# Patient Record
Sex: FEMALE | Race: WHITE | Employment: OTHER | URBAN - METROPOLITAN AREA
[De-identification: names, ages, dates, MRNs, and addresses within clinical notes are randomized per-mention and may not be internally consistent; named-entity substitution may affect disease eponyms.]

---

## 2017-03-08 ENCOUNTER — OFFICE VISIT (OUTPATIENT)
Dept: PRIMARY CARE CLINIC | Age: 67
End: 2017-03-08

## 2017-03-08 VITALS
RESPIRATION RATE: 16 BRPM | BODY MASS INDEX: 35.26 KG/M2 | WEIGHT: 191.6 LBS | SYSTOLIC BLOOD PRESSURE: 124 MMHG | TEMPERATURE: 89.3 F | HEIGHT: 62 IN | HEART RATE: 78 BPM | DIASTOLIC BLOOD PRESSURE: 80 MMHG

## 2017-03-08 DIAGNOSIS — J06.9 ACUTE URI: Primary | ICD-10-CM

## 2017-03-08 DIAGNOSIS — B02.9 HERPES ZOSTER WITHOUT COMPLICATION: ICD-10-CM

## 2017-03-08 DIAGNOSIS — S60.141A CONTUSION OF RIGHT RING FINGER WITH DAMAGE TO NAIL, INITIAL ENCOUNTER: ICD-10-CM

## 2017-03-08 DIAGNOSIS — J30.1 NON-SEASONAL ALLERGIC RHINITIS DUE TO POLLEN: ICD-10-CM

## 2017-03-08 DIAGNOSIS — M81.0 OSTEOPOROSIS: ICD-10-CM

## 2017-03-08 PROCEDURE — 1036F TOBACCO NON-USER: CPT | Performed by: INTERNAL MEDICINE

## 2017-03-08 PROCEDURE — 99214 OFFICE O/P EST MOD 30 MIN: CPT | Performed by: INTERNAL MEDICINE

## 2017-03-08 PROCEDURE — 1090F PRES/ABSN URINE INCON ASSESS: CPT | Performed by: INTERNAL MEDICINE

## 2017-03-08 PROCEDURE — G8484 FLU IMMUNIZE NO ADMIN: HCPCS | Performed by: INTERNAL MEDICINE

## 2017-03-08 PROCEDURE — G8417 CALC BMI ABV UP PARAM F/U: HCPCS | Performed by: INTERNAL MEDICINE

## 2017-03-08 PROCEDURE — G8400 PT W/DXA NO RESULTS DOC: HCPCS | Performed by: INTERNAL MEDICINE

## 2017-03-08 PROCEDURE — 4040F PNEUMOC VAC/ADMIN/RCVD: CPT | Performed by: INTERNAL MEDICINE

## 2017-03-08 PROCEDURE — 3014F SCREEN MAMMO DOC REV: CPT | Performed by: INTERNAL MEDICINE

## 2017-03-08 PROCEDURE — 4005F PHARM THX FOR OP RXD: CPT | Performed by: INTERNAL MEDICINE

## 2017-03-08 PROCEDURE — 1123F ACP DISCUSS/DSCN MKR DOCD: CPT | Performed by: INTERNAL MEDICINE

## 2017-03-08 PROCEDURE — 3017F COLORECTAL CA SCREEN DOC REV: CPT | Performed by: INTERNAL MEDICINE

## 2017-03-08 PROCEDURE — G8427 DOCREV CUR MEDS BY ELIG CLIN: HCPCS | Performed by: INTERNAL MEDICINE

## 2017-03-08 RX ORDER — CEFUROXIME AXETIL 500 MG/1
500 TABLET ORAL 2 TIMES DAILY
Qty: 20 TABLET | Refills: 0 | Status: SHIPPED | OUTPATIENT
Start: 2017-03-08 | End: 2017-03-18

## 2017-03-13 RX ORDER — VALACYCLOVIR HYDROCHLORIDE 1 G/1
1000 TABLET, FILM COATED ORAL 3 TIMES DAILY
Qty: 21 TABLET | Refills: 0 | Status: SHIPPED | OUTPATIENT
Start: 2017-03-13 | End: 2018-04-17 | Stop reason: ALTCHOICE

## 2017-03-19 ASSESSMENT — ENCOUNTER SYMPTOMS
CHOKING: 0
SINUS PAIN: 1
BLOOD IN STOOL: 0
ABDOMINAL DISTENTION: 0
RHINORRHEA: 1
APNEA: 0
FACIAL SWELLING: 0
PHOTOPHOBIA: 0

## 2017-04-17 ENCOUNTER — HOSPITAL ENCOUNTER (OUTPATIENT)
Dept: WOMENS IMAGING | Age: 67
Discharge: HOME | End: 2017-04-17
Payer: MEDICARE

## 2017-04-17 DIAGNOSIS — M81.0 OSTEOPOROSIS: ICD-10-CM

## 2017-04-17 PROCEDURE — 77080 DXA BONE DENSITY AXIAL: CPT

## 2017-04-18 ENCOUNTER — OFFICE VISIT (OUTPATIENT)
Dept: PRIMARY CARE CLINIC | Age: 67
End: 2017-04-18

## 2017-04-18 VITALS
HEIGHT: 62 IN | DIASTOLIC BLOOD PRESSURE: 80 MMHG | WEIGHT: 192.1 LBS | RESPIRATION RATE: 14 BRPM | SYSTOLIC BLOOD PRESSURE: 120 MMHG | BODY MASS INDEX: 35.35 KG/M2 | HEART RATE: 76 BPM | TEMPERATURE: 98.2 F

## 2017-04-18 DIAGNOSIS — M25.561 ACUTE PAIN OF RIGHT KNEE: Primary | ICD-10-CM

## 2017-04-18 DIAGNOSIS — Z23 NEED FOR VACCINATION WITH 13-POLYVALENT PNEUMOCOCCAL CONJUGATE VACCINE: ICD-10-CM

## 2017-04-18 PROCEDURE — 4040F PNEUMOC VAC/ADMIN/RCVD: CPT | Performed by: INTERNAL MEDICINE

## 2017-04-18 PROCEDURE — G8417 CALC BMI ABV UP PARAM F/U: HCPCS | Performed by: INTERNAL MEDICINE

## 2017-04-18 PROCEDURE — 3014F SCREEN MAMMO DOC REV: CPT | Performed by: INTERNAL MEDICINE

## 2017-04-18 PROCEDURE — 1090F PRES/ABSN URINE INCON ASSESS: CPT | Performed by: INTERNAL MEDICINE

## 2017-04-18 PROCEDURE — 3017F COLORECTAL CA SCREEN DOC REV: CPT | Performed by: INTERNAL MEDICINE

## 2017-04-18 PROCEDURE — 1036F TOBACCO NON-USER: CPT | Performed by: INTERNAL MEDICINE

## 2017-04-18 PROCEDURE — 90670 PCV13 VACCINE IM: CPT | Performed by: INTERNAL MEDICINE

## 2017-04-18 PROCEDURE — G8399 PT W/DXA RESULTS DOCUMENT: HCPCS | Performed by: INTERNAL MEDICINE

## 2017-04-18 PROCEDURE — 1123F ACP DISCUSS/DSCN MKR DOCD: CPT | Performed by: INTERNAL MEDICINE

## 2017-04-18 PROCEDURE — G0009 ADMIN PNEUMOCOCCAL VACCINE: HCPCS | Performed by: INTERNAL MEDICINE

## 2017-04-18 PROCEDURE — 99214 OFFICE O/P EST MOD 30 MIN: CPT | Performed by: INTERNAL MEDICINE

## 2017-04-18 PROCEDURE — G8427 DOCREV CUR MEDS BY ELIG CLIN: HCPCS | Performed by: INTERNAL MEDICINE

## 2017-04-18 RX ORDER — HYDROCODONE BITARTRATE AND ACETAMINOPHEN 5; 325 MG/1; MG/1
1 TABLET ORAL EVERY 6 HOURS PRN
Qty: 28 TABLET | Refills: 0 | Status: SHIPPED | OUTPATIENT
Start: 2017-04-18 | End: 2017-11-03 | Stop reason: SDUPTHER

## 2017-04-18 ASSESSMENT — PATIENT HEALTH QUESTIONNAIRE - PHQ9
1. LITTLE INTEREST OR PLEASURE IN DOING THINGS: 0
SUM OF ALL RESPONSES TO PHQ9 QUESTIONS 1 & 2: 0
SUM OF ALL RESPONSES TO PHQ QUESTIONS 1-9: 0
2. FEELING DOWN, DEPRESSED OR HOPELESS: 0

## 2017-04-30 ASSESSMENT — ENCOUNTER SYMPTOMS
BLOOD IN STOOL: 0
CHOKING: 0
ABDOMINAL DISTENTION: 0
PHOTOPHOBIA: 0
SINUS PAIN: 1
APNEA: 0
RHINORRHEA: 1
FACIAL SWELLING: 0

## 2017-08-07 ENCOUNTER — OFFICE VISIT (OUTPATIENT)
Dept: PRIMARY CARE CLINIC | Age: 67
End: 2017-08-07

## 2017-08-07 VITALS
HEIGHT: 62 IN | OXYGEN SATURATION: 96 % | WEIGHT: 192.3 LBS | BODY MASS INDEX: 35.39 KG/M2 | TEMPERATURE: 97.7 F | HEART RATE: 70 BPM | RESPIRATION RATE: 16 BRPM | DIASTOLIC BLOOD PRESSURE: 70 MMHG | SYSTOLIC BLOOD PRESSURE: 128 MMHG

## 2017-08-07 DIAGNOSIS — L03.115 CELLULITIS OF FOOT, RIGHT: Primary | ICD-10-CM

## 2017-08-07 PROCEDURE — 4040F PNEUMOC VAC/ADMIN/RCVD: CPT | Performed by: INTERNAL MEDICINE

## 2017-08-07 PROCEDURE — G8427 DOCREV CUR MEDS BY ELIG CLIN: HCPCS | Performed by: INTERNAL MEDICINE

## 2017-08-07 PROCEDURE — 1123F ACP DISCUSS/DSCN MKR DOCD: CPT | Performed by: INTERNAL MEDICINE

## 2017-08-07 PROCEDURE — 3017F COLORECTAL CA SCREEN DOC REV: CPT | Performed by: INTERNAL MEDICINE

## 2017-08-07 PROCEDURE — 99214 OFFICE O/P EST MOD 30 MIN: CPT | Performed by: INTERNAL MEDICINE

## 2017-08-07 PROCEDURE — G8399 PT W/DXA RESULTS DOCUMENT: HCPCS | Performed by: INTERNAL MEDICINE

## 2017-08-07 PROCEDURE — 96372 THER/PROPH/DIAG INJ SC/IM: CPT | Performed by: INTERNAL MEDICINE

## 2017-08-07 PROCEDURE — G8417 CALC BMI ABV UP PARAM F/U: HCPCS | Performed by: INTERNAL MEDICINE

## 2017-08-07 PROCEDURE — 3014F SCREEN MAMMO DOC REV: CPT | Performed by: INTERNAL MEDICINE

## 2017-08-07 PROCEDURE — 1036F TOBACCO NON-USER: CPT | Performed by: INTERNAL MEDICINE

## 2017-08-07 PROCEDURE — 1090F PRES/ABSN URINE INCON ASSESS: CPT | Performed by: INTERNAL MEDICINE

## 2017-08-07 RX ORDER — LEVOFLOXACIN 500 MG/1
500 TABLET, FILM COATED ORAL DAILY
Qty: 10 TABLET | Refills: 0 | Status: SHIPPED | OUTPATIENT
Start: 2017-08-07 | End: 2017-08-17

## 2017-08-07 RX ORDER — METHYLPREDNISOLONE ACETATE 80 MG/ML
80 INJECTION, SUSPENSION INTRA-ARTICULAR; INTRALESIONAL; INTRAMUSCULAR; SOFT TISSUE ONCE
Status: COMPLETED | OUTPATIENT
Start: 2017-08-07 | End: 2017-08-07

## 2017-08-07 RX ORDER — VALACYCLOVIR HYDROCHLORIDE 1 G/1
2000 TABLET, FILM COATED ORAL 2 TIMES DAILY
Qty: 21 TABLET | Refills: 0 | Status: ON HOLD | OUTPATIENT
Start: 2017-08-07 | End: 2018-05-09

## 2017-08-07 RX ADMIN — METHYLPREDNISOLONE ACETATE 80 MG: 80 INJECTION, SUSPENSION INTRA-ARTICULAR; INTRALESIONAL; INTRAMUSCULAR; SOFT TISSUE at 18:54

## 2017-08-10 ASSESSMENT — ENCOUNTER SYMPTOMS
FACIAL SWELLING: 0
BLOOD IN STOOL: 0
ABDOMINAL DISTENTION: 0
CHOKING: 0
PHOTOPHOBIA: 0
APNEA: 0

## 2017-08-21 DIAGNOSIS — E78.1 HYPERTRIGLYCERIDEMIA: ICD-10-CM

## 2017-08-22 RX ORDER — GEMFIBROZIL 600 MG/1
TABLET, FILM COATED ORAL
Qty: 60 TABLET | Refills: 5 | Status: ON HOLD | OUTPATIENT
Start: 2017-08-22 | End: 2018-05-09

## 2017-11-03 ENCOUNTER — OFFICE VISIT (OUTPATIENT)
Dept: PRIMARY CARE CLINIC | Age: 67
End: 2017-11-03

## 2017-11-03 VITALS
DIASTOLIC BLOOD PRESSURE: 80 MMHG | HEIGHT: 62 IN | HEART RATE: 77 BPM | TEMPERATURE: 98 F | BODY MASS INDEX: 36.34 KG/M2 | RESPIRATION RATE: 18 BRPM | WEIGHT: 197.5 LBS | OXYGEN SATURATION: 96 % | SYSTOLIC BLOOD PRESSURE: 132 MMHG

## 2017-11-03 DIAGNOSIS — E78.00 PURE HYPERCHOLESTEROLEMIA: ICD-10-CM

## 2017-11-03 DIAGNOSIS — R60.0 EDEMA, LEG: ICD-10-CM

## 2017-11-03 DIAGNOSIS — M79.605 LOW BACK PAIN RADIATING TO BOTH LEGS: ICD-10-CM

## 2017-11-03 DIAGNOSIS — R73.9 HYPERGLYCEMIA: ICD-10-CM

## 2017-11-03 DIAGNOSIS — M25.561 ACUTE PAIN OF RIGHT KNEE: Primary | ICD-10-CM

## 2017-11-03 DIAGNOSIS — E03.8 OTHER SPECIFIED HYPOTHYROIDISM: ICD-10-CM

## 2017-11-03 DIAGNOSIS — M25.561 ACUTE PAIN OF RIGHT KNEE: ICD-10-CM

## 2017-11-03 DIAGNOSIS — M54.50 LOW BACK PAIN RADIATING TO BOTH LEGS: ICD-10-CM

## 2017-11-03 DIAGNOSIS — G47.09 OTHER INSOMNIA: ICD-10-CM

## 2017-11-03 DIAGNOSIS — M79.604 LOW BACK PAIN RADIATING TO BOTH LEGS: ICD-10-CM

## 2017-11-03 DIAGNOSIS — Z23 NEED FOR INFLUENZA VACCINATION: ICD-10-CM

## 2017-11-03 LAB
ALBUMIN SERPL-MCNC: 4.6 G/DL (ref 3.9–4.9)
ALP BLD-CCNC: 94 U/L (ref 40–130)
ALT SERPL-CCNC: 24 U/L (ref 0–33)
ANION GAP SERPL CALCULATED.3IONS-SCNC: 14 MEQ/L (ref 7–13)
AST SERPL-CCNC: 19 U/L (ref 0–35)
BASOPHILS ABSOLUTE: 0 K/UL (ref 0–0.2)
BASOPHILS RELATIVE PERCENT: 0.4 %
BILIRUB SERPL-MCNC: 0.2 MG/DL (ref 0–1.2)
BUN BLDV-MCNC: 21 MG/DL (ref 8–23)
CALCIUM SERPL-MCNC: 9.3 MG/DL (ref 8.6–10.2)
CHLORIDE BLD-SCNC: 102 MEQ/L (ref 98–107)
CHOLESTEROL, TOTAL: 202 MG/DL (ref 0–199)
CO2: 29 MEQ/L (ref 22–29)
CREAT SERPL-MCNC: 0.58 MG/DL (ref 0.5–0.9)
EOSINOPHILS ABSOLUTE: 0.1 K/UL (ref 0–0.7)
EOSINOPHILS RELATIVE PERCENT: 1.6 %
GFR AFRICAN AMERICAN: >60
GFR NON-AFRICAN AMERICAN: >60
GLOBULIN: 2.6 G/DL (ref 2.3–3.5)
GLUCOSE BLD-MCNC: 126 MG/DL (ref 74–109)
HBA1C MFR BLD: 6.1 % (ref 4.8–5.9)
HCT VFR BLD CALC: 40.5 % (ref 37–47)
HDLC SERPL-MCNC: 45 MG/DL (ref 40–59)
HEMOGLOBIN: 13.2 G/DL (ref 12–16)
LDL CHOLESTEROL CALCULATED: 131 MG/DL (ref 0–129)
LYMPHOCYTES ABSOLUTE: 2.7 K/UL (ref 1–4.8)
LYMPHOCYTES RELATIVE PERCENT: 38.1 %
MCH RBC QN AUTO: 27.6 PG (ref 27–31.3)
MCHC RBC AUTO-ENTMCNC: 32.6 % (ref 33–37)
MCV RBC AUTO: 84.9 FL (ref 82–100)
MONOCYTES ABSOLUTE: 0.5 K/UL (ref 0.2–0.8)
MONOCYTES RELATIVE PERCENT: 7.6 %
NEUTROPHILS ABSOLUTE: 3.7 K/UL (ref 1.4–6.5)
NEUTROPHILS RELATIVE PERCENT: 52.3 %
PDW BLD-RTO: 13.8 % (ref 11.5–14.5)
PLATELET # BLD: 392 K/UL (ref 130–400)
POTASSIUM SERPL-SCNC: 4.8 MEQ/L (ref 3.5–5.1)
RBC # BLD: 4.78 M/UL (ref 4.2–5.4)
SODIUM BLD-SCNC: 145 MEQ/L (ref 132–144)
TOTAL PROTEIN: 7.2 G/DL (ref 6.4–8.1)
TRIGL SERPL-MCNC: 129 MG/DL (ref 0–200)
TSH SERPL DL<=0.05 MIU/L-ACNC: 2.1 UIU/ML (ref 0.27–4.2)
WBC # BLD: 7.1 K/UL (ref 4.8–10.8)

## 2017-11-03 PROCEDURE — 1090F PRES/ABSN URINE INCON ASSESS: CPT | Performed by: INTERNAL MEDICINE

## 2017-11-03 PROCEDURE — 3017F COLORECTAL CA SCREEN DOC REV: CPT | Performed by: INTERNAL MEDICINE

## 2017-11-03 PROCEDURE — G0008 ADMIN INFLUENZA VIRUS VAC: HCPCS | Performed by: INTERNAL MEDICINE

## 2017-11-03 PROCEDURE — 1036F TOBACCO NON-USER: CPT | Performed by: INTERNAL MEDICINE

## 2017-11-03 PROCEDURE — G8399 PT W/DXA RESULTS DOCUMENT: HCPCS | Performed by: INTERNAL MEDICINE

## 2017-11-03 PROCEDURE — 99214 OFFICE O/P EST MOD 30 MIN: CPT | Performed by: INTERNAL MEDICINE

## 2017-11-03 PROCEDURE — G8417 CALC BMI ABV UP PARAM F/U: HCPCS | Performed by: INTERNAL MEDICINE

## 2017-11-03 PROCEDURE — G8484 FLU IMMUNIZE NO ADMIN: HCPCS | Performed by: INTERNAL MEDICINE

## 2017-11-03 PROCEDURE — G8427 DOCREV CUR MEDS BY ELIG CLIN: HCPCS | Performed by: INTERNAL MEDICINE

## 2017-11-03 PROCEDURE — 90662 IIV NO PRSV INCREASED AG IM: CPT | Performed by: INTERNAL MEDICINE

## 2017-11-03 PROCEDURE — 1123F ACP DISCUSS/DSCN MKR DOCD: CPT | Performed by: INTERNAL MEDICINE

## 2017-11-03 PROCEDURE — 4040F PNEUMOC VAC/ADMIN/RCVD: CPT | Performed by: INTERNAL MEDICINE

## 2017-11-03 PROCEDURE — 3014F SCREEN MAMMO DOC REV: CPT | Performed by: INTERNAL MEDICINE

## 2017-11-03 RX ORDER — ZOLPIDEM TARTRATE 10 MG/1
10 TABLET ORAL NIGHTLY PRN
Qty: 30 TABLET | Refills: 2 | Status: SHIPPED | OUTPATIENT
Start: 2017-11-03 | End: 2017-12-03

## 2017-11-03 RX ORDER — HYDROCODONE BITARTRATE AND ACETAMINOPHEN 5; 325 MG/1; MG/1
1 TABLET ORAL EVERY 6 HOURS PRN
Qty: 28 TABLET | Refills: 0 | Status: SHIPPED | OUTPATIENT
Start: 2017-11-03 | End: 2018-04-17 | Stop reason: ALTCHOICE

## 2017-11-03 RX ORDER — SIMVASTATIN 10 MG
TABLET ORAL
Qty: 30 TABLET | Refills: 5 | Status: SHIPPED | OUTPATIENT
Start: 2017-11-03 | End: 2018-03-22 | Stop reason: SDUPTHER

## 2017-11-03 RX ORDER — TRIAMTERENE AND HYDROCHLOROTHIAZIDE 37.5; 25 MG/1; MG/1
1 TABLET ORAL DAILY
Qty: 30 TABLET | Refills: 5 | Status: SHIPPED | OUTPATIENT
Start: 2017-11-03 | End: 2018-07-03

## 2017-11-03 NOTE — PROGRESS NOTES
right knee  -     HYDROcodone-acetaminophen (NORCO) 5-325 MG per tablet; Take 1 tablet by mouth every 6 hours as needed for Pain . Earliest Fill Date: 11/3/17  -     CBC With Auto Differential; Future  -     Ambulatory referral to Physical Therapy    Low back pain radiating to both legs  -     Ambulatory referral to Physical Therapy    Pure hypercholesterolemia  -     simvastatin (ZOCOR) 10 MG tablet; TAKE ONE TABLET BY MOUTH ONCE DAILY IN THE EVENING  -     CBC With Auto Differential; Future  -     Lipid Panel    Other insomnia  -     zolpidem (AMBIEN) 10 MG tablet; Take 1 tablet by mouth nightly as needed for Sleep    Edema, leg  -     triamterene-hydrochlorothiazide (MAXZIDE-25) 37.5-25 MG per tablet; Take 1 tablet by mouth daily    Other specified hypothyroidism  -     TSH Without Reflex; Future    Hyperglycemia  -     Comprehensive Metabolic Panel; Future  -     Hemoglobin A1C; Future    Need for influenza vaccination  -     INFLUENZA, HIGH DOSE, 65 YRS +, IM, PF, PREFILL SYR, 0.5ML (FLUZONE HD)        No Follow-up on file. Sophia Dyson MD      Vaccine Information Sheet, \"Influenza - Inactivated\"  given to Coby Jackson, or parent/legal guardian of  Coby Jackson and verbalized understanding. Patient responses:    Have you ever had a reaction to a flu vaccine? No  Are you able to eat eggs without adverse effects? Yes  Do you have any current illness? No  Have you ever had Guillian Chamois Syndrome? No    Flu vaccine given per order. Please see immunization tab.

## 2017-11-04 ASSESSMENT — ENCOUNTER SYMPTOMS
ABDOMINAL DISTENTION: 0
CHOKING: 0
BLOOD IN STOOL: 0
APNEA: 0
FACIAL SWELLING: 0
PHOTOPHOBIA: 0
BACK PAIN: 1

## 2017-11-15 ENCOUNTER — HOSPITAL ENCOUNTER (OUTPATIENT)
Dept: PHYSICAL THERAPY | Age: 67
Setting detail: THERAPIES SERIES
Discharge: HOME | End: 2017-11-15
Payer: MEDICARE

## 2017-11-15 PROCEDURE — 97110 THERAPEUTIC EXERCISES: CPT

## 2017-11-15 PROCEDURE — G8979 MOBILITY GOAL STATUS: HCPCS

## 2017-11-15 PROCEDURE — G8978 MOBILITY CURRENT STATUS: HCPCS

## 2017-11-15 PROCEDURE — 97162 PT EVAL MOD COMPLEX 30 MIN: CPT

## 2017-11-15 ASSESSMENT — PAIN SCALES - GENERAL: PAINLEVEL_OUTOF10: 4

## 2017-11-15 ASSESSMENT — PAIN DESCRIPTION - DESCRIPTORS: DESCRIPTORS: ACHING;SHARP

## 2017-11-15 ASSESSMENT — PAIN DESCRIPTION - PROGRESSION: CLINICAL_PROGRESSION: GRADUALLY WORSENING

## 2017-11-15 ASSESSMENT — PAIN DESCRIPTION - LOCATION: LOCATION: LEG;KNEE

## 2017-11-15 ASSESSMENT — PAIN DESCRIPTION - ORIENTATION: ORIENTATION: RIGHT;LEFT

## 2017-11-15 ASSESSMENT — PAIN DESCRIPTION - FREQUENCY: FREQUENCY: CONTINUOUS

## 2017-11-15 ASSESSMENT — PAIN DESCRIPTION - PAIN TYPE: TYPE: CHRONIC PAIN

## 2017-11-15 NOTE — PROGRESS NOTES
[address]  PHYSICAL THERAPY EVALUATION    Date: 11/15/2017  Patient Name: Felix Beard       MRN: [id number]   Account: [de-identified]   : 1950  (77 y.o.)   Gender: female   Referring Practitioner: Olvin Pino                  Diagnosis: Acute pain of R knee; LBP radiating to B LE's   Treatment Diagnosis: LBP, B LE pain, R knee pain, decreased lumbar arom, decreased hip flexor and HS flexbility, decreased postural awareness, decreased B LE strength R>L, (+) R MARIA FERNANDA, varus stress, and anterior drawer     Past Medical History:  has a past medical history of Chronic back pain and Osteoarthritis. Past Surgical History:   has a past surgical history that includes Appendectomy () and Hysterectomy (). Vital Signs  Patient Currently in Pain: Yes   Pain Screening  Patient Currently in Pain: Yes  Pain Assessment  Pain Assessment: 0-10  Pain Level: 4 (Pain ranges from 2-10/10 )  Pain Type: Chronic pain  Pain Location: Leg;Knee (B thighs to knees R>L )  Pain Orientation: Right;Left  Pain Radiating Towards: Intermittent tinging into B post/medial thighs R>L   Pain Descriptors: Aching; Sharp  Pain Frequency: Continuous  Clinical Progression: Gradually worsening  Effect of Pain on Daily Activities: increased pain/difficulty with floor transfers, quadruped position to play games with grandchildren or clean, sleeping, walking prolonged distances, sitting prolonged periods of time, cooking/cleaning prolonged periods of time requires frequent RB's   Pain Intervention(s): Medication (see eMar); Tub bath     Lives With: Spouse  Type of Home: House  Home Layout: One level  Home Access: Stairs to enter with rails  ADL Assistance: Independent  Homemaking Assistance: Independent  Homemaking Responsibilities: Yes  Ambulation Assistance: Independent  Transfer Assistance: Independent  Active : Yes  Occupation: Retired  Leisure & Hobbies: dancing   IADL Comments: difficulty putting Assessment  Risk Factor Scale  Score   History of Falls [] Yes  [x] No 25  0    Secondary Diagnosis [] Yes  [x] No 15  0    Ambulatory Aid [] Furniture  [] Crutches/cane/walker  [x] None/bedrest/wheelchair/nurse 30  15  0    IV/Heparin Lock [] Yes  [x] No 20  0    Gait/Transferring [] Impaired  [] Weak  [x] Normal/bedrest/immobile 20  10  0    Mental Status [] Forgets limitations  [x] Oriented to own ability 15  0       Total: 0     Based on the Assessment score: check the appropriate box.   [x]  No intervention needed   Low =   Score of 0-24  []  Use standard prevention interventions Moderate =  Score of 24-44   [] Discuss fall prevention strategies   [] Indicate moderate falls risk on eval  []  Use high risk prevention interventions High = Score of 45 and higher   [] Discuss fall prevention strategies   [] Provide supervision during treatment time    Goals  Long term goals  Time Frame for Long term goals : 5 weeks   Long term goal 1: Pt will be indep/compliant with HEP in order to self manage symptoms upon D/C  Long term goal 2: The pt will demo improved B LE strength >/= 4+/5 in order to safely ambulate with decreased pain/limitations   Long term goal 3: The pt will demo improved lumbar arom >/=5-10* ea in order to perform ADL's with decreased pain   Long term goal 4: The pt will demonstrate improved postural awareness requiring <25% VC's with exercises    Treatment Initiated : ther ex and hep     PT Individual Minutes  Time In: 1424  Time Out: 1508  Minutes: 44  Timed Code Treatment Minutes: 10 Minutes  Procedure Minutes: 34 minutes   Electronically signed by Daysi Powell PT on 11/15/17 at 3:20 PM

## 2017-11-15 NOTE — PROGRESS NOTES
[address], [address]     Ph: [phone]  Fax: [phone]    [x] Certification  [] Recertification [x]  Plan of Care  [] Progress Note [] Discharge      To:  Referring Practitioner: Elton Louis       From:  Heather Nair, PT  Patient: Piedad Carrillo     : 1950  Diagnosis: Acute pain of R knee; LBP radiating to B LE's      Date: 11/15/2017  Treatment Diagnosis: LBP, B LE pain, R knee pain, decreased lumbar arom, decreased hip flexor and HS flexbility, decreased postural awareness, decreased B LE strength R>L, (+) R MARIA FERNANDA, varus stress, and anterior drawer    Plan of Care/Certification Expiration Date: 02/15/18  Progress Report Period from:  11/15/2017  to 11/15/2017    Total # of Visits to Date: 1   No Show: 0    Canceled Appointment: 0     OBJECTIVE:   Long Term Goals - Time Frame for Long term goals : 5 weeks   Goals Current/ Discharge status Met   Long term goal 1: Pt will be indep/compliant with HEP in order to self manage symptoms upon D/C ongoing [] yes  [] no   Long term goal 2: The pt will demo improved B LE strength >/= 4+/5 in order to safely ambulate with decreased pain/limitations  Strength RLE  Comment: 4+/5 Hip IR/ER, ankle df, 4/5 knee, 4-/5 hip flex, abd, 3+/5 hip ext   Strength LLE  Comment: 5/5 except hip flex 4/5, hams 4+/5, 4-/5 hip abd, 3+/5 hip ext   [] yes  [] no   Long term goal 3: The pt will demo improved lumbar arom >/=5-10* ea in order to perform ADL's with decreased pain  Spine  Lumbar: flex WFL, ext 15, SB R 20, L 23  (pain at all end ranges ) [] yes  [] no   Long term goal 4: The pt will demonstrate improved postural awareness requiring <25% VC's with exercises Fair  [] yes  [] no     Body structures, Functions, Activity limitations: Decreased functional mobility , Decreased ROM, Decreased strength, Decreased ADL status  Assessment: Pt presents with a chronic history of LB and

## 2017-11-17 ENCOUNTER — TELEPHONE (OUTPATIENT)
Dept: PRIMARY CARE CLINIC | Age: 67
End: 2017-11-17

## 2017-11-17 NOTE — TELEPHONE ENCOUNTER
Pt states she has received a drug interaction warning for Lopid and Zocar from the pharmacist and her insurance co. She stopped taking the Zocor. Please advise.

## 2017-11-21 ENCOUNTER — HOSPITAL ENCOUNTER (OUTPATIENT)
Dept: PHYSICAL THERAPY | Age: 67
Setting detail: THERAPIES SERIES
Discharge: HOME | End: 2017-11-21
Payer: MEDICARE

## 2017-11-21 PROCEDURE — 97113 AQUATIC THERAPY/EXERCISES: CPT

## 2017-11-21 ASSESSMENT — PAIN DESCRIPTION - DESCRIPTORS: DESCRIPTORS: ACHING

## 2017-11-21 ASSESSMENT — PAIN SCALES - GENERAL: PAINLEVEL_OUTOF10: 3

## 2017-11-21 ASSESSMENT — PAIN DESCRIPTION - LOCATION: LOCATION: KNEE;LEG

## 2017-11-21 ASSESSMENT — PAIN DESCRIPTION - PAIN TYPE: TYPE: CHRONIC PAIN

## 2017-11-21 ASSESSMENT — PAIN DESCRIPTION - ORIENTATION: ORIENTATION: RIGHT;LEFT

## 2017-11-21 NOTE — PROGRESS NOTES
1: Pt will be indep/compliant with HEP in order to self manage symptoms upon D/C  Long term goal 2: The pt will demo improved B LE strength >/= 4+/5 in order to safely ambulate with decreased pain/limitations   Long term goal 3: The pt will demo improved lumbar arom >/=5-10* ea in order to perform ADL's with decreased pain   Long term goal 4: The pt will demonstrate improved postural awareness requiring <25% VC's with exercises  Progress toward goals: initiated aquatics to progress towards all of the above goals    POST-PAIN       Pain Rating (0-10 pain scale):   2/10 in b/l shoulders, 0/10 in b/l LE's  Location and pain description same as pre-treatment unless indicated. Action: [] NA   [x] Perform HEP  [] Meds as prescribed  [x] Modalities as prescribed   [] Call Physician     Frequency/Duration:  Plan  Times per week: 2  Plan weeks: 5  Current Treatment Recommendations: Strengthening, ROM, Modalities, Manual Therapy - Soft Tissue Mobilization, Manual Therapy - Joint Manipulation, Home Exercise Program (mary, IDN)  Plan Comment: Transition to land as appropriate      Pt to continue current HEP. See objective section for any therapeutic exercise changes, additions or modifications this date.     Treatment Initiated : aquatics    PT Individual Minutes  Time In: 0926  Time Out: 1004  Minutes: 38  Timed Code Treatment Minutes: 38 Minutes  Procedure Minutes: 0    Signature:  Electronically signed by Sarina Neumann PTA on 11/21/17 at 10:19 AM

## 2017-11-22 ENCOUNTER — HOSPITAL ENCOUNTER (OUTPATIENT)
Dept: PHYSICAL THERAPY | Age: 67
Setting detail: THERAPIES SERIES
Discharge: HOME | End: 2017-11-22
Payer: MEDICARE

## 2017-11-22 PROCEDURE — 97113 AQUATIC THERAPY/EXERCISES: CPT

## 2017-11-22 ASSESSMENT — PAIN DESCRIPTION - ORIENTATION: ORIENTATION: LEFT;RIGHT

## 2017-11-22 ASSESSMENT — PAIN DESCRIPTION - DESCRIPTORS: DESCRIPTORS: ACHING

## 2017-11-22 ASSESSMENT — PAIN SCALES - GENERAL: PAINLEVEL_OUTOF10: 2

## 2017-11-22 NOTE — PROGRESS NOTES
Fair  Patient Education: cont HEP, soreness post-aquatics    Goals:        Long term goals  Time Frame for Long term goals : 5 weeks   Long term goal 1: Pt will be indep/compliant with HEP in order to self manage symptoms upon D/C  Long term goal 2: The pt will demo improved B LE strength >/= 4+/5 in order to safely ambulate with decreased pain/limitations   Long term goal 3: The pt will demo improved lumbar arom >/=5-10* ea in order to perform ADL's with decreased pain   Long term goal 4: The pt will demonstrate improved postural awareness requiring <25% VC's with exercises  Progress toward goals:pt with good response to treatments and pain decreasing. POST-PAIN       Pain Rating (0-10 pain scale):  0 /10   Location and pain description same as pre-treatment unless indicated. Action: [x] NA   [] Perform HEP  [] Meds as prescribed  [] Modalities as prescribed   [] Call Physician     Frequency/Duration:  Plan  Times per week: 2  Plan weeks: 5  Current Treatment Recommendations: Strengthening, ROM, Modalities, Manual Therapy - Soft Tissue Mobilization, Manual Therapy - Joint Manipulation, Home Exercise Program (aquaticcs, IDN)  Plan Comment: Transition to land as appropriate      Pt to continue current HEP. See objective section for any therapeutic exercise changes, additions or modifications this date.     Treatment Initiated : aquatics    PT Individual Minutes  Time In: 7956  Time Out: 6101  Minutes: 38  Timed Code Treatment Minutes: 38 Minutes  Procedure Minutes:0    Signature:  Electronically signed by Nikunj Baca PT on 11/22/17 at 2:56 PM

## 2017-11-28 ENCOUNTER — HOSPITAL ENCOUNTER (OUTPATIENT)
Dept: PHYSICAL THERAPY | Age: 67
Setting detail: THERAPIES SERIES
Discharge: HOME | End: 2017-11-28
Payer: MEDICARE

## 2017-11-28 PROCEDURE — 97113 AQUATIC THERAPY/EXERCISES: CPT

## 2017-11-28 ASSESSMENT — PAIN DESCRIPTION - DESCRIPTORS: DESCRIPTORS: ACHING

## 2017-11-28 ASSESSMENT — PAIN SCALES - GENERAL: PAINLEVEL_OUTOF10: 3

## 2017-11-28 ASSESSMENT — PAIN DESCRIPTION - PAIN TYPE: TYPE: CHRONIC PAIN

## 2017-11-28 ASSESSMENT — PAIN DESCRIPTION - FREQUENCY: FREQUENCY: CONTINUOUS

## 2017-11-28 ASSESSMENT — PAIN DESCRIPTION - ORIENTATION: ORIENTATION: LOWER

## 2017-11-28 ASSESSMENT — PAIN DESCRIPTION - LOCATION: LOCATION: KNEE;BACK

## 2017-11-28 NOTE — PROGRESS NOTES
>/= 4+/5 in order to safely ambulate with decreased pain/limitations   Long term goal 3: The pt will demo improved lumbar arom >/=5-10* ea in order to perform ADL's with decreased pain   Long term goal 4: The pt will demonstrate improved postural awareness requiring <25% VC's with exercises  Progress toward goals: LE strength. POST-PAIN       Pain Rating (0-10 pain scale):   4/10 \"Just muscle soreness from exs. \"   Location and pain description same as pre-treatment unless indicated. Action: [] NA   [x] Perform HEP  [] Meds as prescribed  [] Modalities as prescribed   [] Call Physician     Frequency/Duration:  Plan  Times per week: 2  Plan weeks: 5  Current Treatment Recommendations: Strengthening, ROM, Modalities, Manual Therapy - Soft Tissue Mobilization, Manual Therapy - Joint Manipulation, Home Exercise Program (aquaticcs, IDN)  Plan Comment: Transition to land as appropriate      Pt to continue current HEP. See objective section for any therapeutic exercise changes, additions or modifications this date.      PT Individual Minutes  Time In: 7653  Time Out: 1500  Minutes: 38  Timed Code Treatment Minutes: 38 Minutes  Procedure Minutes: N/A    Signature:  Electronically signed by Varun Hernández PTA on 11/28/17 at 5:32 PM

## 2017-12-01 ENCOUNTER — HOSPITAL ENCOUNTER (OUTPATIENT)
Dept: PHYSICAL THERAPY | Age: 67
Setting detail: THERAPIES SERIES
Discharge: HOME | End: 2017-12-01
Payer: MEDICARE

## 2017-12-01 PROCEDURE — 97113 AQUATIC THERAPY/EXERCISES: CPT

## 2017-12-01 NOTE — PROGRESS NOTES
[id number] [address]  Outpatient Physical Therapy    Treatment Note        Date: 2017  Patient: Grupo Arevalo  : 1950  ACCT #: [de-identified]  Referring Practitioner: Kelley Teran   Diagnosis: Acute pain of R knee; LBP radiating to B LE's     Visit Information:  PT Visit Information  PT Insurance Information: Medicare   Total # of Visits to Date: 5  Plan of Care/Certification Expiration Date: 02/15/18  No Show: 0  Canceled Appointment: 0  Progress Note Counter: 5/10    Subjective: no pain today, feel good     HEP Compliance:  [x] Good [] Fair [] Poor [] Reports not doing due to:    Vital Signs  Patient Currently in Pain: No   Pain Screening  Patient Currently in Pain: No    OBJECTIVE:   Exercises  Exercise 3: see paper PRE for details of aquatic exercises to be scanned into EMR upon D/C       Strength: [x] NT  [] MMT completed:      ROM: [x] NT  [] ROM measurements:      *Indicates exercise, modality, or manual techniques to be initiated when appropriate    Assessment:         Body structures, Functions, Activity limitations: Decreased functional mobility , Decreased ROM, Decreased strength, Decreased ADL status  Assessment: increased reps and added SLs high knee march, good north overall, vc's to challange for balance during ex, using less UE support  Treatment Diagnosis: LBP, B LE pain, R knee pain, decreased lumbar arom, decreased hip flexor and HS flexbility, decreased postural awareness, decreased B LE strength R>L, (+) R MARIA FERNANDA, varus stress, and anterior drawer  Prognosis: Fair       Goals:       Long term goals  Time Frame for Long term goals : 5 weeks   Long term goal 1: Pt will be indep/compliant with HEP in order to self manage symptoms upon D/C  Long term goal 2: The pt will demo improved B LE strength >/= 4+/5 in order to safely ambulate with decreased pain/limitations   Long term goal 3: The pt will demo improved lumbar arom >/=5-10* ea in order to perform ADL's with decreased pain Long term goal 4: The pt will demonstrate improved postural awareness requiring <25% VC's with exercises  Progress toward goals:ongoing    POST-PAIN       Pain Rating (0-10 pain scale):   0/10   Location and pain description same as pre-treatment unless indicated. Action: [] NA   [] Perform HEP  [] Meds as prescribed  [] Modalities as prescribed   [] Call Physician     Frequency/Duration:  Plan  Times per week: 2  Plan weeks: 5  Current Treatment Recommendations: Strengthening, ROM, Modalities, Manual Therapy - Soft Tissue Mobilization, Manual Therapy - Joint Manipulation, Home Exercise Program (aquaticcs, IDN)  Plan Comment: Transition to land as appropriate      Pt to continue current HEP. See objective section for any therapeutic exercise changes, additions or modifications this date.          PT Individual Minutes  Time In: 3707  Time Out: 3105  Minutes: 38  Timed Code Treatment Minutes: 38 Minutes  Procedure Minutes:0    Signature:  Electronically signed by Hector Cruz PTA on 12/1/17 at 3:03 PM

## 2017-12-05 ENCOUNTER — HOSPITAL ENCOUNTER (OUTPATIENT)
Dept: PHYSICAL THERAPY | Age: 67
Setting detail: THERAPIES SERIES
Discharge: HOME | End: 2017-12-05
Payer: MEDICARE

## 2017-12-05 PROCEDURE — 97113 AQUATIC THERAPY/EXERCISES: CPT

## 2017-12-05 ASSESSMENT — PAIN DESCRIPTION - FREQUENCY: FREQUENCY: INTERMITTENT

## 2017-12-05 ASSESSMENT — PAIN DESCRIPTION - DESCRIPTORS: DESCRIPTORS: ACHING

## 2017-12-05 ASSESSMENT — PAIN DESCRIPTION - PAIN TYPE: TYPE: CHRONIC PAIN

## 2017-12-05 ASSESSMENT — PAIN SCALES - GENERAL: PAINLEVEL_OUTOF10: 1

## 2017-12-05 ASSESSMENT — PAIN DESCRIPTION - LOCATION: LOCATION: KNEE

## 2017-12-05 ASSESSMENT — PAIN DESCRIPTION - ORIENTATION: ORIENTATION: RIGHT

## 2017-12-05 NOTE — PROGRESS NOTES
symptoms upon D/C  Long term goal 2: The pt will demo improved B LE strength >/= 4+/5 in order to safely ambulate with decreased pain/limitations   Long term goal 3: The pt will demo improved lumbar arom >/=5-10* ea in order to perform ADL's with decreased pain   Long term goal 4: The pt will demonstrate improved postural awareness requiring <25% VC's with exercises  Progress toward goals: Transition pt to land PT. POST-PAIN       Pain Rating (0-10 pain scale):   3/10   Location and pain description same as pre-treatment unless indicated. Action: [] NA   [x] Perform HEP  [] Meds as prescribed  [] Modalities as prescribed   [] Call Physician     Frequency/Duration:  Plan  Times per week: 2  Plan weeks: 5  Specific instructions for Next Treatment: Please discuss w/ pt on transitioning to land based PT following next pool session. Current Treatment Recommendations: Strengthening, ROM, Modalities, Manual Therapy - Soft Tissue Mobilization, Manual Therapy - Joint Manipulation, Home Exercise Program (aquaticcs, IDN)  Plan Comment: Transition to land as appropriate      Pt to continue current HEP. See objective section for any therapeutic exercise changes, additions or modifications this date.     PT Individual Minutes  Time In: 1806  Time Out: 0633  Minutes: 40  Timed Code Treatment Minutes: 40 Minutes  Procedure Minutes: N/A    Signature:  Electronically signed by Patricio Victor PTA on 12/5/17 at 2:56 PM

## 2017-12-08 ENCOUNTER — HOSPITAL ENCOUNTER (OUTPATIENT)
Dept: PHYSICAL THERAPY | Age: 67
Setting detail: THERAPIES SERIES
Discharge: HOME | End: 2017-12-08
Payer: MEDICARE

## 2017-12-08 PROCEDURE — 97113 AQUATIC THERAPY/EXERCISES: CPT

## 2017-12-08 ASSESSMENT — PAIN SCALES - GENERAL: PAINLEVEL_OUTOF10: 0

## 2017-12-08 NOTE — PROGRESS NOTES
[address] [address]  Outpatient Physical Therapy    Treatment Note        Date: 2017  Patient: Lashawn Law  : 1950  ACCT #: [de-identified]  Referring Practitioner: Aniyah Boss   Diagnosis: Acute pain of R knee; LBP radiating to B LE's     Visit Information:  PT Visit Information  PT Insurance Information: Medicare   Total # of Visits to Date: 7  Plan of Care/Certification Expiration Date: 02/15/18  No Show: 0  Canceled Appointment: 0  Progress Note Counter: 7/10    Subjective: Pt reports being a little sore from exercising at [employer] before coming to therapy. HEP Compliance:  [x] Good [] Fair [] Poor [] Reports not doing due to:    Vital Signs  Patient Currently in Pain: No   Pain Screening  Patient Currently in Pain: No  Pain Assessment  Pain Assessment: 0-10  Pain Level: 0    OBJECTIVE:   Exercises  Exercise 3: see paper PRE for details of aquatic exercises to be scanned into EMR upon D/C        Strength: [x] NT  [] MMT completed:     ROM: [x] NT  [] ROM measurements:      *Indicates exercise, modality, or manual techniques to be initiated when appropriate    Assessment: Body structures, Functions, Activity limitations: Decreased functional mobility , Decreased ROM, Decreased strength, Decreased ADL status  Assessment: Pt tolerating pool ex's and discussed going to land and she agreed. Called pt later in the day to let her know that her appts are staying the same but will be in the outpt therapy area.    Treatment Diagnosis: LBP, B LE pain, R knee pain, decreased lumbar arom, decreased hip flexor and HS flexbility, decreased postural awareness, decreased B LE strength R>L, (+) R MARIA FERNANDA, varus stress, and anterior drawer  Prognosis: Fair  Patient Education: cont HEP, soreness post-aquatics    Goals:       Long term goals  Time Frame for Long term goals : 5 weeks   Long term goal 1: Pt will be indep/compliant with HEP in order to self manage symptoms upon D/C  Long term

## 2017-12-12 ENCOUNTER — HOSPITAL ENCOUNTER (OUTPATIENT)
Dept: PHYSICAL THERAPY | Age: 67
Setting detail: THERAPIES SERIES
Discharge: HOME | End: 2017-12-12
Payer: MEDICARE

## 2017-12-12 PROCEDURE — 97110 THERAPEUTIC EXERCISES: CPT

## 2017-12-12 ASSESSMENT — PAIN DESCRIPTION - LOCATION: LOCATION: KNEE;GROIN

## 2017-12-12 ASSESSMENT — PAIN SCALES - GENERAL: PAINLEVEL_OUTOF10: 5

## 2017-12-12 ASSESSMENT — PAIN DESCRIPTION - ORIENTATION: ORIENTATION: RIGHT

## 2017-12-12 ASSESSMENT — PAIN DESCRIPTION - DESCRIPTORS: DESCRIPTORS: SORE

## 2017-12-15 ENCOUNTER — HOSPITAL ENCOUNTER (OUTPATIENT)
Dept: PHYSICAL THERAPY | Age: 67
Setting detail: THERAPIES SERIES
Discharge: HOME | End: 2017-12-15
Payer: MEDICARE

## 2017-12-15 PROCEDURE — 97110 THERAPEUTIC EXERCISES: CPT

## 2017-12-15 NOTE — PROGRESS NOTES
[address] [address]  Outpatient Physical Therapy    Treatment Note        Date: 12/15/2017  Patient: Toya Donohue  : 1950  ACCT #: [de-identified]  Referring Practitioner: Nicholas Lo   Diagnosis: Acute pain of R knee; LBP radiating to B LE's     Visit Information:  PT Visit Information  PT Insurance Information: Medicare   Total # of Visits to Date: 5  Plan of Care/Certification Expiration Date: 02/15/18  No Show: 0  Canceled Appointment: 0  Progress Note Counter: 9/10    Subjective: Pt states \"I'm not having pain today. I put a pillow between my knees last night and I slept the best I ever have I got about 6 or 7 hrs. \" Pt reports inc pain standing on tile and concrete for extended period of time. States bought rubber floor mat for kitchen to improve ability to tolerate. HEP Compliance:  [x] Good [] Fair [] Poor [] Reports not doing due to:    Vital Signs  Patient Currently in Pain: No   Pain Screening  Patient Currently in Pain: No    OBJECTIVE:   Exercises  Exercise 1: SKTC, DKTC 20\"x3   Exercise 2: HS stretch 30\"x3 Stairs   Exercise 4: Scifit L2 x 5 minutes  Exercise 5: LTR 5\" x 10  Exercise 6: TA Lena 5 seconds x 15  Exercise 7: DLS 3 ways x 15  Exercise 8: SLR x 15 Tree. Exercise 9: H/L ADD & ABD ball/RTB x20    *Indicates exercise, modality, or manual techniques to be initiated when appropriate    Assessment: Body structures, Functions, Activity limitations: Decreased functional mobility , Decreased ROM, Decreased strength, Decreased ADL status  Assessment: VC for inc eccentric control with exercises to improve technique. Mild quad lag noted with SLR. Pt nearing Ind with exercises for DC NV.   Treatment Diagnosis: LBP, B LE pain, R knee pain, decreased lumbar arom, decreased hip flexor and HS flexbility, decreased postural awareness, decreased B LE strength R>L, (+) R MARIA FERNANDA, varus stress, and anterior drawer  Prognosis: Fair     Goals:  Long term goals  Time Frame for Long term goals : 5

## 2017-12-19 ENCOUNTER — HOSPITAL ENCOUNTER (OUTPATIENT)
Dept: PHYSICAL THERAPY | Age: 67
Setting detail: THERAPIES SERIES
Discharge: HOME | End: 2017-12-19
Payer: MEDICARE

## 2017-12-19 NOTE — PROGRESS NOTES
[address]       Physical Therapy  Cancellation/No-show Note  Patient Name:  Reyes Lee  :  1950   Date:  2017  Referring Practitioner: Dacia Mon   Diagnosis: Acute pain of R knee; LBP radiating to B LE's     Visit Information:  PT Visit Information  PT Insurance Information: Medicare   Total # of Visits to Date: 5  Plan of Care/Certification Expiration Date: 02/15/18  No Show: 0  Canceled Appointment: 1  Progress Note Counter: 9/10 CX. granddaughter sick, has to babysit. For today's appointment patient:  [x]  Cancelled  []  Rescheduled appointment  []  No-show   []  Called pt to remind of next appointment     Reason given by patient:  []  Patient ill  []  Conflicting appointment  []  No transportation    []  Conflict with work  []  No reason given  [x]  Other:   See above.      Comments:       Signature: Electronically signed by Lisa Farmer PTA on 17 at 1:55 PM

## 2017-12-22 ENCOUNTER — HOSPITAL ENCOUNTER (OUTPATIENT)
Dept: PHYSICAL THERAPY | Age: 67
Setting detail: THERAPIES SERIES
Discharge: HOME | End: 2017-12-22
Payer: MEDICARE

## 2017-12-22 PROCEDURE — 97110 THERAPEUTIC EXERCISES: CPT

## 2017-12-22 PROCEDURE — G8978 MOBILITY CURRENT STATUS: HCPCS

## 2017-12-22 PROCEDURE — G8980 MOBILITY D/C STATUS: HCPCS

## 2017-12-22 PROCEDURE — G8979 MOBILITY GOAL STATUS: HCPCS

## 2017-12-22 NOTE — PROGRESS NOTES
exception of lumbar arom and L hip extension strength. Pt has also made significant improvements in functional abilities and is currently indep with HEP in order to self manage symptoms at this time. G-Codes  PT G-Codes  Functional Assessment Tool Used: DAE and clinical judgement   Score: 19/50=38% imp   Functional Limitation: Mobility: Walking and moving around  Mobility: Walking and Moving Around Goal Status (): At least 20 percent but less than 40 percent impaired, limited or restricted  Mobility: Walking and Moving Around Discharge Status (): At least 20 percent but less than 40 percent impaired, limited or restricted    PLAN: Discharge             ? Patient Status:[] Continue/ Initiate plan of Care    [x] Discharge PT. Recommend pt continue with HEP. [] Additional visits requested, Please re-certify for additional visits:        Signature: Electronically signed by Dylan Richardson PT on 12/26/2017 at 12:09 PM  Objective info by: Electronically signed by Arti Stark PTA on 12/22/17 at 1:56 PM    If you have any questions or concerns, please don't hesitate to call. Thank you for your referral.    I have reviewed this plan of care and certify a need for medically necessary rehabilitation services.     Physician Signature:__________________________________________________________  Date:  Please sign and return

## 2018-03-22 DIAGNOSIS — E78.00 PURE HYPERCHOLESTEROLEMIA: ICD-10-CM

## 2018-03-22 RX ORDER — SIMVASTATIN 10 MG
TABLET ORAL
Qty: 90 TABLET | Refills: 1 | Status: SHIPPED | OUTPATIENT
Start: 2018-03-22 | End: 2018-10-25 | Stop reason: SDUPTHER

## 2018-04-17 ENCOUNTER — OFFICE VISIT (OUTPATIENT)
Dept: PRIMARY CARE CLINIC | Age: 68
End: 2018-04-17
Payer: MEDICARE

## 2018-04-17 VITALS
HEIGHT: 62 IN | WEIGHT: 190 LBS | HEART RATE: 62 BPM | SYSTOLIC BLOOD PRESSURE: 124 MMHG | RESPIRATION RATE: 14 BRPM | DIASTOLIC BLOOD PRESSURE: 80 MMHG | TEMPERATURE: 98.6 F | OXYGEN SATURATION: 94 % | BODY MASS INDEX: 34.96 KG/M2

## 2018-04-17 DIAGNOSIS — Z12.11 SCREEN FOR COLON CANCER: ICD-10-CM

## 2018-04-17 DIAGNOSIS — Z00.00 ROUTINE GENERAL MEDICAL EXAMINATION AT A HEALTH CARE FACILITY: Primary | ICD-10-CM

## 2018-04-17 DIAGNOSIS — R19.5 POSITIVE FIT (FECAL IMMUNOCHEMICAL TEST): Primary | ICD-10-CM

## 2018-04-17 DIAGNOSIS — R07.89 CHEST WALL PAIN: Primary | ICD-10-CM

## 2018-04-17 PROCEDURE — G0328 FECAL BLOOD SCRN IMMUNOASSAY: HCPCS | Performed by: INTERNAL MEDICINE

## 2018-04-17 PROCEDURE — G0438 PPPS, INITIAL VISIT: HCPCS | Performed by: INTERNAL MEDICINE

## 2018-04-17 PROCEDURE — 4040F PNEUMOC VAC/ADMIN/RCVD: CPT | Performed by: INTERNAL MEDICINE

## 2018-04-17 RX ORDER — IBUPROFEN 800 MG/1
800 TABLET ORAL 2 TIMES DAILY PRN
Qty: 60 TABLET | Refills: 5 | Status: SHIPPED | OUTPATIENT
Start: 2018-04-17 | End: 2018-10-25 | Stop reason: SDUPTHER

## 2018-04-17 ASSESSMENT — LIFESTYLE VARIABLES
HOW OFTEN DURING THE LAST YEAR HAVE YOU FAILED TO DO WHAT WAS NORMALLY EXPECTED FROM YOU BECAUSE OF DRINKING: 0
HAS A RELATIVE, FRIEND, DOCTOR, OR ANOTHER HEALTH PROFESSIONAL EXPRESSED CONCERN ABOUT YOUR DRINKING OR SUGGESTED YOU CUT DOWN: 0
HOW OFTEN DO YOU HAVE A DRINK CONTAINING ALCOHOL: 1
HOW OFTEN DO YOU HAVE SIX OR MORE DRINKS ON ONE OCCASION: 0
HOW OFTEN DURING THE LAST YEAR HAVE YOU NEEDED AN ALCOHOLIC DRINK FIRST THING IN THE MORNING TO GET YOURSELF GOING AFTER A NIGHT OF HEAVY DRINKING: 0
HOW OFTEN DURING THE LAST YEAR HAVE YOU FOUND THAT YOU WERE NOT ABLE TO STOP DRINKING ONCE YOU HAD STARTED: 0
HOW MANY STANDARD DRINKS CONTAINING ALCOHOL DO YOU HAVE ON A TYPICAL DAY: 0
AUDIT-C TOTAL SCORE: 1
HAVE YOU OR SOMEONE ELSE BEEN INJURED AS A RESULT OF YOUR DRINKING: 0

## 2018-04-17 ASSESSMENT — ANXIETY QUESTIONNAIRES: GAD7 TOTAL SCORE: 0

## 2018-04-17 ASSESSMENT — PATIENT HEALTH QUESTIONNAIRE - PHQ9: SUM OF ALL RESPONSES TO PHQ QUESTIONS 1-9: 0

## 2018-04-18 ENCOUNTER — TELEPHONE (OUTPATIENT)
Dept: PRIMARY CARE CLINIC | Age: 68
End: 2018-04-18

## 2018-04-18 ENCOUNTER — NURSE ONLY (OUTPATIENT)
Dept: PRIMARY CARE CLINIC | Age: 68
End: 2018-04-18
Payer: MEDICARE

## 2018-04-18 DIAGNOSIS — Z23 NEED FOR PNEUMOCOCCAL VACCINATION: Primary | ICD-10-CM

## 2018-04-18 LAB
CONTROL: ABNORMAL
HEMOCCULT STL QL: ABNORMAL

## 2018-04-18 PROCEDURE — G0009 ADMIN PNEUMOCOCCAL VACCINE: HCPCS | Performed by: INTERNAL MEDICINE

## 2018-04-18 PROCEDURE — 90732 PPSV23 VACC 2 YRS+ SUBQ/IM: CPT | Performed by: INTERNAL MEDICINE

## 2018-05-09 ENCOUNTER — ANESTHESIA (OUTPATIENT)
Dept: ENDOSCOPY | Age: 68
End: 2018-05-09
Payer: MEDICARE

## 2018-05-09 ENCOUNTER — ANESTHESIA EVENT (OUTPATIENT)
Dept: ENDOSCOPY | Age: 68
End: 2018-05-09
Payer: MEDICARE

## 2018-05-09 ENCOUNTER — HOSPITAL ENCOUNTER (OUTPATIENT)
Age: 68
Setting detail: OUTPATIENT SURGERY
Discharge: HOME | End: 2018-05-09
Attending: SPECIALIST | Admitting: SPECIALIST
Payer: MEDICARE

## 2018-05-09 VITALS
RESPIRATION RATE: 16 BRPM | OXYGEN SATURATION: 97 % | DIASTOLIC BLOOD PRESSURE: 69 MMHG | TEMPERATURE: 97.8 F | WEIGHT: 190 LBS | BODY MASS INDEX: 34.96 KG/M2 | HEIGHT: 62 IN | SYSTOLIC BLOOD PRESSURE: 117 MMHG | HEART RATE: 78 BPM

## 2018-05-09 VITALS
RESPIRATION RATE: 14 BRPM | OXYGEN SATURATION: 96 % | DIASTOLIC BLOOD PRESSURE: 45 MMHG | SYSTOLIC BLOOD PRESSURE: 96 MMHG

## 2018-05-09 PROCEDURE — 3700000000 HC ANESTHESIA ATTENDED CARE: Performed by: SPECIALIST

## 2018-05-09 PROCEDURE — 7100000010 HC PHASE II RECOVERY - FIRST 15 MIN: Performed by: SPECIALIST

## 2018-05-09 PROCEDURE — 6360000002 HC RX W HCPCS: Performed by: NURSE ANESTHETIST, CERTIFIED REGISTERED

## 2018-05-09 PROCEDURE — 7100000011 HC PHASE II RECOVERY - ADDTL 15 MIN: Performed by: SPECIALIST

## 2018-05-09 PROCEDURE — 3700000001 HC ADD 15 MINUTES (ANESTHESIA): Performed by: SPECIALIST

## 2018-05-09 PROCEDURE — 3609027000 HC COLONOSCOPY: Performed by: SPECIALIST

## 2018-05-09 RX ORDER — CHLORAL HYDRATE 500 MG
3000 CAPSULE ORAL 3 TIMES DAILY
COMMUNITY
End: 2020-09-17

## 2018-05-09 RX ORDER — SODIUM CHLORIDE 9 MG/ML
INJECTION, SOLUTION INTRAVENOUS CONTINUOUS
Status: DISCONTINUED | OUTPATIENT
Start: 2018-05-09 | End: 2018-05-09 | Stop reason: HOSPADM

## 2018-05-09 RX ORDER — PROPOFOL 10 MG/ML
INJECTION, EMULSION INTRAVENOUS PRN
Status: DISCONTINUED | OUTPATIENT
Start: 2018-05-09 | End: 2018-05-09 | Stop reason: SDUPTHER

## 2018-05-09 RX ORDER — LIDOCAINE HYDROCHLORIDE 10 MG/ML
1 INJECTION, SOLUTION EPIDURAL; INFILTRATION; INTRACAUDAL; PERINEURAL
Status: DISCONTINUED | OUTPATIENT
Start: 2018-05-09 | End: 2018-05-09 | Stop reason: HOSPADM

## 2018-05-09 RX ORDER — SODIUM CHLORIDE 0.9 % (FLUSH) 0.9 %
10 SYRINGE (ML) INJECTION EVERY 12 HOURS SCHEDULED
Status: DISCONTINUED | OUTPATIENT
Start: 2018-05-09 | End: 2018-05-09 | Stop reason: HOSPADM

## 2018-05-09 RX ORDER — SODIUM CHLORIDE 0.9 % (FLUSH) 0.9 %
10 SYRINGE (ML) INJECTION PRN
Status: DISCONTINUED | OUTPATIENT
Start: 2018-05-09 | End: 2018-05-09 | Stop reason: HOSPADM

## 2018-05-09 RX ADMIN — PROPOFOL 20 MG: 10 INJECTION, EMULSION INTRAVENOUS at 10:58

## 2018-05-09 RX ADMIN — PROPOFOL 30 MG: 10 INJECTION, EMULSION INTRAVENOUS at 10:53

## 2018-05-09 RX ADMIN — PROPOFOL 20 MG: 10 INJECTION, EMULSION INTRAVENOUS at 10:50

## 2018-05-09 RX ADMIN — PROPOFOL 80 MG: 10 INJECTION, EMULSION INTRAVENOUS at 10:47

## 2018-05-09 RX ADMIN — PROPOFOL 20 MG: 10 INJECTION, EMULSION INTRAVENOUS at 11:00

## 2018-05-09 RX ADMIN — PROPOFOL 10 MG: 10 INJECTION, EMULSION INTRAVENOUS at 10:49

## 2018-05-09 RX ADMIN — PROPOFOL 20 MG: 10 INJECTION, EMULSION INTRAVENOUS at 10:56

## 2018-05-09 RX ADMIN — PROPOFOL 20 MG: 10 INJECTION, EMULSION INTRAVENOUS at 10:48

## 2018-05-31 ENCOUNTER — OFFICE VISIT (OUTPATIENT)
Dept: PRIMARY CARE CLINIC | Age: 68
End: 2018-05-31
Payer: MEDICARE

## 2018-05-31 VITALS
HEIGHT: 62 IN | OXYGEN SATURATION: 92 % | SYSTOLIC BLOOD PRESSURE: 120 MMHG | TEMPERATURE: 96.9 F | DIASTOLIC BLOOD PRESSURE: 80 MMHG | WEIGHT: 191 LBS | BODY MASS INDEX: 35.15 KG/M2 | RESPIRATION RATE: 14 BRPM | HEART RATE: 71 BPM

## 2018-05-31 DIAGNOSIS — M54.9 OTHER ACUTE BACK PAIN: Primary | ICD-10-CM

## 2018-05-31 PROCEDURE — 1123F ACP DISCUSS/DSCN MKR DOCD: CPT | Performed by: INTERNAL MEDICINE

## 2018-05-31 PROCEDURE — 3017F COLORECTAL CA SCREEN DOC REV: CPT | Performed by: INTERNAL MEDICINE

## 2018-05-31 PROCEDURE — 4040F PNEUMOC VAC/ADMIN/RCVD: CPT | Performed by: INTERNAL MEDICINE

## 2018-05-31 PROCEDURE — 99213 OFFICE O/P EST LOW 20 MIN: CPT | Performed by: INTERNAL MEDICINE

## 2018-05-31 PROCEDURE — G8399 PT W/DXA RESULTS DOCUMENT: HCPCS | Performed by: INTERNAL MEDICINE

## 2018-05-31 PROCEDURE — G8417 CALC BMI ABV UP PARAM F/U: HCPCS | Performed by: INTERNAL MEDICINE

## 2018-05-31 PROCEDURE — 1090F PRES/ABSN URINE INCON ASSESS: CPT | Performed by: INTERNAL MEDICINE

## 2018-05-31 PROCEDURE — G8427 DOCREV CUR MEDS BY ELIG CLIN: HCPCS | Performed by: INTERNAL MEDICINE

## 2018-05-31 PROCEDURE — 1036F TOBACCO NON-USER: CPT | Performed by: INTERNAL MEDICINE

## 2018-05-31 RX ORDER — POLYETHYLENE GLYCOL 3350, SODIUM CHLORIDE, SODIUM BICARBONATE, POTASSIUM CHLORIDE 420; 11.2; 5.72; 1.48 G/4L; G/4L; G/4L; G/4L
POWDER, FOR SOLUTION ORAL
COMMUNITY
Start: 2018-05-07 | End: 2018-07-03

## 2018-05-31 RX ORDER — CYCLOBENZAPRINE HCL 10 MG
10 TABLET ORAL 3 TIMES DAILY PRN
Qty: 30 TABLET | Refills: 2 | Status: SHIPPED | OUTPATIENT
Start: 2018-05-31 | End: 2018-06-10

## 2018-06-04 ASSESSMENT — ENCOUNTER SYMPTOMS
CHOKING: 0
ABDOMINAL DISTENTION: 0
BLOOD IN STOOL: 0
PHOTOPHOBIA: 0
BACK PAIN: 1
ABDOMINAL PAIN: 0
APNEA: 0
FACIAL SWELLING: 0

## 2018-07-03 ENCOUNTER — HOSPITAL ENCOUNTER (OUTPATIENT)
Dept: ULTRASOUND IMAGING | Age: 68
Discharge: HOME | End: 2018-07-05
Payer: MEDICARE

## 2018-07-03 ENCOUNTER — OFFICE VISIT (OUTPATIENT)
Dept: PRIMARY CARE CLINIC | Age: 68
End: 2018-07-03
Payer: MEDICARE

## 2018-07-03 VITALS
WEIGHT: 196 LBS | OXYGEN SATURATION: 96 % | BODY MASS INDEX: 36.07 KG/M2 | RESPIRATION RATE: 16 BRPM | HEART RATE: 75 BPM | DIASTOLIC BLOOD PRESSURE: 76 MMHG | TEMPERATURE: 98.2 F | SYSTOLIC BLOOD PRESSURE: 138 MMHG | HEIGHT: 62 IN

## 2018-07-03 DIAGNOSIS — M79.605 LEG PAIN, LEFT: ICD-10-CM

## 2018-07-03 DIAGNOSIS — S83.242A ACUTE MEDIAL MENISCUS TEAR OF LEFT KNEE, INITIAL ENCOUNTER: ICD-10-CM

## 2018-07-03 DIAGNOSIS — Z12.39 BREAST CANCER SCREENING: ICD-10-CM

## 2018-07-03 DIAGNOSIS — M25.562 CHRONIC PAIN OF LEFT KNEE: ICD-10-CM

## 2018-07-03 DIAGNOSIS — G89.29 CHRONIC PAIN OF LEFT KNEE: Primary | ICD-10-CM

## 2018-07-03 DIAGNOSIS — M25.562 CHRONIC PAIN OF LEFT KNEE: Primary | ICD-10-CM

## 2018-07-03 DIAGNOSIS — G89.29 CHRONIC PAIN OF LEFT KNEE: ICD-10-CM

## 2018-07-03 PROCEDURE — 1036F TOBACCO NON-USER: CPT | Performed by: INTERNAL MEDICINE

## 2018-07-03 PROCEDURE — 4040F PNEUMOC VAC/ADMIN/RCVD: CPT | Performed by: INTERNAL MEDICINE

## 2018-07-03 PROCEDURE — 3017F COLORECTAL CA SCREEN DOC REV: CPT | Performed by: INTERNAL MEDICINE

## 2018-07-03 PROCEDURE — G8427 DOCREV CUR MEDS BY ELIG CLIN: HCPCS | Performed by: INTERNAL MEDICINE

## 2018-07-03 PROCEDURE — G8399 PT W/DXA RESULTS DOCUMENT: HCPCS | Performed by: INTERNAL MEDICINE

## 2018-07-03 PROCEDURE — G8417 CALC BMI ABV UP PARAM F/U: HCPCS | Performed by: INTERNAL MEDICINE

## 2018-07-03 PROCEDURE — 1123F ACP DISCUSS/DSCN MKR DOCD: CPT | Performed by: INTERNAL MEDICINE

## 2018-07-03 PROCEDURE — 99214 OFFICE O/P EST MOD 30 MIN: CPT | Performed by: INTERNAL MEDICINE

## 2018-07-03 PROCEDURE — 93971 EXTREMITY STUDY: CPT

## 2018-07-03 PROCEDURE — 1090F PRES/ABSN URINE INCON ASSESS: CPT | Performed by: INTERNAL MEDICINE

## 2018-07-03 ASSESSMENT — PATIENT HEALTH QUESTIONNAIRE - PHQ9
SUM OF ALL RESPONSES TO PHQ QUESTIONS 1-9: 1
1. LITTLE INTEREST OR PLEASURE IN DOING THINGS: 0
2. FEELING DOWN, DEPRESSED OR HOPELESS: 1
SUM OF ALL RESPONSES TO PHQ9 QUESTIONS 1 & 2: 1

## 2018-07-07 ENCOUNTER — HOSPITAL ENCOUNTER (OUTPATIENT)
Dept: MRI IMAGING | Age: 68
Discharge: HOME | End: 2018-07-09
Payer: MEDICARE

## 2018-07-07 DIAGNOSIS — S83.242A ACUTE MEDIAL MENISCUS TEAR OF LEFT KNEE, INITIAL ENCOUNTER: ICD-10-CM

## 2018-07-07 PROCEDURE — 73721 MRI JNT OF LWR EXTRE W/O DYE: CPT

## 2018-07-10 ASSESSMENT — ENCOUNTER SYMPTOMS
BLOOD IN STOOL: 0
ABDOMINAL DISTENTION: 0
APNEA: 0
FACIAL SWELLING: 0
PHOTOPHOBIA: 0
CHOKING: 0

## 2018-07-11 NOTE — PROGRESS NOTES
Extremity -Left Robert    External Referral - Marisabel Nuno MD - Joint Replacement, Arthroscopic Surgery, Sports Med   2. Leg pain, left  Ultrasound Duplex Lower Extremity -Left Robert   3. Acute medial meniscus tear of left knee, initial encounter  MRI KNEE LEFT WO CONTRAST   4. Breast cancer screening  [facility] DIGITAL SCREEN W CAD BILATERAL           Plan:      Call or return to clinic prn if these symptoms worsen or fail to improve as anticipated.

## 2018-07-27 ENCOUNTER — HOSPITAL ENCOUNTER (OUTPATIENT)
Dept: WOMENS IMAGING | Age: 68
Discharge: HOME | End: 2018-07-29
Payer: MEDICARE

## 2018-07-27 DIAGNOSIS — Z12.39 BREAST CANCER SCREENING: ICD-10-CM

## 2018-07-27 PROCEDURE — 77067 SCR MAMMO BI INCL CAD: CPT

## 2018-10-25 ENCOUNTER — OFFICE VISIT (OUTPATIENT)
Dept: PRIMARY CARE CLINIC | Age: 68
End: 2018-10-25
Payer: MEDICARE

## 2018-10-25 VITALS
RESPIRATION RATE: 16 BRPM | DIASTOLIC BLOOD PRESSURE: 64 MMHG | HEIGHT: 62 IN | OXYGEN SATURATION: 95 % | BODY MASS INDEX: 35.7 KG/M2 | WEIGHT: 194 LBS | TEMPERATURE: 97.8 F | HEART RATE: 67 BPM | SYSTOLIC BLOOD PRESSURE: 116 MMHG

## 2018-10-25 DIAGNOSIS — E78.00 PURE HYPERCHOLESTEROLEMIA: Primary | ICD-10-CM

## 2018-10-25 DIAGNOSIS — J42 CHRONIC BRONCHITIS, UNSPECIFIED CHRONIC BRONCHITIS TYPE (HCC): ICD-10-CM

## 2018-10-25 DIAGNOSIS — Z11.59 ENCOUNTER FOR HEPATITIS C SCREENING TEST FOR LOW RISK PATIENT: ICD-10-CM

## 2018-10-25 DIAGNOSIS — Z23 NEED FOR INFLUENZA VACCINATION: ICD-10-CM

## 2018-10-25 DIAGNOSIS — R73.9 HYPERGLYCEMIA: ICD-10-CM

## 2018-10-25 DIAGNOSIS — J98.01 BRONCHIAL SPASMS: ICD-10-CM

## 2018-10-25 DIAGNOSIS — E03.9 HYPOTHYROIDISM, UNSPECIFIED TYPE: ICD-10-CM

## 2018-10-25 PROCEDURE — G8417 CALC BMI ABV UP PARAM F/U: HCPCS | Performed by: INTERNAL MEDICINE

## 2018-10-25 PROCEDURE — 90662 IIV NO PRSV INCREASED AG IM: CPT | Performed by: INTERNAL MEDICINE

## 2018-10-25 PROCEDURE — G0008 ADMIN INFLUENZA VIRUS VAC: HCPCS | Performed by: INTERNAL MEDICINE

## 2018-10-25 PROCEDURE — 4040F PNEUMOC VAC/ADMIN/RCVD: CPT | Performed by: INTERNAL MEDICINE

## 2018-10-25 PROCEDURE — 3017F COLORECTAL CA SCREEN DOC REV: CPT | Performed by: INTERNAL MEDICINE

## 2018-10-25 PROCEDURE — 3023F SPIROM DOC REV: CPT | Performed by: INTERNAL MEDICINE

## 2018-10-25 PROCEDURE — 1090F PRES/ABSN URINE INCON ASSESS: CPT | Performed by: INTERNAL MEDICINE

## 2018-10-25 PROCEDURE — G8926 SPIRO NO PERF OR DOC: HCPCS | Performed by: INTERNAL MEDICINE

## 2018-10-25 PROCEDURE — 99214 OFFICE O/P EST MOD 30 MIN: CPT | Performed by: INTERNAL MEDICINE

## 2018-10-25 PROCEDURE — G8482 FLU IMMUNIZE ORDER/ADMIN: HCPCS | Performed by: INTERNAL MEDICINE

## 2018-10-25 PROCEDURE — 94060 EVALUATION OF WHEEZING: CPT | Performed by: INTERNAL MEDICINE

## 2018-10-25 PROCEDURE — 1036F TOBACCO NON-USER: CPT | Performed by: INTERNAL MEDICINE

## 2018-10-25 PROCEDURE — 1101F PT FALLS ASSESS-DOCD LE1/YR: CPT | Performed by: INTERNAL MEDICINE

## 2018-10-25 PROCEDURE — G8427 DOCREV CUR MEDS BY ELIG CLIN: HCPCS | Performed by: INTERNAL MEDICINE

## 2018-10-25 PROCEDURE — 1123F ACP DISCUSS/DSCN MKR DOCD: CPT | Performed by: INTERNAL MEDICINE

## 2018-10-25 PROCEDURE — G8399 PT W/DXA RESULTS DOCUMENT: HCPCS | Performed by: INTERNAL MEDICINE

## 2018-10-25 RX ORDER — SIMVASTATIN 10 MG
TABLET ORAL
Qty: 90 TABLET | Refills: 3 | Status: SHIPPED | OUTPATIENT
Start: 2018-10-25 | End: 2019-03-07 | Stop reason: SDUPTHER

## 2018-10-25 RX ORDER — IBUPROFEN 800 MG/1
800 TABLET ORAL 2 TIMES DAILY PRN
Qty: 60 TABLET | Refills: 11 | Status: SHIPPED | OUTPATIENT
Start: 2018-10-25 | End: 2020-09-17

## 2018-10-25 NOTE — PROGRESS NOTES
Review of Systems   Constitutional: Negative for appetite change, chills and fever. HENT: Negative for facial swelling and nosebleeds. Eyes: Negative for photophobia and visual disturbance. Respiratory: Positive for wheezing. Negative for apnea and choking. Cardiovascular: Negative for chest pain and palpitations. Gastrointestinal: Negative for abdominal distention and blood in stool. Genitourinary: Negative for enuresis, hematuria and vaginal bleeding. Musculoskeletal: Negative for gait problem and joint swelling. Skin: Negative for rash. Neurological: Negative for syncope and speech difficulty. Hematological: Does not bruise/bleed easily. Psychiatric/Behavioral: Negative for hallucinations and suicidal ideas. Vitals:    10/25/18 1250   BP: 116/64   Site: Left Upper Arm   Position: Sitting   Cuff Size: Large Adult   Pulse: 67   Resp: 16   Temp: 97.8 °F (36.6 °C)   SpO2: 95%   Weight: 194 lb (88 kg)   Height: 5' 2\" (1.575 m)       Physical Exam   Constitutional: She is oriented to person, place, and time. She appears well-developed and well-nourished. HENT:   Head: Normocephalic and atraumatic. Eyes: Pupils are equal, round, and reactive to light. EOM are normal.   Neck: Normal range of motion. Neck supple. Cardiovascular: Normal rate, regular rhythm and normal heart sounds. Pulmonary/Chest: Breath sounds normal.   Abdominal: She exhibits no distension. Musculoskeletal: Normal range of motion. Neurological: She is oriented to person, place, and time. Psychiatric: She has a normal mood and affect. I discuss options on lactose intolerace  Assessment/Plan  Lenora was seen today for lactose intolerance and health maintenance. Diagnoses and all orders for this visit:    Pure hypercholesterolemia  -     Comprehensive Metabolic Panel;  Future  -     Lipid Panel  -     simvastatin (ZOCOR) 10 MG tablet; TAKE ONE TABLET BY MOUTH ONCE DAILY IN THE EVENING    Chronic

## 2018-10-26 ENCOUNTER — HOSPITAL ENCOUNTER (OUTPATIENT)
Dept: LAB | Age: 68
Discharge: HOME | End: 2018-10-26
Payer: MEDICARE

## 2018-10-26 LAB
ALBUMIN SERPL-MCNC: 4.1 G/DL (ref 3.9–4.9)
ALP BLD-CCNC: 79 U/L (ref 40–130)
ALT SERPL-CCNC: 19 U/L (ref 0–33)
ANION GAP SERPL CALCULATED.3IONS-SCNC: 14 MEQ/L (ref 7–13)
AST SERPL-CCNC: 16 U/L (ref 0–35)
BASOPHILS ABSOLUTE: 0 K/UL (ref 0–0.2)
BASOPHILS RELATIVE PERCENT: 0.6 %
BILIRUB SERPL-MCNC: 0.3 MG/DL (ref 0–1.2)
BUN BLDV-MCNC: 16 MG/DL (ref 8–23)
CALCIUM SERPL-MCNC: 9 MG/DL (ref 8.6–10.2)
CHLORIDE BLD-SCNC: 99 MEQ/L (ref 98–107)
CHOLESTEROL, TOTAL: 176 MG/DL (ref 0–199)
CO2: 27 MEQ/L (ref 22–29)
CREAT SERPL-MCNC: 0.45 MG/DL (ref 0.5–0.9)
EOSINOPHILS ABSOLUTE: 0.1 K/UL (ref 0–0.7)
EOSINOPHILS RELATIVE PERCENT: 1.5 %
GFR AFRICAN AMERICAN: >60
GFR NON-AFRICAN AMERICAN: >60
GLOBULIN: 2.6 G/DL (ref 2.3–3.5)
GLUCOSE BLD-MCNC: 78 MG/DL (ref 74–109)
HBA1C MFR BLD: 6 % (ref 4.8–5.9)
HCT VFR BLD CALC: 40.7 % (ref 37–47)
HDLC SERPL-MCNC: 42 MG/DL (ref 40–59)
HEMOGLOBIN: 13.5 G/DL (ref 12–16)
HEPATITIS C ANTIBODY INTERPRETATION: NORMAL
LDL CHOLESTEROL CALCULATED: 110 MG/DL (ref 0–129)
LYMPHOCYTES ABSOLUTE: 2 K/UL (ref 1–4.8)
LYMPHOCYTES RELATIVE PERCENT: 30.4 %
MCH RBC QN AUTO: 28.6 PG (ref 27–31.3)
MCHC RBC AUTO-ENTMCNC: 33.1 % (ref 33–37)
MCV RBC AUTO: 86.3 FL (ref 82–100)
MONOCYTES ABSOLUTE: 0.5 K/UL (ref 0.2–0.8)
MONOCYTES RELATIVE PERCENT: 8 %
NEUTROPHILS ABSOLUTE: 3.8 K/UL (ref 1.4–6.5)
NEUTROPHILS RELATIVE PERCENT: 59.5 %
PDW BLD-RTO: 13.5 % (ref 11.5–14.5)
PLATELET # BLD: 306 K/UL (ref 130–400)
POTASSIUM SERPL-SCNC: 3.9 MEQ/L (ref 3.5–5.1)
RBC # BLD: 4.72 M/UL (ref 4.2–5.4)
SODIUM BLD-SCNC: 140 MEQ/L (ref 132–144)
TOTAL PROTEIN: 6.7 G/DL (ref 6.4–8.1)
TRIGL SERPL-MCNC: 120 MG/DL (ref 0–200)
TSH SERPL DL<=0.05 MIU/L-ACNC: 1.64 UIU/ML (ref 0.27–4.2)
WBC # BLD: 6.5 K/UL (ref 4.8–10.8)

## 2018-10-26 PROCEDURE — 80061 LIPID PANEL: CPT

## 2018-10-26 PROCEDURE — 84443 ASSAY THYROID STIM HORMONE: CPT

## 2018-10-26 PROCEDURE — 86803 HEPATITIS C AB TEST: CPT

## 2018-10-26 PROCEDURE — 83036 HEMOGLOBIN GLYCOSYLATED A1C: CPT

## 2018-10-26 PROCEDURE — 80053 COMPREHEN METABOLIC PANEL: CPT

## 2018-10-26 PROCEDURE — 85025 COMPLETE CBC W/AUTO DIFF WBC: CPT

## 2018-10-30 ASSESSMENT — COPD QUESTIONNAIRES: COPD: 1

## 2018-10-30 ASSESSMENT — ENCOUNTER SYMPTOMS
WHEEZING: 1
CHEST TIGHTNESS: 1
CHOKING: 0
ABDOMINAL DISTENTION: 0
APNEA: 0
FACIAL SWELLING: 0
BLOOD IN STOOL: 0
PHOTOPHOBIA: 0

## 2019-03-06 DIAGNOSIS — E78.00 PURE HYPERCHOLESTEROLEMIA: ICD-10-CM

## 2019-03-07 RX ORDER — SIMVASTATIN 10 MG
TABLET ORAL
Qty: 90 TABLET | Refills: 1 | Status: SHIPPED | OUTPATIENT
Start: 2019-03-07 | End: 2019-08-29 | Stop reason: SDUPTHER

## 2019-04-15 ENCOUNTER — OFFICE VISIT (OUTPATIENT)
Dept: FAMILY MEDICINE CLINIC | Age: 69
End: 2019-04-15
Payer: MEDICARE

## 2019-04-15 VITALS
OXYGEN SATURATION: 95 % | WEIGHT: 197 LBS | SYSTOLIC BLOOD PRESSURE: 130 MMHG | TEMPERATURE: 97.8 F | HEART RATE: 73 BPM | DIASTOLIC BLOOD PRESSURE: 81 MMHG | HEIGHT: 62 IN | BODY MASS INDEX: 36.25 KG/M2 | RESPIRATION RATE: 14 BRPM

## 2019-04-15 DIAGNOSIS — M25.562 CHRONIC PAIN OF BOTH KNEES: ICD-10-CM

## 2019-04-15 DIAGNOSIS — M25.561 CHRONIC PAIN OF BOTH KNEES: ICD-10-CM

## 2019-04-15 DIAGNOSIS — N39.0 URINARY TRACT INFECTION WITHOUT HEMATURIA, SITE UNSPECIFIED: ICD-10-CM

## 2019-04-15 DIAGNOSIS — G89.29 CHRONIC PAIN OF BOTH KNEES: ICD-10-CM

## 2019-04-15 DIAGNOSIS — L30.9 ECZEMA, UNSPECIFIED TYPE: ICD-10-CM

## 2019-04-15 DIAGNOSIS — R32 URINARY INCONTINENCE, UNSPECIFIED TYPE: Primary | ICD-10-CM

## 2019-04-15 LAB
BILIRUBIN, POC: NORMAL
BLOOD URINE, POC: NORMAL
CLARITY, POC: NORMAL
COLOR, POC: NORMAL
GLUCOSE URINE, POC: NORMAL
KETONES, POC: NORMAL
LEUKOCYTE EST, POC: NORMAL
NITRITE, POC: NORMAL
PH, POC: 7.5
PROTEIN, POC: NORMAL
SPECIFIC GRAVITY, POC: 1.01
UROBILINOGEN, POC: 3.5

## 2019-04-15 PROCEDURE — 1123F ACP DISCUSS/DSCN MKR DOCD: CPT | Performed by: INTERNAL MEDICINE

## 2019-04-15 PROCEDURE — 1090F PRES/ABSN URINE INCON ASSESS: CPT | Performed by: INTERNAL MEDICINE

## 2019-04-15 PROCEDURE — 0509F URINE INCON PLAN DOCD: CPT | Performed by: INTERNAL MEDICINE

## 2019-04-15 PROCEDURE — 1036F TOBACCO NON-USER: CPT | Performed by: INTERNAL MEDICINE

## 2019-04-15 PROCEDURE — 3017F COLORECTAL CA SCREEN DOC REV: CPT | Performed by: INTERNAL MEDICINE

## 2019-04-15 PROCEDURE — 4040F PNEUMOC VAC/ADMIN/RCVD: CPT | Performed by: INTERNAL MEDICINE

## 2019-04-15 PROCEDURE — G8427 DOCREV CUR MEDS BY ELIG CLIN: HCPCS | Performed by: INTERNAL MEDICINE

## 2019-04-15 PROCEDURE — 99214 OFFICE O/P EST MOD 30 MIN: CPT | Performed by: INTERNAL MEDICINE

## 2019-04-15 PROCEDURE — G8399 PT W/DXA RESULTS DOCUMENT: HCPCS | Performed by: INTERNAL MEDICINE

## 2019-04-15 PROCEDURE — G8417 CALC BMI ABV UP PARAM F/U: HCPCS | Performed by: INTERNAL MEDICINE

## 2019-04-15 PROCEDURE — 81002 URINALYSIS NONAUTO W/O SCOPE: CPT | Performed by: INTERNAL MEDICINE

## 2019-04-15 RX ORDER — MOMETASONE FUROATE 1 MG/G
CREAM TOPICAL
Qty: 50 G | Refills: 5 | Status: SHIPPED | OUTPATIENT
Start: 2019-04-15 | End: 2020-09-17

## 2019-04-15 RX ORDER — LEVOFLOXACIN 500 MG/1
500 TABLET, FILM COATED ORAL DAILY
Qty: 10 TABLET | Refills: 0 | Status: SHIPPED | OUTPATIENT
Start: 2019-04-15 | End: 2019-04-25

## 2019-04-15 ASSESSMENT — PATIENT HEALTH QUESTIONNAIRE - PHQ9
SUM OF ALL RESPONSES TO PHQ9 QUESTIONS 1 & 2: 0
SUM OF ALL RESPONSES TO PHQ QUESTIONS 1-9: 0
2. FEELING DOWN, DEPRESSED OR HOPELESS: 0
1. LITTLE INTEREST OR PLEASURE IN DOING THINGS: 0
SUM OF ALL RESPONSES TO PHQ QUESTIONS 1-9: 0

## 2019-04-15 NOTE — PROGRESS NOTES
Jazmin Betancourt 76 y.o. female presents today with   Chief Complaint   Patient presents with    Pruritis     Neck redness, itching x1 month, Pt using cortisone cream with some relief.  Incontinence     Incontinence x3 months. Dysuria    This is a recurrent problem. The current episode started in the past 7 days. The problem occurs intermittently. The problem has been waxing and waning. The quality of the pain is described as aching. The pain is at a severity of 1/10. The pain is mild. Associated symptoms include urgency. Pertinent negatives include no chills or hematuria. Rash   This is a recurrent problem. The current episode started more than 1 year ago. The problem has been waxing and waning since onset. The rash is diffuse. The rash is characterized by itchiness and dryness. Pertinent negatives include no fever. Knee Pain    Incident onset: years. The incident occurred at home. There was no injury mechanism. The pain is present in the left knee and right knee. The pain is at a severity of 5/10. The pain is moderate. The pain has been worsening since onset. She has tried NSAIDs for the symptoms. The treatment provided mild relief. Past Medical History:   Diagnosis Date    Acid reflux     Chronic back pain     Hyperlipidemia     Hypertension     Osteoarthritis     shots in knees by ortho     There are no active problems to display for this patient. Past Surgical History:   Procedure Laterality Date    APPENDECTOMY  1959    BREAST SURGERY      HYSTERECTOMY  1992    [place] COLON CA SCRN NOT  [address] [facility] N/A 5/9/2018    COLONOSCOPY performed by Luz Alvarez MD at [address] [facility]     No family history on file.   Social History     Socioeconomic History    Marital status:      Spouse name: None    Number of children: None    Years of education: None    Highest education level: None   Occupational History    None   Social Needs    Financial resource strain: None    Food insecurity:     Worry: None     Inability: None    Transportation needs:     Medical: None     Non-medical: None   Tobacco Use    Smoking status: Never Smoker    Smokeless tobacco: Never Used   Substance and Sexual Activity    Alcohol use: Yes     Comment: rarely    Drug use: No    Sexual activity: None   Lifestyle    Physical activity:     Days per week: None     Minutes per session: None    Stress: None   Relationships    Social connections:     Talks on phone: None     Gets together: None     Attends Anglican service: None     Active member of club or organization: None     Attends meetings of clubs or organizations: None     Relationship status: None    Intimate partner violence:     Fear of current or ex partner: None     Emotionally abused: None     Physically abused: None     Forced sexual activity: None   Other Topics Concern    None   Social History Narrative    None     Allergies   Allergen Reactions    Novocain [Procaine]        Review of Systems   Constitutional: Negative for chills and fever. HENT: Negative for facial swelling and nosebleeds. Eyes: Negative for photophobia and visual disturbance. Respiratory: Negative for apnea and choking. Cardiovascular: Negative for chest pain and palpitations. Gastrointestinal: Negative for abdominal distention and blood in stool. Genitourinary: Positive for dysuria and urgency. Negative for enuresis, hematuria and vaginal bleeding. Musculoskeletal: Negative for gait problem and joint swelling. Skin: Positive for rash. Neurological: Negative for syncope and speech difficulty. Hematological: Does not bruise/bleed easily. Psychiatric/Behavioral: Negative for hallucinations and suicidal ideas.            Vitals:    04/15/19 1107   BP: 130/81   Site: Left Upper Arm   Position: Sitting   Cuff Size: Large Adult   Pulse: 73   Resp: 14   Temp: 97.8 °F (36.6 °C)   SpO2: 95%   Weight: 197 lb (89.4 kg)   Height: 5' 2\" (1.575 m) Physical Exam   Constitutional: She is oriented to person, place, and time. She appears well-developed and well-nourished. HENT:   Head: Normocephalic and atraumatic. Eyes: Pupils are equal, round, and reactive to light. EOM are normal.   Neck: Normal range of motion. Neck supple. Cardiovascular: Normal rate, regular rhythm and normal heart sounds. Pulmonary/Chest: Breath sounds normal. No respiratory distress. She has no wheezes. She has no rales. Abdominal: Soft. Bowel sounds are normal.   Musculoskeletal: Normal range of motion. Neurological: She is oriented to person, place, and time. Psychiatric: She has a normal mood and affect. Assessment/Plan  Miley Arroyo was seen today for pruritis and incontinence. Diagnoses and all orders for this visit:    Urinary incontinence, unspecified type  -     POCT Urinalysis no Micro    Urinary tract infection without hematuria, site unspecified  -     levofloxacin (LEVAQUIN) 500 MG tablet; Take 1 tablet by mouth daily for 10 days    Eczema, unspecified type  -     mometasone (ELOCON) 0.1 % cream; Apply topically daily. Chronic pain of both knees  -     diclofenac sodium 1 % GEL; Apply 4 g topically 4 times daily        No follow-ups on file.     Maury Officer, MD

## 2019-04-19 ASSESSMENT — ENCOUNTER SYMPTOMS
PHOTOPHOBIA: 0
FACIAL SWELLING: 0
APNEA: 0
BLOOD IN STOOL: 0
ABDOMINAL DISTENTION: 0
CHOKING: 0

## 2019-05-21 ENCOUNTER — TELEPHONE (OUTPATIENT)
Dept: FAMILY MEDICINE CLINIC | Age: 69
End: 2019-05-21

## 2019-05-21 NOTE — TELEPHONE ENCOUNTER
Patient was discharged from [facility] last Thursday She was given amlodipine besylate 2.5 mg for high blood pressure. She takes once a day in morning. Per patient, her blood pressure was 124/74 this morning, and not sure if she should take it. Please advise.

## 2019-05-28 ENCOUNTER — OFFICE VISIT (OUTPATIENT)
Dept: FAMILY MEDICINE CLINIC | Age: 69
End: 2019-05-28
Payer: MEDICARE

## 2019-05-28 VITALS
RESPIRATION RATE: 16 BRPM | WEIGHT: 196 LBS | HEIGHT: 62 IN | HEART RATE: 70 BPM | SYSTOLIC BLOOD PRESSURE: 118 MMHG | OXYGEN SATURATION: 95 % | DIASTOLIC BLOOD PRESSURE: 62 MMHG | TEMPERATURE: 97.2 F | BODY MASS INDEX: 36.07 KG/M2

## 2019-05-28 DIAGNOSIS — I10 ESSENTIAL HYPERTENSION: Primary | ICD-10-CM

## 2019-05-28 DIAGNOSIS — Z91.81 AT HIGH RISK FOR FALLS: ICD-10-CM

## 2019-05-28 PROCEDURE — 3017F COLORECTAL CA SCREEN DOC REV: CPT | Performed by: INTERNAL MEDICINE

## 2019-05-28 PROCEDURE — 1123F ACP DISCUSS/DSCN MKR DOCD: CPT | Performed by: INTERNAL MEDICINE

## 2019-05-28 PROCEDURE — 4040F PNEUMOC VAC/ADMIN/RCVD: CPT | Performed by: INTERNAL MEDICINE

## 2019-05-28 PROCEDURE — 1090F PRES/ABSN URINE INCON ASSESS: CPT | Performed by: INTERNAL MEDICINE

## 2019-05-28 PROCEDURE — G8417 CALC BMI ABV UP PARAM F/U: HCPCS | Performed by: INTERNAL MEDICINE

## 2019-05-28 PROCEDURE — 99213 OFFICE O/P EST LOW 20 MIN: CPT | Performed by: INTERNAL MEDICINE

## 2019-05-28 PROCEDURE — G8427 DOCREV CUR MEDS BY ELIG CLIN: HCPCS | Performed by: INTERNAL MEDICINE

## 2019-05-28 PROCEDURE — 1036F TOBACCO NON-USER: CPT | Performed by: INTERNAL MEDICINE

## 2019-05-28 PROCEDURE — G8399 PT W/DXA RESULTS DOCUMENT: HCPCS | Performed by: INTERNAL MEDICINE

## 2019-05-28 RX ORDER — AMLODIPINE BESYLATE 2.5 MG/1
TABLET ORAL
Refills: 0 | COMMUNITY
Start: 2019-05-16 | End: 2019-05-28 | Stop reason: SDUPTHER

## 2019-05-28 RX ORDER — AMLODIPINE BESYLATE 2.5 MG/1
2.5 TABLET ORAL DAILY
Qty: 30 TABLET | Refills: 5 | Status: SHIPPED | OUTPATIENT
Start: 2019-05-28 | End: 2019-08-29 | Stop reason: SDUPTHER

## 2019-06-03 ASSESSMENT — ENCOUNTER SYMPTOMS
APNEA: 0
PHOTOPHOBIA: 0
ABDOMINAL DISTENTION: 0
FACIAL SWELLING: 0
BLOOD IN STOOL: 0
CHOKING: 0

## 2019-08-29 ENCOUNTER — OFFICE VISIT (OUTPATIENT)
Dept: FAMILY MEDICINE CLINIC | Age: 69
End: 2019-08-29
Payer: MEDICARE

## 2019-08-29 VITALS
HEART RATE: 70 BPM | OXYGEN SATURATION: 95 % | TEMPERATURE: 97.6 F | WEIGHT: 189 LBS | DIASTOLIC BLOOD PRESSURE: 80 MMHG | HEIGHT: 62 IN | RESPIRATION RATE: 14 BRPM | SYSTOLIC BLOOD PRESSURE: 137 MMHG | BODY MASS INDEX: 34.78 KG/M2

## 2019-08-29 DIAGNOSIS — I10 ESSENTIAL HYPERTENSION: ICD-10-CM

## 2019-08-29 DIAGNOSIS — H66.90 ACUTE OTITIS MEDIA, UNSPECIFIED OTITIS MEDIA TYPE: Primary | ICD-10-CM

## 2019-08-29 DIAGNOSIS — E78.00 PURE HYPERCHOLESTEROLEMIA: ICD-10-CM

## 2019-08-29 PROCEDURE — G8427 DOCREV CUR MEDS BY ELIG CLIN: HCPCS | Performed by: INTERNAL MEDICINE

## 2019-08-29 PROCEDURE — 1036F TOBACCO NON-USER: CPT | Performed by: INTERNAL MEDICINE

## 2019-08-29 PROCEDURE — 4040F PNEUMOC VAC/ADMIN/RCVD: CPT | Performed by: INTERNAL MEDICINE

## 2019-08-29 PROCEDURE — 1090F PRES/ABSN URINE INCON ASSESS: CPT | Performed by: INTERNAL MEDICINE

## 2019-08-29 PROCEDURE — 1123F ACP DISCUSS/DSCN MKR DOCD: CPT | Performed by: INTERNAL MEDICINE

## 2019-08-29 PROCEDURE — G8417 CALC BMI ABV UP PARAM F/U: HCPCS | Performed by: INTERNAL MEDICINE

## 2019-08-29 PROCEDURE — 99213 OFFICE O/P EST LOW 20 MIN: CPT | Performed by: INTERNAL MEDICINE

## 2019-08-29 PROCEDURE — 3017F COLORECTAL CA SCREEN DOC REV: CPT | Performed by: INTERNAL MEDICINE

## 2019-08-29 PROCEDURE — G8399 PT W/DXA RESULTS DOCUMENT: HCPCS | Performed by: INTERNAL MEDICINE

## 2019-08-29 RX ORDER — SIMVASTATIN 10 MG
TABLET ORAL
Qty: 90 TABLET | Refills: 3 | Status: SHIPPED | OUTPATIENT
Start: 2019-08-29 | End: 2020-09-17

## 2019-08-29 RX ORDER — AMLODIPINE BESYLATE 2.5 MG/1
2.5 TABLET ORAL DAILY
Qty: 90 TABLET | Refills: 3 | Status: SHIPPED | OUTPATIENT
Start: 2019-08-29 | End: 2020-09-17 | Stop reason: SDUPTHER

## 2019-08-29 RX ORDER — CEFUROXIME AXETIL 500 MG/1
500 TABLET ORAL 2 TIMES DAILY
Qty: 20 TABLET | Refills: 1 | Status: SHIPPED | OUTPATIENT
Start: 2019-08-29 | End: 2022-07-22 | Stop reason: SDUPTHER

## 2019-09-01 ASSESSMENT — ENCOUNTER SYMPTOMS
PHOTOPHOBIA: 0
WHEEZING: 0
NAUSEA: 0
ABDOMINAL PAIN: 0
BLOOD IN STOOL: 0
CHOKING: 0
COUGH: 0
ABDOMINAL DISTENTION: 0
BLURRED VISION: 0
APNEA: 0
RHINORRHEA: 1
FACIAL SWELLING: 0

## 2019-09-03 ENCOUNTER — TELEPHONE (OUTPATIENT)
Dept: FAMILY MEDICINE CLINIC | Age: 69
End: 2019-09-03

## 2019-09-03 DIAGNOSIS — H66.90 ACUTE OTITIS MEDIA, UNSPECIFIED OTITIS MEDIA TYPE: Primary | ICD-10-CM

## 2019-09-03 RX ORDER — SULFAMETHOXAZOLE AND TRIMETHOPRIM 800; 160 MG/1; MG/1
1 TABLET ORAL 2 TIMES DAILY
Qty: 20 TABLET | Refills: 0 | Status: SHIPPED | OUTPATIENT
Start: 2019-09-03 | End: 2019-09-12 | Stop reason: SDUPTHER

## 2019-09-12 ENCOUNTER — OFFICE VISIT (OUTPATIENT)
Dept: FAMILY MEDICINE CLINIC | Age: 69
End: 2019-09-12
Payer: MEDICARE

## 2019-09-12 VITALS
SYSTOLIC BLOOD PRESSURE: 130 MMHG | DIASTOLIC BLOOD PRESSURE: 78 MMHG | HEIGHT: 62 IN | RESPIRATION RATE: 14 BRPM | BODY MASS INDEX: 34.01 KG/M2 | OXYGEN SATURATION: 96 % | WEIGHT: 184.8 LBS | HEART RATE: 80 BPM | TEMPERATURE: 97.5 F

## 2019-09-12 DIAGNOSIS — H66.002 NON-RECURRENT ACUTE SUPPURATIVE OTITIS MEDIA OF LEFT EAR WITHOUT SPONTANEOUS RUPTURE OF TYMPANIC MEMBRANE: Primary | ICD-10-CM

## 2019-09-12 DIAGNOSIS — Z23 NEED FOR INFLUENZA VACCINATION: ICD-10-CM

## 2019-09-12 DIAGNOSIS — H66.90 ACUTE OTITIS MEDIA, UNSPECIFIED OTITIS MEDIA TYPE: ICD-10-CM

## 2019-09-12 DIAGNOSIS — Z00.00 ROUTINE GENERAL MEDICAL EXAMINATION AT A HEALTH CARE FACILITY: Primary | ICD-10-CM

## 2019-09-12 PROCEDURE — 1090F PRES/ABSN URINE INCON ASSESS: CPT | Performed by: INTERNAL MEDICINE

## 2019-09-12 PROCEDURE — G8427 DOCREV CUR MEDS BY ELIG CLIN: HCPCS | Performed by: INTERNAL MEDICINE

## 2019-09-12 PROCEDURE — G8417 CALC BMI ABV UP PARAM F/U: HCPCS | Performed by: INTERNAL MEDICINE

## 2019-09-12 PROCEDURE — 4040F PNEUMOC VAC/ADMIN/RCVD: CPT | Performed by: NURSE PRACTITIONER

## 2019-09-12 PROCEDURE — 1123F ACP DISCUSS/DSCN MKR DOCD: CPT | Performed by: INTERNAL MEDICINE

## 2019-09-12 PROCEDURE — G0439 PPPS, SUBSEQ VISIT: HCPCS | Performed by: NURSE PRACTITIONER

## 2019-09-12 PROCEDURE — 99213 OFFICE O/P EST LOW 20 MIN: CPT | Performed by: INTERNAL MEDICINE

## 2019-09-12 PROCEDURE — 4040F PNEUMOC VAC/ADMIN/RCVD: CPT | Performed by: INTERNAL MEDICINE

## 2019-09-12 PROCEDURE — 1123F ACP DISCUSS/DSCN MKR DOCD: CPT | Performed by: NURSE PRACTITIONER

## 2019-09-12 PROCEDURE — 1036F TOBACCO NON-USER: CPT | Performed by: INTERNAL MEDICINE

## 2019-09-12 PROCEDURE — 3017F COLORECTAL CA SCREEN DOC REV: CPT | Performed by: NURSE PRACTITIONER

## 2019-09-12 PROCEDURE — G0008 ADMIN INFLUENZA VIRUS VAC: HCPCS | Performed by: NURSE PRACTITIONER

## 2019-09-12 PROCEDURE — G8399 PT W/DXA RESULTS DOCUMENT: HCPCS | Performed by: INTERNAL MEDICINE

## 2019-09-12 PROCEDURE — 3017F COLORECTAL CA SCREEN DOC REV: CPT | Performed by: INTERNAL MEDICINE

## 2019-09-12 PROCEDURE — 90662 IIV NO PRSV INCREASED AG IM: CPT | Performed by: NURSE PRACTITIONER

## 2019-09-12 RX ORDER — SULFAMETHOXAZOLE AND TRIMETHOPRIM 800; 160 MG/1; MG/1
1 TABLET ORAL 2 TIMES DAILY
Qty: 20 TABLET | Refills: 0 | Status: SHIPPED | OUTPATIENT
Start: 2019-09-12 | End: 2019-09-22

## 2019-09-12 ASSESSMENT — PATIENT HEALTH QUESTIONNAIRE - PHQ9
2. FEELING DOWN, DEPRESSED OR HOPELESS: 0
SUM OF ALL RESPONSES TO PHQ QUESTIONS 1-9: 0
SUM OF ALL RESPONSES TO PHQ QUESTIONS 1-9: 0
SUM OF ALL RESPONSES TO PHQ9 QUESTIONS 1 & 2: 0
1. LITTLE INTEREST OR PLEASURE IN DOING THINGS: 0

## 2019-09-12 NOTE — PROGRESS NOTES
Habits/Nutrition Interventions:  · Dental exam overdue:  patient encouraged to make appointment with his/her dentist    Hearing/Vision:  No exam data present  Hearing/Vision  Do you or your family notice any trouble with your hearing?: No  Do you have difficulty driving, watching TV, or doing any of your daily activities because of your eyesight?: No  Have you had an eye exam within the past year?: (!) No  Hearing/Vision Interventions:  · Vision concerns:  patient encouraged to make appointment with his/her eye specialist    Safety:  Safety  Do you have working smoke detectors?: Yes  Have all throw rugs been removed or fastened?: (!) No  Do you have non-slip mats or surfaces in all bathtubs/showers?: (!) No  Do all of your stairways have a railing or banister?: Yes  Are your doorways, halls and stairs free of clutter?: Yes  Do you always fasten your seatbelt when you are in a car?: Yes  Safety Interventions:  · Home safety tips provided    Personalized Preventive Plan   Current Health Maintenance Status  Immunization History   Administered Date(s) Administered    Influenza Vaccine, unspecified formulation 10/31/2016    Influenza, High Dose (Fluzone 65 yrs and older) 10/31/2016, 11/03/2017, 10/25/2018    Pneumococcal Conjugate 13-valent (Roslynn Deep) 04/18/2017    Pneumococcal Polysaccharide ([id number]) 04/18/2018        Health Maintenance   Topic Date Due    DTaP/Tdap/Td vaccine (1 - Tdap) 12/05/1969    Shingles Vaccine (1 of 2) 12/05/2000    Annual Wellness Visit (AWV)  05/29/2019    Flu vaccine (1) 09/01/2019    A1C test (Diabetic or Prediabetic)  10/26/2019    Breast cancer screen  07/27/2020    Colon cancer screen colonoscopy  05/09/2023    Lipid screen  05/16/2024    DEXA (modify frequency per FRAX score)  Completed    Pneumococcal 65+ years Vaccine  Completed    Hepatitis C screen  Completed     Recommendations for Preventive Services Due: see orders and patient instructions/AVS.  .   Recommended

## 2019-09-12 NOTE — PROGRESS NOTES
Caldwell Lesch 76 y.o. female presents today with   Chief Complaint   Patient presents with    Otalgia     f/u on Left ear pain. Pt states it is getting better, is not getting sharp pain. Otalgia    There is pain in the left ear. This is a new problem. The current episode started in the past 7 days. The problem has been rapidly improving. The pain is at a severity of 1/10. The pain is mild. Associated symptoms include hearing loss and rhinorrhea. Pertinent negatives include no rash. better left ear. Past Medical History:   Diagnosis Date    Acid reflux     Chronic back pain     Hyperlipidemia     Hypertension     Osteoarthritis     shots in knees by ortho     There are no active problems to display for this patient. Past Surgical History:   Procedure Laterality Date    APPENDECTOMY  1959    BREAST SURGERY      HYSTERECTOMY  1992    [place] COLON CA SCRN NOT  [address] [facility] N/A 5/9/2018    COLONOSCOPY performed by Miguel Ángel Burch MD at [address] [place] [address]     No family history on file.   Social History     Socioeconomic History    Marital status:      Spouse name: Not on file    Number of children: Not on file    Years of education: Not on file    Highest education level: Not on file   Occupational History    Not on file   Social Needs    Financial resource strain: Not on file    Food insecurity:     Worry: Not on file     Inability: Not on file    Transportation needs:     Medical: Not on file     Non-medical: Not on file   Tobacco Use    Smoking status: Never Smoker    Smokeless tobacco: Never Used   Substance and Sexual Activity    Alcohol use: Yes     Comment: rarely    Drug use: No    Sexual activity: Not on file   Lifestyle    Physical activity:     Days per week: Not on file     Minutes per session: Not on file    Stress: Not on file   Relationships    Social connections:     Talks on phone: Not on file     Gets together: Not on file     Attends [employer]

## 2019-09-19 ASSESSMENT — ENCOUNTER SYMPTOMS
APNEA: 0
PHOTOPHOBIA: 0
CHOKING: 0
FACIAL SWELLING: 0
BLOOD IN STOOL: 0
RHINORRHEA: 1
ABDOMINAL DISTENTION: 0

## 2019-10-24 ENCOUNTER — OFFICE VISIT (OUTPATIENT)
Dept: FAMILY MEDICINE CLINIC | Age: 69
End: 2019-10-24
Payer: MEDICARE

## 2019-10-24 VITALS
OXYGEN SATURATION: 96 % | HEART RATE: 72 BPM | HEIGHT: 62 IN | DIASTOLIC BLOOD PRESSURE: 74 MMHG | TEMPERATURE: 96.6 F | RESPIRATION RATE: 18 BRPM | SYSTOLIC BLOOD PRESSURE: 132 MMHG | WEIGHT: 184 LBS | BODY MASS INDEX: 33.86 KG/M2

## 2019-10-24 DIAGNOSIS — J01.90 ACUTE BACTERIAL SINUSITIS: Primary | ICD-10-CM

## 2019-10-24 DIAGNOSIS — B96.89 ACUTE BACTERIAL SINUSITIS: Primary | ICD-10-CM

## 2019-10-24 PROCEDURE — 99213 OFFICE O/P EST LOW 20 MIN: CPT | Performed by: NURSE PRACTITIONER

## 2019-10-24 PROCEDURE — 3017F COLORECTAL CA SCREEN DOC REV: CPT | Performed by: NURSE PRACTITIONER

## 2019-10-24 PROCEDURE — 1123F ACP DISCUSS/DSCN MKR DOCD: CPT | Performed by: NURSE PRACTITIONER

## 2019-10-24 PROCEDURE — G8417 CALC BMI ABV UP PARAM F/U: HCPCS | Performed by: NURSE PRACTITIONER

## 2019-10-24 PROCEDURE — 1090F PRES/ABSN URINE INCON ASSESS: CPT | Performed by: NURSE PRACTITIONER

## 2019-10-24 PROCEDURE — G8482 FLU IMMUNIZE ORDER/ADMIN: HCPCS | Performed by: NURSE PRACTITIONER

## 2019-10-24 PROCEDURE — 1036F TOBACCO NON-USER: CPT | Performed by: NURSE PRACTITIONER

## 2019-10-24 PROCEDURE — G8399 PT W/DXA RESULTS DOCUMENT: HCPCS | Performed by: NURSE PRACTITIONER

## 2019-10-24 PROCEDURE — 4040F PNEUMOC VAC/ADMIN/RCVD: CPT | Performed by: NURSE PRACTITIONER

## 2019-10-24 PROCEDURE — G8427 DOCREV CUR MEDS BY ELIG CLIN: HCPCS | Performed by: NURSE PRACTITIONER

## 2019-10-24 RX ORDER — AMOXICILLIN AND CLAVULANATE POTASSIUM 875; 125 MG/1; MG/1
1 TABLET, FILM COATED ORAL 2 TIMES DAILY
Qty: 20 TABLET | Refills: 0 | Status: SHIPPED | OUTPATIENT
Start: 2019-10-24 | End: 2019-11-03

## 2019-10-24 RX ORDER — DEXTROMETHORPHAN HYDROBROMIDE AND PROMETHAZINE HYDROCHLORIDE 15; 6.25 MG/5ML; MG/5ML
5 SYRUP ORAL 4 TIMES DAILY PRN
Qty: 150 ML | Refills: 0 | Status: SHIPPED | OUTPATIENT
Start: 2019-10-24 | End: 2019-10-31

## 2019-10-24 ASSESSMENT — ENCOUNTER SYMPTOMS
RHINORRHEA: 1
SWOLLEN GLANDS: 1
NAUSEA: 0
ANAL BLEEDING: 0
HOARSE VOICE: 0
ABDOMINAL DISTENTION: 0
SORE THROAT: 1
WHEEZING: 0
SINUS PRESSURE: 1
CHEST TIGHTNESS: 0
SHORTNESS OF BREATH: 0
ABDOMINAL PAIN: 0
DIARRHEA: 0
VOMITING: 0
COUGH: 1
CONSTIPATION: 0
BLOOD IN STOOL: 0

## 2019-11-13 ENCOUNTER — TELEPHONE (OUTPATIENT)
Dept: FAMILY MEDICINE CLINIC | Age: 69
End: 2019-11-13

## 2019-11-13 RX ORDER — CLINDAMYCIN HYDROCHLORIDE 300 MG/1
300 CAPSULE ORAL 3 TIMES DAILY
Qty: 30 CAPSULE | Refills: 0 | Status: SHIPPED | OUTPATIENT
Start: 2019-11-13 | End: 2019-11-23

## 2019-11-13 RX ORDER — FLUCONAZOLE 150 MG/1
150 TABLET ORAL ONCE
Qty: 2 TABLET | Refills: 0 | Status: SHIPPED | OUTPATIENT
Start: 2019-11-13 | End: 2019-11-18 | Stop reason: SDUPTHER

## 2019-11-18 ENCOUNTER — OFFICE VISIT (OUTPATIENT)
Dept: PRIMARY CARE CLINIC | Age: 69
End: 2019-11-18
Payer: MEDICARE

## 2019-11-18 VITALS
WEIGHT: 186 LBS | DIASTOLIC BLOOD PRESSURE: 70 MMHG | TEMPERATURE: 98.1 F | HEIGHT: 62 IN | BODY MASS INDEX: 34.23 KG/M2 | SYSTOLIC BLOOD PRESSURE: 134 MMHG | OXYGEN SATURATION: 93 % | RESPIRATION RATE: 14 BRPM | HEART RATE: 75 BPM

## 2019-11-18 DIAGNOSIS — Z00.00 HEALTH CARE MAINTENANCE: Primary | ICD-10-CM

## 2019-11-18 DIAGNOSIS — B37.9 YEAST INFECTION: ICD-10-CM

## 2019-11-18 LAB
BILIRUBIN, POC: NORMAL
BLOOD URINE, POC: NORMAL
CLARITY, POC: CLEAR
COLOR, POC: YELLOW
GLUCOSE URINE, POC: NORMAL
HBA1C MFR BLD: 6.1 %
KETONES, POC: NORMAL
LEUKOCYTE EST, POC: NORMAL
NITRITE, POC: NORMAL
PH, POC: NORMAL
PROTEIN, POC: NORMAL
SPECIFIC GRAVITY, POC: 1.02
UROBILINOGEN, POC: NORMAL

## 2019-11-18 PROCEDURE — 4040F PNEUMOC VAC/ADMIN/RCVD: CPT | Performed by: INTERNAL MEDICINE

## 2019-11-18 PROCEDURE — G8399 PT W/DXA RESULTS DOCUMENT: HCPCS | Performed by: INTERNAL MEDICINE

## 2019-11-18 PROCEDURE — G8482 FLU IMMUNIZE ORDER/ADMIN: HCPCS | Performed by: INTERNAL MEDICINE

## 2019-11-18 PROCEDURE — G8427 DOCREV CUR MEDS BY ELIG CLIN: HCPCS | Performed by: INTERNAL MEDICINE

## 2019-11-18 PROCEDURE — 3017F COLORECTAL CA SCREEN DOC REV: CPT | Performed by: INTERNAL MEDICINE

## 2019-11-18 PROCEDURE — 81002 URINALYSIS NONAUTO W/O SCOPE: CPT | Performed by: INTERNAL MEDICINE

## 2019-11-18 PROCEDURE — 1123F ACP DISCUSS/DSCN MKR DOCD: CPT | Performed by: INTERNAL MEDICINE

## 2019-11-18 PROCEDURE — 1036F TOBACCO NON-USER: CPT | Performed by: INTERNAL MEDICINE

## 2019-11-18 PROCEDURE — 1090F PRES/ABSN URINE INCON ASSESS: CPT | Performed by: INTERNAL MEDICINE

## 2019-11-18 PROCEDURE — 99213 OFFICE O/P EST LOW 20 MIN: CPT | Performed by: INTERNAL MEDICINE

## 2019-11-18 PROCEDURE — 83036 HEMOGLOBIN GLYCOSYLATED A1C: CPT | Performed by: INTERNAL MEDICINE

## 2019-11-18 PROCEDURE — G8417 CALC BMI ABV UP PARAM F/U: HCPCS | Performed by: INTERNAL MEDICINE

## 2019-11-18 RX ORDER — FLUCONAZOLE 150 MG/1
150 TABLET ORAL ONCE
Qty: 2 TABLET | Refills: 1 | Status: SHIPPED | OUTPATIENT
Start: 2019-11-18 | End: 2019-11-18

## 2019-11-24 ASSESSMENT — ENCOUNTER SYMPTOMS
ABDOMINAL DISTENTION: 0
CHOKING: 0
BLOOD IN STOOL: 0
APNEA: 0
FACIAL SWELLING: 0
PHOTOPHOBIA: 0

## 2020-08-12 ENCOUNTER — TELEPHONE (OUTPATIENT)
Dept: PRIMARY CARE CLINIC | Age: 70
End: 2020-08-12

## 2020-09-17 ENCOUNTER — OFFICE VISIT (OUTPATIENT)
Dept: PRIMARY CARE CLINIC | Age: 70
End: 2020-09-17
Payer: MEDICARE

## 2020-09-17 VITALS
SYSTOLIC BLOOD PRESSURE: 110 MMHG | BODY MASS INDEX: 34.04 KG/M2 | HEIGHT: 62 IN | DIASTOLIC BLOOD PRESSURE: 70 MMHG | WEIGHT: 185 LBS | OXYGEN SATURATION: 98 % | RESPIRATION RATE: 14 BRPM | HEART RATE: 74 BPM

## 2020-09-17 DIAGNOSIS — Z00.00 PREVENTATIVE HEALTH CARE: ICD-10-CM

## 2020-09-17 DIAGNOSIS — E78.00 PURE HYPERCHOLESTEROLEMIA: ICD-10-CM

## 2020-09-17 DIAGNOSIS — E03.9 HYPOTHYROIDISM, UNSPECIFIED TYPE: ICD-10-CM

## 2020-09-17 DIAGNOSIS — R73.9 HYPERGLYCEMIA: ICD-10-CM

## 2020-09-17 LAB
ALBUMIN SERPL-MCNC: 4.2 G/DL (ref 3.5–4.6)
ALP BLD-CCNC: 87 U/L (ref 40–130)
ALT SERPL-CCNC: 26 U/L (ref 0–33)
ANION GAP SERPL CALCULATED.3IONS-SCNC: 13 MEQ/L (ref 9–15)
AST SERPL-CCNC: 27 U/L (ref 0–35)
BASOPHILS ABSOLUTE: 0 K/UL (ref 0–0.2)
BASOPHILS RELATIVE PERCENT: 0.4 %
BILIRUB SERPL-MCNC: 0.3 MG/DL (ref 0.2–0.7)
BUN BLDV-MCNC: 19 MG/DL (ref 8–23)
CALCIUM SERPL-MCNC: 9.3 MG/DL (ref 8.5–9.9)
CHLORIDE BLD-SCNC: 100 MEQ/L (ref 95–107)
CHOLESTEROL, TOTAL: 183 MG/DL (ref 0–199)
CO2: 27 MEQ/L (ref 20–31)
CREAT SERPL-MCNC: 0.41 MG/DL (ref 0.5–0.9)
EOSINOPHILS ABSOLUTE: 0.1 K/UL (ref 0–0.7)
EOSINOPHILS RELATIVE PERCENT: 1.3 %
GFR AFRICAN AMERICAN: >60
GFR NON-AFRICAN AMERICAN: >60
GLOBULIN: 3.3 G/DL (ref 2.3–3.5)
GLUCOSE BLD-MCNC: 97 MG/DL (ref 70–99)
HCT VFR BLD CALC: 42.5 % (ref 37–47)
HDLC SERPL-MCNC: 44 MG/DL (ref 40–59)
HEMOGLOBIN: 14 G/DL (ref 12–16)
LDL CHOLESTEROL CALCULATED: 104 MG/DL (ref 0–129)
LYMPHOCYTES ABSOLUTE: 2.3 K/UL (ref 1–4.8)
LYMPHOCYTES RELATIVE PERCENT: 34.9 %
MCH RBC QN AUTO: 28.3 PG (ref 27–31.3)
MCHC RBC AUTO-ENTMCNC: 32.9 % (ref 33–37)
MCV RBC AUTO: 86.1 FL (ref 82–100)
MONOCYTES ABSOLUTE: 0.6 K/UL (ref 0.2–0.8)
MONOCYTES RELATIVE PERCENT: 8.4 %
NEUTROPHILS ABSOLUTE: 3.6 K/UL (ref 1.4–6.5)
NEUTROPHILS RELATIVE PERCENT: 55 %
PDW BLD-RTO: 13.8 % (ref 11.5–14.5)
PLATELET # BLD: 335 K/UL (ref 130–400)
POTASSIUM SERPL-SCNC: 4.7 MEQ/L (ref 3.4–4.9)
RBC # BLD: 4.94 M/UL (ref 4.2–5.4)
SODIUM BLD-SCNC: 140 MEQ/L (ref 135–144)
TOTAL PROTEIN: 7.5 G/DL (ref 6.3–8)
TRIGL SERPL-MCNC: 175 MG/DL (ref 0–150)
TSH REFLEX: 2.11 UIU/ML (ref 0.44–3.86)
WBC # BLD: 6.6 K/UL (ref 4.8–10.8)

## 2020-09-17 PROCEDURE — G0439 PPPS, SUBSEQ VISIT: HCPCS | Performed by: INTERNAL MEDICINE

## 2020-09-17 PROCEDURE — 3017F COLORECTAL CA SCREEN DOC REV: CPT | Performed by: INTERNAL MEDICINE

## 2020-09-17 PROCEDURE — 4040F PNEUMOC VAC/ADMIN/RCVD: CPT | Performed by: INTERNAL MEDICINE

## 2020-09-17 PROCEDURE — 1123F ACP DISCUSS/DSCN MKR DOCD: CPT | Performed by: INTERNAL MEDICINE

## 2020-09-17 RX ORDER — MOMETASONE FUROATE 1 MG/G
CREAM TOPICAL
Qty: 50 G | Refills: 11 | Status: SHIPPED | OUTPATIENT
Start: 2020-09-17

## 2020-09-17 RX ORDER — SIMVASTATIN 10 MG
10 TABLET ORAL NIGHTLY
Status: ON HOLD | COMMUNITY
End: 2021-06-29

## 2020-09-17 RX ORDER — AMLODIPINE BESYLATE 2.5 MG/1
2.5 TABLET ORAL DAILY
Qty: 90 TABLET | Refills: 3 | Status: ON HOLD | OUTPATIENT
Start: 2020-09-17 | End: 2021-07-01 | Stop reason: HOSPADM

## 2020-09-17 RX ORDER — SIMVASTATIN 10 MG
TABLET ORAL
Qty: 90 TABLET | Refills: 3 | Status: SHIPPED | OUTPATIENT
Start: 2020-09-17 | End: 2021-09-20 | Stop reason: SDUPTHER

## 2020-09-17 SDOH — ECONOMIC STABILITY: FOOD INSECURITY: WITHIN THE PAST 12 MONTHS, YOU WORRIED THAT YOUR FOOD WOULD RUN OUT BEFORE YOU GOT MONEY TO BUY MORE.: NEVER TRUE

## 2020-09-17 SDOH — ECONOMIC STABILITY: FOOD INSECURITY: WITHIN THE PAST 12 MONTHS, THE FOOD YOU BOUGHT JUST DIDN'T LAST AND YOU DIDN'T HAVE MONEY TO GET MORE.: NEVER TRUE

## 2020-09-17 SDOH — ECONOMIC STABILITY: INCOME INSECURITY: HOW HARD IS IT FOR YOU TO PAY FOR THE VERY BASICS LIKE FOOD, HOUSING, MEDICAL CARE, AND HEATING?: NOT HARD AT ALL

## 2020-09-17 ASSESSMENT — PATIENT HEALTH QUESTIONNAIRE - PHQ9
SUM OF ALL RESPONSES TO PHQ QUESTIONS 1-9: 0
SUM OF ALL RESPONSES TO PHQ9 QUESTIONS 1 & 2: 0
SUM OF ALL RESPONSES TO PHQ QUESTIONS 1-9: 0
1. LITTLE INTEREST OR PLEASURE IN DOING THINGS: 0
2. FEELING DOWN, DEPRESSED OR HOPELESS: 0

## 2020-09-17 ASSESSMENT — ENCOUNTER SYMPTOMS
PHOTOPHOBIA: 0
APNEA: 0
FACIAL SWELLING: 0
ABDOMINAL DISTENTION: 0
BLOOD IN STOOL: 0
CHOKING: 0

## 2020-09-17 ASSESSMENT — LIFESTYLE VARIABLES
AUDIT TOTAL SCORE: 0
HOW OFTEN DURING THE LAST YEAR HAVE YOU FOUND THAT YOU WERE NOT ABLE TO STOP DRINKING ONCE YOU HAD STARTED: NEVER
HOW OFTEN DURING THE LAST YEAR HAVE YOU FAILED TO DO WHAT WAS NORMALLY EXPECTED FROM YOU BECAUSE OF DRINKING: 0
HOW OFTEN DURING THE LAST YEAR HAVE YOU BEEN UNABLE TO REMEMBER WHAT HAPPENED THE NIGHT BEFORE BECAUSE YOU HAD BEEN DRINKING: NEVER
HAS A RELATIVE, FRIEND, DOCTOR, OR ANOTHER HEALTH PROFESSIONAL EXPRESSED CONCERN ABOUT YOUR DRINKING OR SUGGESTED YOU CUT DOWN: NO
HOW OFTEN DURING THE LAST YEAR HAVE YOU HAD A FEELING OF GUILT OR REMORSE AFTER DRINKING: 0
HOW OFTEN DURING THE LAST YEAR HAVE YOU NEEDED AN ALCOHOLIC DRINK FIRST THING IN THE MORNING TO GET YOURSELF GOING AFTER A NIGHT OF HEAVY DRINKING: 0
HOW OFTEN DURING THE LAST YEAR HAVE YOU BEEN UNABLE TO REMEMBER WHAT HAPPENED THE NIGHT BEFORE BECAUSE YOU HAD BEEN DRINKING: 0
HOW OFTEN DURING THE LAST YEAR HAVE YOU FAILED TO DO WHAT WAS NORMALLY EXPECTED FROM YOU BECAUSE OF DRINKING: NEVER
HOW MANY STANDARD DRINKS CONTAINING ALCOHOL DO YOU HAVE ON A TYPICAL DAY: 0
HOW MANY STANDARD DRINKS CONTAINING ALCOHOL DO YOU HAVE ON A TYPICAL DAY: ONE OR TWO
HAVE YOU OR SOMEONE ELSE BEEN INJURED AS A RESULT OF YOUR DRINKING: NO
HOW OFTEN DO YOU HAVE A DRINK CONTAINING ALCOHOL: 1
HAVE YOU OR SOMEONE ELSE BEEN INJURED AS A RESULT OF YOUR DRINKING: 0
HOW OFTEN DO YOU HAVE SIX OR MORE DRINKS ON ONE OCCASION: NEVER
HOW OFTEN DURING THE LAST YEAR HAVE YOU FOUND THAT YOU WERE NOT ABLE TO STOP DRINKING ONCE YOU HAD STARTED: 0
HOW OFTEN DO YOU HAVE SIX OR MORE DRINKS ON ONE OCCASION: 0
HOW OFTEN DO YOU HAVE A DRINK CONTAINING ALCOHOL: MONTHLY OR LESS
AUDIT TOTAL SCORE: 1
AUDIT-C TOTAL SCORE: 1
HAS A RELATIVE, FRIEND, DOCTOR, OR ANOTHER HEALTH PROFESSIONAL EXPRESSED CONCERN ABOUT YOUR DRINKING OR SUGGESTED YOU CUT DOWN: 0
AUDIT-C TOTAL SCORE: 0
HOW OFTEN DURING THE LAST YEAR HAVE YOU HAD A FEELING OF GUILT OR REMORSE AFTER DRINKING: NEVER
HOW OFTEN DURING THE LAST YEAR HAVE YOU NEEDED AN ALCOHOLIC DRINK FIRST THING IN THE MORNING TO GET YOURSELF GOING AFTER A NIGHT OF HEAVY DRINKING: NEVER

## 2020-09-17 NOTE — PROGRESS NOTES
Substance and Sexual Activity    Alcohol use: Yes     Comment: rarely    Drug use: No    Sexual activity: None   Lifestyle    Physical activity     Days per week: None     Minutes per session: None    Stress: None   Relationships    Social connections     Talks on phone: None     Gets together: None     Attends Sikh service: None     Active member of club or organization: None     Attends meetings of clubs or organizations: None     Relationship status: None    Intimate partner violence     Fear of current or ex partner: None     Emotionally abused: None     Physically abused: None     Forced sexual activity: None   Other Topics Concern    None   Social History Narrative    None     Allergies   Allergen Reactions    Novocain [Procaine]     Shrimp (Diagnostic)      swelling       Review of Systems   Constitutional: Negative for chills and fever. HENT: Negative for congestion and facial swelling. Eyes: Negative for photophobia and visual disturbance. Respiratory: Negative for apnea and choking. Cardiovascular: Negative for chest pain and palpitations. Gastrointestinal: Negative for abdominal distention and blood in stool. Genitourinary: Negative for dysuria and enuresis. Musculoskeletal: Negative for gait problem and joint swelling. Skin: Negative for rash. Neurological: Negative for syncope and speech difficulty. Hematological: Does not bruise/bleed easily. Psychiatric/Behavioral: Negative for hallucinations and suicidal ideas. Vitals:    09/17/20 1045   BP: 110/70   Pulse: 74   Resp: 14   SpO2: 98%   Weight: 185 lb (83.9 kg)   Height: 5' 2\" (1.575 m)       Physical Exam  Constitutional:       Appearance: She is well-developed. HENT:      Head: Normocephalic and atraumatic. Mouth/Throat:      Mouth: Mucous membranes are moist.   Eyes:      Pupils: Pupils are equal, round, and reactive to light.    Neck:      Musculoskeletal: Normal range of motion and neck supple. Cardiovascular:      Rate and Rhythm: Normal rate and regular rhythm. Heart sounds: Normal heart sounds. Pulmonary:      Effort: No respiratory distress. Breath sounds: Normal breath sounds. No wheezing. Abdominal:      General: Bowel sounds are normal.      Palpations: Abdomen is soft. Musculoskeletal: Normal range of motion. Skin:     Coloration: Skin is not jaundiced. Neurological:      Mental Status: She is alert and oriented to person, place, and time. Cranial Nerves: No cranial nerve deficit. Psychiatric:         Mood and Affect: Mood normal.          Assessment/Plan  Lenora was seen today for medicare awv. Diagnoses and all orders for this visit:    Preventative health care  -     Comprehensive Metabolic Panel; Future  -     CBC With Auto Differential; Future  -     TSH with Reflex; Future  -     Lipid Panel; Future    Essential hypertension  -     amLODIPine (NORVASC) 2.5 MG tablet; Take 1 tablet by mouth daily    Pure hypercholesterolemia  -     simvastatin (ZOCOR) 10 MG tablet; TAKE ONE TABLET BY MOUTH ONCE DAILY IN THE EVENING  -     Comprehensive Metabolic Panel; Future  -     Lipid Panel; Future    Eczema, unspecified type  -     mometasone (ELOCON) 0.1 % cream; Apply topically daily. Hyperglycemia  -     Comprehensive Metabolic Panel; Future    Hypothyroidism, unspecified type  -     TSH with Reflex; Future    Routine general medical examination at a health care facility        Return in 1 year (on 2021) for Medicare Annual Wellness Visit in 1 year. Robert Montez MD  Medicare Annual Wellness Visit  Name: Fanny Viveros Date: 2020   MRN: [id number] Sex: Female   Age: 71 y.o. Ethnicity: Non-/Non    : 1950 Race: Ed Ayse is here for Medicare AWV    Screenings for behavioral, psychosocial and functional/safety risks, and cognitive dysfunction are all negative except as indicated below.  These results, as well as other patient data from the [address] form, are documented in Flowsheets linked to this Encounter. Allergies   Allergen Reactions    Novocain [Procaine]     Shrimp (Diagnostic)      swelling         Prior to Visit Medications    Medication Sig Taking? Authorizing Provider   amLODIPine (NORVASC) 2.5 MG tablet Take 1 tablet by mouth daily Yes Robert Montez MD   simvastatin (ZOCOR) 10 MG tablet TAKE ONE TABLET BY MOUTH ONCE DAILY IN THE EVENING Yes Robert Montez MD   VITAMIN E PO Take by mouth Yes Historical Provider, MD   Ascorbic Acid (VITAMIN C PO) Take by mouth Yes Historical Provider, MD   simvastatin (ZOCOR) 10 MG tablet Take 10 mg by mouth nightly Yes Historical Provider, MD   mometasone (ELOCON) 0.1 % cream Apply topically daily. Yes Robert Montez MD         Past Medical History:   Diagnosis Date    Acid reflux     Chronic back pain     Hyperlipidemia     Hypertension     Osteoarthritis     shots in knees by ortho       Past Surgical History:   Procedure Laterality Date    APPENDECTOMY  1959    BREAST SURGERY      HYSTERECTOMY  1992    [place] COLON CA SCRN NOT  [address] [facility] N/A 5/9/2018    COLONOSCOPY performed by Mildred Curtis MD at [address] [place] [address]       No family history on file. CareTeam (Including outside providers/suppliers regularly involved in providing care):   Patient Care Team:  Robert Montez MD as PCP - General (Internal Medicine)  Robert Montez MD [facility] P[facility]    Wt Readings from Last 3 Encounters:   09/17/20 185 lb (83.9 kg)   11/18/19 186 lb (84.4 kg)   10/24/19 184 lb (83.5 kg)     Vitals:    09/17/20 1045   BP: 110/70   Pulse: 74   Resp: 14   SpO2: 98%   Weight: 185 lb (83.9 kg)   Height: 5' 2\" (1.575 m)     Body mass index is 33.84 kg/m². Based upon direct observation of the patient, evaluation of cognition reveals recent and remote memory intact.       Patient's complete Health Risk Assessment and screening values have been reviewed and are found in [address] [facility]. The following problems were reviewed today and where indicated follow up appointments were made and/or referrals ordered. Positive Risk Factor Screenings with Interventions:     General Health:  General  In general, how would you say your health is?: Very Good  In the past 7 days, have you experienced any of the following? New or Increased Pain, New or Increased Fatigue, Loneliness, Social Isolation, Stress or Anger?: (!) Stress, Anger  Do you get the social and emotional support that you need?: Yes  Do you have a Living Will?: Yes  General Health Risk Interventions:  · discussed    Health Habits/Nutrition:  Health Habits/Nutrition  Do you exercise for at least 20 minutes 2-3 times per week?: Yes  Have you lost any weight without trying in the past 3 months?: No  Do you eat fewer than 2 meals per day?: No  Have you seen a dentist within the past year?: (!) No  Body mass index is 33.84 kg/m².   Health Habits/Nutrition Interventions:  · discussed    Safety:  Safety  Do you have working smoke detectors?: Yes  Have all throw rugs been removed or fastened?: (!) No  Do you have non-slip mats or surfaces in all bathtubs/showers?: Yes  Do all of your stairways have a railing or banister?: Yes  Are your doorways, halls and stairs free of clutter?: Yes  Do you always fasten your seatbelt when you are in a car?: Yes  Safety Interventions:  · discussed    Personalized Preventive Plan   Current Health Maintenance Status  Immunization History   Administered Date(s) Administered    Influenza Vaccine, unspecified formulation 10/31/2016    Influenza, High Dose (Fluzone 65 yrs and older) 10/31/2016, 11/03/2017, 10/25/2018, 09/12/2019    Pneumococcal Conjugate 13-valent ([id number]) 04/18/2017    Pneumococcal Polysaccharide ([id number]) 04/18/2018        Health Maintenance   Topic Date Due    DTaP/Tdap/Td vaccine (1 - Tdap) 12/05/1969    Shingles Vaccine (1 of 2) 12/05/2000    Annual Wellness Visit (AWV)  05/29/2019    Breast cancer screen  07/27/2020    Flu vaccine (1) 09/01/2020    A1C test (Diabetic or Prediabetic)  11/18/2020    Lipid screen  09/17/2021    Colon cancer screen colonoscopy  05/09/2023    DEXA (modify frequency per FRAX score)  Completed    Pneumococcal 65+ years Vaccine  Completed    Hepatitis C screen  Completed    Hepatitis A vaccine  Aged Out    Hepatitis B vaccine  Aged Out    Hib vaccine  Aged Out    Meningococcal (ACWY) vaccine  Aged Out     Recommendations for Your Dollar Matters Due: see orders and patient instructions/AVS.  . Recommended screening schedule for the next 5-10 years is provided to the patient in written form: see Patient Douglas Mittal was seen today for medicare awv. Diagnoses and all orders for this visit:    Preventative health care  -     Comprehensive Metabolic Panel; Future  -     CBC With Auto Differential; Future  -     TSH with Reflex; Future  -     Lipid Panel; Future    Essential hypertension  -     amLODIPine (NORVASC) 2.5 MG tablet; Take 1 tablet by mouth daily    Pure hypercholesterolemia  -     simvastatin (ZOCOR) 10 MG tablet; TAKE ONE TABLET BY MOUTH ONCE DAILY IN THE EVENING  -     Comprehensive Metabolic Panel; Future  -     Lipid Panel; Future    Eczema, unspecified type  -     mometasone (ELOCON) 0.1 % cream; Apply topically daily. Hyperglycemia  -     Comprehensive Metabolic Panel; Future    Hypothyroidism, unspecified type  -     TSH with Reflex;  Future    Routine general medical examination at a health care facility

## 2020-12-03 ENCOUNTER — TELEPHONE (OUTPATIENT)
Dept: PRIMARY CARE CLINIC | Age: 70
End: 2020-12-03

## 2020-12-03 ENCOUNTER — VIRTUAL VISIT (OUTPATIENT)
Dept: PRIMARY CARE CLINIC | Age: 70
End: 2020-12-03
Payer: MEDICARE

## 2020-12-03 PROCEDURE — 99441 PR PHYS/QHP TELEPHONE EVALUATION 5-10 MIN: CPT | Performed by: NURSE PRACTITIONER

## 2020-12-03 RX ORDER — FLUTICASONE PROPIONATE 50 MCG
2 SPRAY, SUSPENSION (ML) NASAL DAILY
Qty: 1 BOTTLE | Refills: 0 | Status: ON HOLD | OUTPATIENT
Start: 2020-12-03 | End: 2021-06-29

## 2020-12-03 ASSESSMENT — ENCOUNTER SYMPTOMS
SHORTNESS OF BREATH: 0
SORE THROAT: 0
ABDOMINAL DISTENTION: 0
WHEEZING: 0
SINUS PAIN: 0
TROUBLE SWALLOWING: 0
SINUS PRESSURE: 0
VOMITING: 0
COUGH: 0
EYE ITCHING: 0
NAUSEA: 0
APNEA: 0
DIARRHEA: 0
CONSTIPATION: 0
RHINORRHEA: 1
EYE REDNESS: 0

## 2020-12-03 NOTE — PROGRESS NOTES
12/3/2020   Pt and provider completed a telephone visit today. Pt was at her home and provider was in her office. TELEHEALTH EVALUATION -- Audio/Visual (During CUHSX-80 public health emergency)    HPI:  Pt presents via the telephone today  c/o clear nasal drg x yesterday but reports seasonal allergies. Pt was out shopping this past weekend and concerned for Covid. Pt was ordered a test today if she chooses to come up. Pt denies any SOB, chest pain, diarrhea, N/V, rash, loss of sense of smell/taste, dizzy, weakness, trouble breathing/swallowing or s/s of distress. Phil Veloz (:  1950) has requested an audio/video evaluation for the following concern(s):    Chief Complaint   Patient presents with    Concern For COVID-19     pt has clear nasal drg x yesterday concerned with high risk bracket    Sinusitis     runny nose, clear nasal drg x yesterday , reports seasonal allergies         Review of Systems   Constitutional: Negative for activity change, appetite change, chills, fatigue and fever. HENT: Positive for postnasal drip and rhinorrhea. Negative for congestion, drooling, ear pain, hearing loss, sinus pressure, sinus pain, sore throat and trouble swallowing. Eyes: Negative for redness, itching and visual disturbance. Respiratory: Negative for apnea, cough, shortness of breath and wheezing. Cardiovascular: Negative for chest pain. Gastrointestinal: Negative for abdominal distention, constipation, diarrhea, nausea and vomiting. Endocrine: Negative for heat intolerance. Genitourinary: Negative for difficulty urinating and dysuria. Musculoskeletal: Negative for gait problem, myalgias and neck stiffness. Skin: Negative for rash. Neurological: Negative for tremors, seizures, facial asymmetry and headaches. Hematological: Negative for adenopathy. Psychiatric/Behavioral: Negative for confusion. All other systems reviewed and are negative.       Prior to Visit Medications    Medication Sig Taking? Authorizing Provider   fluticasone (FLONASE) 50 MCG/ACT nasal spray 2 sprays by Nasal route daily for 14 days Yes ILIA Gomez - CNP   amLODIPine (NORVASC) 2.5 MG tablet Take 1 tablet by mouth daily  Lang Orozco MD   simvastatin (ZOCOR) 10 MG tablet TAKE ONE TABLET BY MOUTH ONCE DAILY IN THE [place] MD Rony   VITAMIN E PO Take by mouth  Historical Provider, MD   Ascorbic Acid (VITAMIN C PO) Take by mouth  Historical Provider, MD   simvastatin (ZOCOR) 10 MG tablet Take 10 mg by mouth nightly  Historical Provider, MD   mometasone (ELOCON) 0.1 % cream Apply topically daily.   Lang Orozco MD       Social History     Tobacco Use    Smoking status: Never Smoker    Smokeless tobacco: Never Used   Substance Use Topics    Alcohol use: Yes     Comment: rarely    Drug use: No        Allergies   Allergen Reactions    Novocain [Procaine]     Shrimp (Diagnostic)      swelling   ,   Past Medical History:   Diagnosis Date    Acid reflux     Chronic back pain     Hyperlipidemia     Hypertension     Osteoarthritis     shots in knees by ortho   ,   Past Surgical History:   Procedure Laterality Date    APPENDECTOMY  1959    BREAST SURGERY      HYSTERECTOMY  1992    [place] COLON CA SCRN NOT  [address] IND N/A 5/9/2018    COLONOSCOPY performed by Greer Laura MD at [address] [facility]   ,   Social History     Tobacco Use    Smoking status: Never Smoker    Smokeless tobacco: Never Used   Substance Use Topics    Alcohol use: Yes     Comment: rarely    Drug use: No   , No family history on file.,   Immunization History   Administered Date(s) Administered    Influenza Vaccine, unspecified formulation 10/31/2016    Influenza, High Dose (Fluzone 65 yrs and older) 10/31/2016, 11/03/2017, 10/25/2018, 09/12/2019    Pneumococcal Conjugate 13-valent ([id number]) 04/18/2017    Pneumococcal Polysaccharide ([id number]) 04/18/2018       PHYSICAL EXAMINATION:  [ INSTRUCTIONS:  \"[x]\" Indicates a positive item  \"[]\" Indicates a negative item  -- DELETE ALL ITEMS NOT EXAMINED]  Vital Signs: (As obtained by patient/caregiver or practitioner observation)    Blood pressure-  Heart rate-    Respiratory rate-    Temperature-  Pulse oximetry-                      Pt was unable to provide     Mental status  [x] Alert and awake  [x] Oriented to person/place/time [x]Able to follow commands      Pt denies any s/s of acute distress but her kids made her call and see if she needs to get tested. Covid test ordered today if pt chooses to come up. ASSESSMENT/PLAN:  1. Seasonal allergies    - fluticasone (FLONASE) 50 MCG/ACT nasal spray; 2 sprays by Nasal route daily for 14 days  Dispense: 1 Bottle; Refill: 0    2. Encounter for laboratory testing for COVID-19 virus    - COVID-19 Ambulatory; Future    Pt was seen today and her kids wanted her to call and be seen and possible tested for Covid. Pt has a Hx of allergies and prescribed Flonase today and advised how to use this. Pt was advised if she comes up to get Covid tested she needs to self quarantine until her test resut comes back. PT verbalized understanding. Return if symptoms worsen or fail to improve. Alejandro Alvares is a 71 y.o. female being evaluated by a Virtual Visit (video visit) encounter to address concerns as mentioned above. A caregiver was present when appropriate. Due to this being a TeleHealth encounter ([facility]), evaluation of the following organ systems was limited: Vitals/Constitutional/EENT/Resp/CV/GI//MS/Neuro/Skin/Heme-Lymph-Imm.   Pursuant to the emergency declaration under the [facility], [address] [place] [address] authority and the [employer] Act, this Virtual Visit was conducted with patient's (and/or legal guardian's) consent, to reduce the patient's risk of exposure to COVID-19 and provide necessary medical care. The patient (and/or legal guardian) has also been advised to contact this office for worsening conditions or problems, and seek emergency medical treatment and/or call 911 if deemed necessary. Patient identification was verified at the start of the visit: Yes    Total time spent on this encounter: 10    Services were provided through a video synchronous discussion virtually to substitute for in-person clinic visit. Patient and provider were located at their individual homes. --ILIA Noel CNP on 12/3/2020 at 1:14 PM    An electronic signature was used to authenticate this note.

## 2020-12-03 NOTE — TELEPHONE ENCOUNTER
She is asking for a handicap placard for her arthritis? Ok to give for 5 years? Her # is [phone].

## 2020-12-03 NOTE — PATIENT INSTRUCTIONS
Patient Education        Allergies: Care Instructions  Your Care Instructions     Allergies occur when your body's defense system (immune system) overreacts to certain substances. The immune system treats a harmless substance as if it were a harmful germ or virus. Many things can make this happen. These include pollens, medicine, food, dust, animal dander, and mold. Allergies can be mild or severe. Mild allergies can be managed with home treatment. But medicine may be needed to prevent problems. Managing your allergies is an important part of staying healthy. Your doctor may suggest that you have allergy testing to help find out what is causing your allergies. Severe allergies can cause reactions that affect your whole body (anaphylactic reactions). Your doctor may prescribe a shot of epinephrine to carry with you in case you have a severe reaction. Learn how to give yourself the shot and keep it with you at all times. Make sure it is not . Follow-up care is a key part of your treatment and safety. Be sure to make and go to all appointments, and call your doctor if you are having problems. It's also a good idea to know your test results and keep a list of the medicines you take. How can you care for yourself at home? · If you have been told by your doctor that dust or dust mites are causing your allergy, decrease the dust around your bed:  ? Wash sheets, pillowcases, and other bedding in hot water every week. ? Use dust-proof covers for pillows, duvets, and mattresses. Avoid plastic covers because they tear easily and do not \"breathe. \" Wash as instructed on the label. ? Do not use any blankets and pillows that you do not need. ? Use blankets that you can wash in your washing machine. ? Consider removing drapes and carpets, which attract and hold dust, from your bedroom. · If you are allergic to house dust and mites, do not use home humidifiers.  Your doctor can suggest ways you can control dust and mites. · Look for signs of cockroaches. Cockroaches cause allergic reactions. Use cockroach baits to get rid of them. Then, clean your home well. Cockroaches like areas where grocery bags, newspapers, empty bottles, or cardboard boxes are stored. Do not keep these inside your home, and keep trash and food containers sealed. Seal off any spots where cockroaches might enter your home. · If you are allergic to mold, get rid of furniture, rugs, and drapes that smell musty. Check for mold in the bathroom. · If you are allergic to outdoor pollen or mold spores, use air-conditioning. Change or clean all filters every month. Keep windows closed. · If you are allergic to pollen, stay inside when pollen counts are high. Use a vacuum  with a HEPA filter or a double-thickness filter at least two times each week. · Stay inside when air pollution is bad. Avoid paint fumes, perfumes, and other strong odors. · Avoid conditions that make your allergies worse. Stay away from smoke. Do not smoke or let anyone else smoke in your house. Do not use fireplaces or wood-burning stoves. · If you are allergic to your pets, change the air filter in your furnace every month. Use high-efficiency filters. · If you are allergic to pet dander, keep pets outside or out of your bedroom. Old carpet and cloth furniture can hold a lot of animal dander. You may need to replace them. When should you call for help? Give an epinephrine shot if:    · You think you are having a severe allergic reaction.     · You have symptoms in more than one body area, such as mild nausea and an itchy mouth. After giving an epinephrine shot call 911, even if you feel better. Call 911 if:    · You have symptoms of a severe allergic reaction. These may include:  ? Sudden raised, red areas (hives) all over your body. ? Swelling of the throat, mouth, lips, or tongue. ? Trouble breathing. ? Passing out (losing consciousness).  Or you may feel very lightheaded or suddenly feel weak, confused, or restless.     · You have been given an epinephrine shot, even if you feel better. Call your doctor now or seek immediate medical care if:    · You have symptoms of an allergic reaction, such as:  ? A rash or hives (raised, red areas on the skin). ? Itching. ? Swelling. ? Belly pain, nausea, or vomiting. Watch closely for changes in your health, and be sure to contact your doctor if:    · You do not get better as expected. Where can you learn more? Go to [email or web address] [email or web address] org and sign in to your [employer] account. Enter J435 in the Find That File box to learn more about \"Allergies: Care Instructions. \"     If you do not have an account, please click on the \"Sign Up Now\" link. Current as of: June 29, 2020               Content Version: 12.6  © 1283-0947 [employer]. Care instructions adapted under license by [facility] ([facility]). If you have questions about a medical condition or this instruction, always ask your healthcare professional. [place] [address] any warranty or liability for your use of this information. Patient Education        Seasonal Allergies: Care Instructions  Your Care Instructions     Allergies occur when your body's defense system (immune system) overreacts to certain substances. The immune system treats a harmless substance as if it were a harmful germ or virus. Many things can cause this to happen. Examples include pollens, medicine, food, dust, animal dander, and mold. Your allergies are seasonal if you have symptoms just at certain times of the year. In that case, you are probably allergic to pollens from certain trees, grasses, or weeds. Allergies can be mild or severe. Over-the-counter allergy medicine may help with some symptoms. Read and follow all instructions on the label. Managing your allergies is an important part of staying healthy.  Your doctor may suggest that you have tests to help find the cause of your allergies. When you know what things trigger your symptoms, you can avoid them. This can prevent allergy symptoms and other health problems. In some cases, immunotherapy might help. For this treatment, you get shots or use pills that have a small amount of certain allergens in them. Your body \"gets used to\" the allergen, so you react less to it over time. This kind of treatment may help prevent or reduce some allergy symptoms. Follow-up care is a key part of your treatment and safety. Be sure to make and go to all appointments, and call your doctor if you are having problems. It's also a good idea to know your test results and keep a list of the medicines you take. How can you care for yourself at home? · Be safe with medicines. Take your medicines exactly as prescribed. Call your doctor if you think you are having a problem with your medicine. · During your allergy season, keep windows closed. If you need to use air-conditioning, change or clean all filters every month. Take a shower and change your clothes after you have been outside. · Stay inside when pollen counts are high. Vacuum once or twice a week. Use a vacuum  with a HEPA filter or a double-thickness filter. When should you call for help? Give an epinephrine shot if:    · You think you are having a severe allergic reaction. After giving an epinephrine shot, call 911, even if you feel better. Call 911 if:    · You have symptoms of a severe allergic reaction. These may include:  ? Sudden raised, red areas (hives) all over your body. ? Swelling of the throat, mouth, lips, or tongue. ? Trouble breathing. ? Passing out (losing consciousness). Or you may feel very lightheaded or suddenly feel weak, confused, or restless.     · You have been given an epinephrine shot, even if you feel better.    Call your doctor now or seek immediate medical care if:    · You have symptoms of an allergic reaction, such as:  ? A rash or hives (raised, red areas on the skin). ? Itching. ? Swelling. ? Belly pain, nausea, or vomiting. Watch closely for changes in your health, and be sure to contact your doctor if:    · You do not get better as expected. Where can you learn more? Go to [email or web address] and sign in to your [email or web address] account. Enter J912 in the Zoomph box to learn more about \"Seasonal Allergies: Care Instructions. \"     If you do not have an account, please click on the \"Sign Up Now\" link. Current as of: June 29, 2020               Content Version: 12.6  © 2006-2020 [employer]. Care instructions adapted under license by [facility] ([facility]). If you have questions about a medical condition or this instruction, always ask your healthcare professional. [address] any warranty or liability for your use of this information. Patient Education        Learning About Coronavirus [phone])  Coronavirus [phone]): Overview  What is coronavirus (FKEBV-29)? The coronavirus disease (COVID-19) is caused by a virus. It is an illness that was first found in December 2019. It has since spread worldwide. The virus can cause fever, cough, and trouble breathing. In severe cases, it can cause pneumonia and make it hard to breathe without help. It can cause death. This virus spreads person-to-person through droplets from coughing and sneezing. It can also spread when you are close to someone who is infected. And it can spread when you touch something that has the virus on it, such as a doorknob or a tabletop. Coronaviruses are a large group of viruses. They cause the common cold. They also cause more serious illnesses like Middle East respiratory syndrome (MERS) and severe acute respiratory syndrome (SARS). COVID-19 is caused by a novel coronavirus. That means it's a new type that has not been seen in people before. How is COVID-19 treated?   Mild illness can be treated at home, but more serious illness needs to be treated in the hospital. Treatment may include medicines to reduce symptoms, plus breathing support such as oxygen therapy or a ventilator. Other treatments, such as antiviral medicines, may help people who have COVID-19. What can you do to protect yourself from COVID-19? The best way to protect yourself from getting sick is to:  · Avoid areas where there is an outbreak. · Avoid contact with people who may be infected. · Avoid crowds and try to stay at least 6 feet away from other people. · Wash your hands often, especially after you cough or sneeze. Use soap and water, and scrub for at least 20 seconds. If soap and water aren't available, use an alcohol-based hand . · Avoid touching your mouth, nose, and eyes. What can you do to avoid spreading the virus to others? To help avoid spreading the virus to others:  · Wash your hands often with soap or alcohol-based hand sanitizers. · Cover your mouth with a tissue when you cough or sneeze. Then throw the tissue in the trash. · Use a disinfectant to clean things that you touch often. These include doorknobs, remote controls, phones, and handles on your refrigerator and microwave. And don't forget countertops, tabletops, bathrooms, and computer keyboards. · Wear a cloth face cover if you have to go to public areas. If you know or suspect that you have COVID-19:  · Stay home. Don't go to school, work, or public areas. And don't use public transportation, ride-shares, or taxis unless you have no choice. · Leave your home only if you need to get medical care or testing. But call the doctor's office first so they know you're coming. And wear a face cover. · Limit contact with people in your home. If possible, stay in a separate bedroom and use a separate bathroom. · Wear a face cover whenever you're around other people.  It can help stop the spread of the virus when you cough or sneeze. · Clean and disinfect your home every day. Use household  and disinfectant wipes or sprays. Take special care to clean things that you grab with your hands. · Self-isolate until it's safe to be around others again. ? If you have symptoms, it's safe when you haven't had a fever for 3 days and your symptoms have improved and it's been at least 10 days since your symptoms started. ? If you were exposed to the virus but don't have symptoms, it's safe to be around others 14 days after exposure. ? Talk to your doctor about whether you also need testing, especially if you have a weakened immune system. When to call for help  Call 911 anytime you think you may need emergency care. For example, call if:  · You have severe trouble breathing. (You can't talk at all.)  · You have constant chest pain or pressure. · You are severely dizzy or lightheaded. · You are confused or can't think clearly. · Your face and lips have a blue color. · You passed out (lost consciousness) or are very hard to wake up. Call your doctor now if you develop symptoms such as:  · Shortness of breath. · Fever. · Cough. If you need to get care, call ahead to the doctor's office for instructions before you go. Make sure you wear a face cover to prevent exposing other people to the virus. Where can you get the latest information? The following health organizations are tracking and studying this virus. Their websites contain the most up-to-date information. Maci Donna also learn what to do if you think you may have been exposed to the virus. · U.S. Centers for Disease Control and Prevention (CDC): The CDC provides updated news about the disease and travel advice. The website also tells you how to prevent the spread of infection. www.cdc.gov  · World Health Organization [facility]): WHO offers information about the virus outbreaks.  WHO also has travel advice. www.who.int  Current as of: July 10, 2020               Content Version: 12.6  © 2006-2020 Healthwise, Incorporated. Care instructions adapted under license by [facility] ([facility]). If you have questions about a medical condition or this instruction, always ask your healthcare professional. [address] any warranty or liability for your use of this information. Patient Education        Coronavirus (PJVIV-22): Care Instructions  Overview  The coronavirus disease (COVID-19) is caused by a virus. Symptoms may include a fever, a cough, and shortness of breath. It mainly spreads person-to-person through droplets from coughing and sneezing. The virus also can spread when people are in close contact with someone who is infected. Most people have mild symptoms and can take care of themselves at home. If their symptoms get worse, they may need care in a hospital. Treatment may include medicines to reduce symptoms, plus breathing support such as oxygen therapy or a ventilator. It's important to not spread the virus to others. If you have COVID-19, wear a face cover anytime you are around other people. You need to isolate yourself while you are sick. Leave your home only if you need to get medical care or testing. Follow-up care is a key part of your treatment and safety. Be sure to make and go to all appointments, and call your doctor if you are having problems. It's also a good idea to know your test results and keep a list of the medicines you take. How can you care for yourself at home? · Get extra rest. It can help you feel better. · Drink plenty of fluids. This helps replace fluids lost from fever. Fluids also help ease a scratchy throat. Water, soup, fruit juice, and hot tea with lemon are good choices. · Take acetaminophen (such as Tylenol) to reduce a fever. It may also help with muscle aches. Read and follow all instructions on the label. · Use petroleum jelly on sore skin.  This can help if the skin around your nose and lips becomes sore from rubbing a lot · You are severely dizzy or lightheaded.     · You are confused or can't think clearly.     · Your face and lips have a blue color.     · You pass out (lose consciousness) or are very hard to wake up. Call your doctor now or seek immediate medical care if:    · You have moderate trouble breathing. (You can't speak a full sentence.)     · You are coughing up blood (more than about 1 teaspoon).     · You have signs of low blood pressure. These include feeling lightheaded; being too weak to stand; and having cold, pale, clammy skin. Watch closely for changes in your health, and be sure to contact your doctor if:    · Your symptoms get worse.     · You are not getting better as expected. Call before you go to the doctor's office. Follow their instructions. And wear a cloth face cover. Current as of: July 10, 2020               Content Version: 12.6  © 2006-2020 [employer]. Care instructions adapted under license by [facility] [address] If you have questions about a medical condition or this instruction, always ask your healthcare professional. [address] any warranty or liability for your use of this information.

## 2021-06-24 ENCOUNTER — VIRTUAL VISIT (OUTPATIENT)
Dept: PRIMARY CARE CLINIC | Age: 71
End: 2021-06-24
Payer: MEDICARE

## 2021-06-24 DIAGNOSIS — J06.9 URTI (ACUTE UPPER RESPIRATORY INFECTION): ICD-10-CM

## 2021-06-24 DIAGNOSIS — Z12.31 ENCOUNTER FOR SCREENING MAMMOGRAM FOR BREAST CANCER: Primary | ICD-10-CM

## 2021-06-24 DIAGNOSIS — Z00.00 PREVENTATIVE HEALTH CARE: ICD-10-CM

## 2021-06-24 LAB
INFLUENZA A ANTIBODY: NEGATIVE
INFLUENZA B ANTIBODY: NEGATIVE

## 2021-06-24 PROCEDURE — 87804 INFLUENZA ASSAY W/OPTIC: CPT | Performed by: INTERNAL MEDICINE

## 2021-06-24 PROCEDURE — 99442 PR PHYS/QHP TELEPHONE EVALUATION 11-20 MIN: CPT | Performed by: INTERNAL MEDICINE

## 2021-06-24 ASSESSMENT — PATIENT HEALTH QUESTIONNAIRE - PHQ9
2. FEELING DOWN, DEPRESSED OR HOPELESS: 0
SUM OF ALL RESPONSES TO PHQ QUESTIONS 1-9: 0
1. LITTLE INTEREST OR PLEASURE IN DOING THINGS: 0
SUM OF ALL RESPONSES TO PHQ9 QUESTIONS 1 & 2: 0
SUM OF ALL RESPONSES TO PHQ QUESTIONS 1-9: 0
SUM OF ALL RESPONSES TO PHQ QUESTIONS 1-9: 0

## 2021-06-24 NOTE — PROGRESS NOTES
Cecil Starr is a 79 y.o. female evaluated via telephone on 6/24/2021. Consent:  She and/or health care decision maker is aware that that she may receive a bill for this telephone service, depending on her insurance coverage, and has provided verbal consent to proceed: Yes    Documentation:  I communicated with the patient and/or health care decision maker. Details of this discussion including any medical advice provided:    Pt presents with new diagnosis of COVID today at [facility]. She had 5 days of headache and cough productive of greyish phlegm. Mild exertional SOB, no wheezing, no fever but mild chills. Mild sinus congestion (hs seasonal allergies), no sore throat. Assoc generalized body pain and fatigue. No known sick or COVID contacts. No NVDC, assoc poor appetite, no abd pain. Mild help from Nyquil and Dayquil. Pulse ox unknown. Pt attests to feeling unsteady on her feet. Daughter on the phone as well. Daughter claims she and her family have remained in close proximity with pt. As such they have been advised to isolate together. Daughter sounded displeased with having to isolate her whole family(though she claims no one has symptoms). But I advised it's better now than to prolong the illness. Alternatively have everyone tested and if negative, no need for isolation. Daughter and pt requests repeat PCR test in pt. Pt advised to remain isolated: For at least 10 days after onset of symptoms  At least 24 hours after fever resolution  Improvement in symptoms. She will have another person provide her with a pulse oximeter. If pulse ox is less than 95% or she gets  more SOB or fatigued, go to ER ASAP. Otherwise, rest, fluid and APAP prn. Will DC isolation, if COVID negative. Pt did not sound in painful or resp distress.      I affirm this is a Patient Initiated Episode with a Patient who has not had a related appointment within my department in the past 7 days or scheduled within the next 24 hours. Patient identification was verified at the start of the visit: Yes    Total Time: minutes: 11-20 minutes    The visit was conducted pursuant to the emergency declaration under the [address] [place] [address] and the [employer] General Act. Patient identification was verified, and a caregiver was present when appropriate. The patient was located in a state where the provider was credentialed to provide care.     Note: not billable if this call serves to triage the patient into an appointment for the relevant concern      Olga Lidia Taylor MD

## 2021-06-25 ENCOUNTER — TELEPHONE (OUTPATIENT)
Dept: PRIMARY CARE CLINIC | Age: 71
End: 2021-06-25

## 2021-06-25 LAB — SARS-COV-2, PCR: DETECTED

## 2021-06-25 NOTE — TELEPHONE ENCOUNTER
Pt was called today to advise her of her covid results but pt's mailbox was full and was unable to leave a message. Will try again later to call pt.

## 2021-06-28 ENCOUNTER — TELEPHONE (OUTPATIENT)
Dept: PRIMARY CARE CLINIC | Age: 71
End: 2021-06-28

## 2021-06-28 NOTE — TELEPHONE ENCOUNTER
Patient's daughter Shakira Mcmahan states patient was @ [facility] ER Thursday and d/c Friday. She is positive for covid. Shakira Mcmahan states that she was discharged without any instructions. Shakira Mcmahan took her home with her, but she is quarantining in the basement. Patient is very fatigued, stubborn, and acting different than normal. She also fell while using the restroom (patient is ok, did not go to ER, per Shakira Mcmahan). She refuses to wear a mask when her daughter is around, so patient is concerned with how to help her and states that she needs care. Patient does have a virtual hospital follow up with Dr. Jerrod Acosta tomorrow. Please advise.

## 2021-06-29 ENCOUNTER — TELEPHONE (OUTPATIENT)
Dept: PRIMARY CARE CLINIC | Age: 71
End: 2021-06-29

## 2021-06-29 ENCOUNTER — APPOINTMENT (OUTPATIENT)
Dept: CT IMAGING | Age: 71
DRG: 177 | End: 2021-06-29
Payer: MEDICARE

## 2021-06-29 ENCOUNTER — HOSPITAL ENCOUNTER (INPATIENT)
Age: 71
LOS: 2 days | Discharge: HOME | DRG: 177 | End: 2021-07-01
Attending: EMERGENCY MEDICINE | Admitting: INTERNAL MEDICINE
Payer: MEDICARE

## 2021-06-29 ENCOUNTER — APPOINTMENT (OUTPATIENT)
Dept: GENERAL RADIOLOGY | Age: 71
DRG: 177 | End: 2021-06-29
Payer: MEDICARE

## 2021-06-29 DIAGNOSIS — U07.1 PNEUMONIA DUE TO COVID-19 VIRUS: ICD-10-CM

## 2021-06-29 DIAGNOSIS — R50.9 FEVER, UNSPECIFIED FEVER CAUSE: Primary | ICD-10-CM

## 2021-06-29 DIAGNOSIS — R09.02 HYPOXIA: ICD-10-CM

## 2021-06-29 DIAGNOSIS — J12.82 PNEUMONIA DUE TO COVID-19 VIRUS: ICD-10-CM

## 2021-06-29 DIAGNOSIS — S09.90XA CLOSED HEAD INJURY, INITIAL ENCOUNTER: ICD-10-CM

## 2021-06-29 LAB
ALBUMIN SERPL-MCNC: 3.9 G/DL (ref 3.5–4.6)
ALP BLD-CCNC: 79 U/L (ref 40–130)
ALT SERPL-CCNC: 31 U/L (ref 0–33)
ANION GAP SERPL CALCULATED.3IONS-SCNC: 10 MEQ/L (ref 9–15)
AST SERPL-CCNC: 24 U/L (ref 0–35)
BASOPHILS ABSOLUTE: 0 K/UL (ref 0–0.2)
BASOPHILS RELATIVE PERCENT: 0.2 %
BILIRUB SERPL-MCNC: 0.3 MG/DL (ref 0.2–0.7)
BILIRUBIN URINE: NEGATIVE
BLOOD, URINE: NEGATIVE
BUN BLDV-MCNC: 16 MG/DL (ref 8–23)
CALCIUM SERPL-MCNC: 9 MG/DL (ref 8.5–9.9)
CHLORIDE BLD-SCNC: 96 MEQ/L (ref 95–107)
CLARITY: CLEAR
CO2: 30 MEQ/L (ref 20–31)
COLOR: YELLOW
CREAT SERPL-MCNC: 0.77 MG/DL (ref 0.5–0.9)
EOSINOPHILS ABSOLUTE: 0 K/UL (ref 0–0.7)
EOSINOPHILS RELATIVE PERCENT: 0.1 %
GFR AFRICAN AMERICAN: >60
GFR NON-AFRICAN AMERICAN: >60
GLOBULIN: 3.1 G/DL (ref 2.3–3.5)
GLUCOSE BLD-MCNC: 129 MG/DL (ref 70–99)
GLUCOSE URINE: NEGATIVE MG/DL
HCT VFR BLD CALC: 41.3 % (ref 37–47)
HEMOGLOBIN: 13.8 G/DL (ref 12–16)
KETONES, URINE: NEGATIVE MG/DL
LEUKOCYTE ESTERASE, URINE: NEGATIVE
LYMPHOCYTES ABSOLUTE: 1.4 K/UL (ref 1–4.8)
LYMPHOCYTES RELATIVE PERCENT: 20.5 %
MAGNESIUM: 2 MG/DL (ref 1.7–2.4)
MCH RBC QN AUTO: 27.8 PG (ref 27–31.3)
MCHC RBC AUTO-ENTMCNC: 33.4 % (ref 33–37)
MCV RBC AUTO: 83.1 FL (ref 82–100)
MONOCYTES ABSOLUTE: 0.7 K/UL (ref 0.2–0.8)
MONOCYTES RELATIVE PERCENT: 9.9 %
NEUTROPHILS ABSOLUTE: 4.7 K/UL (ref 1.4–6.5)
NEUTROPHILS RELATIVE PERCENT: 69.3 %
NITRITE, URINE: NEGATIVE
PDW BLD-RTO: 13.5 % (ref 11.5–14.5)
PH UA: 5.5 (ref 5–9)
PLATELET # BLD: 283 K/UL (ref 130–400)
POTASSIUM SERPL-SCNC: 4.2 MEQ/L (ref 3.4–4.9)
PROCALCITONIN: 0.11 NG/ML (ref 0–0.15)
PROTEIN UA: ABNORMAL MG/DL
RBC # BLD: 4.97 M/UL (ref 4.2–5.4)
SARS-COV-2, NAAT: DETECTED
SODIUM BLD-SCNC: 136 MEQ/L (ref 135–144)
SPECIFIC GRAVITY UA: 1.01 (ref 1–1.03)
TOTAL PROTEIN: 7 G/DL (ref 6.3–8)
TROPONIN: <0.01 NG/ML (ref 0–0.01)
UROBILINOGEN, URINE: 0.2 E.U./DL
WBC # BLD: 6.8 K/UL (ref 4.8–10.8)

## 2021-06-29 PROCEDURE — 36415 COLL VENOUS BLD VENIPUNCTURE: CPT

## 2021-06-29 PROCEDURE — 6370000000 HC RX 637 (ALT 250 FOR IP): Performed by: INTERNAL MEDICINE

## 2021-06-29 PROCEDURE — 99223 1ST HOSP IP/OBS HIGH 75: CPT | Performed by: INTERNAL MEDICINE

## 2021-06-29 PROCEDURE — 2580000003 HC RX 258: Performed by: EMERGENCY MEDICINE

## 2021-06-29 PROCEDURE — 84145 PROCALCITONIN (PCT): CPT

## 2021-06-29 PROCEDURE — 6370000000 HC RX 637 (ALT 250 FOR IP): Performed by: EMERGENCY MEDICINE

## 2021-06-29 PROCEDURE — 6830039000 HC L3 TRAUMA ALERT

## 2021-06-29 PROCEDURE — 80053 COMPREHEN METABOLIC PANEL: CPT

## 2021-06-29 PROCEDURE — 6360000002 HC RX W HCPCS: Performed by: EMERGENCY MEDICINE

## 2021-06-29 PROCEDURE — 85025 COMPLETE CBC W/AUTO DIFF WBC: CPT

## 2021-06-29 PROCEDURE — 1210000000 HC MED SURG R&B

## 2021-06-29 PROCEDURE — 84484 ASSAY OF TROPONIN QUANT: CPT

## 2021-06-29 PROCEDURE — 2700000000 HC OXYGEN THERAPY PER DAY

## 2021-06-29 PROCEDURE — 94667 MNPJ CHEST WALL 1ST: CPT

## 2021-06-29 PROCEDURE — 96374 THER/PROPH/DIAG INJ IV PUSH: CPT

## 2021-06-29 PROCEDURE — 83735 ASSAY OF MAGNESIUM: CPT

## 2021-06-29 PROCEDURE — 6370000000 HC RX 637 (ALT 250 FOR IP): Performed by: NURSE PRACTITIONER

## 2021-06-29 PROCEDURE — XW033E5 INTRODUCTION OF REMDESIVIR ANTI-INFECTIVE INTO PERIPHERAL VEIN, PERCUTANEOUS APPROACH, NEW TECHNOLOGY GROUP 5: ICD-10-PCS | Performed by: INTERNAL MEDICINE

## 2021-06-29 PROCEDURE — 99285 EMERGENCY DEPT VISIT HI MDM: CPT

## 2021-06-29 PROCEDURE — 94761 N-INVAS EAR/PLS OXIMETRY MLT: CPT

## 2021-06-29 PROCEDURE — 87635 SARS-COV-2 COVID-19 AMP PRB: CPT

## 2021-06-29 PROCEDURE — 71045 X-RAY EXAM CHEST 1 VIEW: CPT

## 2021-06-29 PROCEDURE — 6360000002 HC RX W HCPCS: Performed by: NURSE PRACTITIONER

## 2021-06-29 PROCEDURE — 2500000003 HC RX 250 WO HCPCS: Performed by: NURSE PRACTITIONER

## 2021-06-29 PROCEDURE — 93005 ELECTROCARDIOGRAM TRACING: CPT | Performed by: EMERGENCY MEDICINE

## 2021-06-29 PROCEDURE — 81003 URINALYSIS AUTO W/O SCOPE: CPT

## 2021-06-29 PROCEDURE — 70450 CT HEAD/BRAIN W/O DYE: CPT

## 2021-06-29 PROCEDURE — 2580000003 HC RX 258: Performed by: NURSE PRACTITIONER

## 2021-06-29 PROCEDURE — 96375 TX/PRO/DX INJ NEW DRUG ADDON: CPT

## 2021-06-29 RX ORDER — ONDANSETRON 2 MG/ML
4 INJECTION INTRAMUSCULAR; INTRAVENOUS ONCE
Status: COMPLETED | OUTPATIENT
Start: 2021-06-29 | End: 2021-06-29

## 2021-06-29 RX ORDER — ONDANSETRON 4 MG/1
4 TABLET, ORALLY DISINTEGRATING ORAL EVERY 8 HOURS PRN
Status: DISCONTINUED | OUTPATIENT
Start: 2021-06-29 | End: 2021-07-01 | Stop reason: HOSPADM

## 2021-06-29 RX ORDER — DEXAMETHASONE SODIUM PHOSPHATE 10 MG/ML
6 INJECTION INTRAMUSCULAR; INTRAVENOUS ONCE
Status: COMPLETED | OUTPATIENT
Start: 2021-06-29 | End: 2021-06-29

## 2021-06-29 RX ORDER — ONDANSETRON 2 MG/ML
4 INJECTION INTRAMUSCULAR; INTRAVENOUS EVERY 6 HOURS PRN
Status: DISCONTINUED | OUTPATIENT
Start: 2021-06-29 | End: 2021-07-01 | Stop reason: HOSPADM

## 2021-06-29 RX ORDER — SODIUM CHLORIDE 9 MG/ML
25 INJECTION, SOLUTION INTRAVENOUS PRN
Status: DISCONTINUED | OUTPATIENT
Start: 2021-06-29 | End: 2021-07-01 | Stop reason: HOSPADM

## 2021-06-29 RX ORDER — BACITRACIN, NEOMYCIN, POLYMYXIN B 400; 3.5; 5 [USP'U]/G; MG/G; [USP'U]/G
OINTMENT TOPICAL 2 TIMES DAILY
Status: DISCONTINUED | OUTPATIENT
Start: 2021-06-29 | End: 2021-07-01 | Stop reason: HOSPADM

## 2021-06-29 RX ORDER — MORPHINE SULFATE 2 MG/ML
4 INJECTION, SOLUTION INTRAMUSCULAR; INTRAVENOUS ONCE
Status: COMPLETED | OUTPATIENT
Start: 2021-06-29 | End: 2021-06-29

## 2021-06-29 RX ORDER — VITAMIN B COMPLEX
6000 TABLET ORAL DAILY
Status: DISCONTINUED | OUTPATIENT
Start: 2021-06-29 | End: 2021-07-01 | Stop reason: HOSPADM

## 2021-06-29 RX ORDER — VITAMIN B COMPLEX
2000 TABLET ORAL DAILY
Status: DISCONTINUED | OUTPATIENT
Start: 2021-07-06 | End: 2021-07-01 | Stop reason: HOSPADM

## 2021-06-29 RX ORDER — SODIUM CHLORIDE 0.9 % (FLUSH) 0.9 %
5-40 SYRINGE (ML) INJECTION EVERY 12 HOURS SCHEDULED
Status: DISCONTINUED | OUTPATIENT
Start: 2021-06-29 | End: 2021-07-01 | Stop reason: HOSPADM

## 2021-06-29 RX ORDER — POLYETHYLENE GLYCOL 3350 17 G/17G
17 POWDER, FOR SOLUTION ORAL DAILY PRN
Status: DISCONTINUED | OUTPATIENT
Start: 2021-06-29 | End: 2021-07-01 | Stop reason: HOSPADM

## 2021-06-29 RX ORDER — ACETAMINOPHEN 500 MG
1000 TABLET ORAL ONCE
Status: COMPLETED | OUTPATIENT
Start: 2021-06-29 | End: 2021-06-29

## 2021-06-29 RX ORDER — GUAIFENESIN/DEXTROMETHORPHAN 100-10MG/5
5 SYRUP ORAL EVERY 4 HOURS PRN
Status: DISCONTINUED | OUTPATIENT
Start: 2021-06-29 | End: 2021-07-01 | Stop reason: HOSPADM

## 2021-06-29 RX ORDER — ACETAMINOPHEN 650 MG/1
650 SUPPOSITORY RECTAL EVERY 6 HOURS PRN
Status: DISCONTINUED | OUTPATIENT
Start: 2021-06-29 | End: 2021-07-01 | Stop reason: HOSPADM

## 2021-06-29 RX ORDER — SODIUM CHLORIDE 9 MG/ML
INJECTION, SOLUTION INTRAVENOUS CONTINUOUS
Status: DISPENSED | OUTPATIENT
Start: 2021-06-29 | End: 2021-06-30

## 2021-06-29 RX ORDER — SODIUM CHLORIDE 0.9 % (FLUSH) 0.9 %
5-40 SYRINGE (ML) INJECTION PRN
Status: DISCONTINUED | OUTPATIENT
Start: 2021-06-29 | End: 2021-07-01 | Stop reason: HOSPADM

## 2021-06-29 RX ORDER — FAMOTIDINE 20 MG/1
20 TABLET, FILM COATED ORAL 2 TIMES DAILY
Status: DISCONTINUED | OUTPATIENT
Start: 2021-06-29 | End: 2021-07-01 | Stop reason: HOSPADM

## 2021-06-29 RX ORDER — 0.9 % SODIUM CHLORIDE 0.9 %
1000 INTRAVENOUS SOLUTION INTRAVENOUS ONCE
Status: COMPLETED | OUTPATIENT
Start: 2021-06-29 | End: 2021-06-29

## 2021-06-29 RX ORDER — DEXAMETHASONE 6 MG/1
6 TABLET ORAL DAILY
Status: DISCONTINUED | OUTPATIENT
Start: 2021-06-29 | End: 2021-07-01 | Stop reason: HOSPADM

## 2021-06-29 RX ORDER — ACETAMINOPHEN 325 MG/1
650 TABLET ORAL EVERY 6 HOURS PRN
Status: DISCONTINUED | OUTPATIENT
Start: 2021-06-29 | End: 2021-07-01 | Stop reason: HOSPADM

## 2021-06-29 RX ADMIN — Medication 6000 UNITS: at 15:26

## 2021-06-29 RX ADMIN — SODIUM CHLORIDE, PRESERVATIVE FREE 10 ML: 5 INJECTION INTRAVENOUS at 22:21

## 2021-06-29 RX ADMIN — SODIUM CHLORIDE 1000 ML: 9 INJECTION, SOLUTION INTRAVENOUS at 11:59

## 2021-06-29 RX ADMIN — ONDANSETRON 4 MG: 2 INJECTION INTRAMUSCULAR; INTRAVENOUS at 12:01

## 2021-06-29 RX ADMIN — ENOXAPARIN SODIUM 30 MG: 30 INJECTION SUBCUTANEOUS at 22:22

## 2021-06-29 RX ADMIN — BACITRACIN, NEOMYCIN, POLYMYXIN B 1 G: 400; 3.5; 5 OINTMENT TOPICAL at 15:26

## 2021-06-29 RX ADMIN — MORPHINE SULFATE 4 MG: 2 INJECTION, SOLUTION INTRAMUSCULAR; INTRAVENOUS at 12:02

## 2021-06-29 RX ADMIN — DEXAMETHASONE 6 MG: 6 TABLET ORAL at 15:27

## 2021-06-29 RX ADMIN — BACITRACIN, NEOMYCIN, POLYMYXIN B 1 G: 400; 3.5; 5 OINTMENT TOPICAL at 22:22

## 2021-06-29 RX ADMIN — ACETAMINOPHEN 1000 MG: 500 TABLET ORAL at 12:32

## 2021-06-29 RX ADMIN — REMDESIVIR 200 MG: 100 INJECTION, POWDER, LYOPHILIZED, FOR SOLUTION INTRAVENOUS at 15:25

## 2021-06-29 RX ADMIN — FAMOTIDINE 20 MG: 20 TABLET ORAL at 15:26

## 2021-06-29 RX ADMIN — FAMOTIDINE 20 MG: 20 TABLET ORAL at 22:21

## 2021-06-29 RX ADMIN — SODIUM CHLORIDE: 9 INJECTION, SOLUTION INTRAVENOUS at 15:25

## 2021-06-29 RX ADMIN — DEXAMETHASONE SODIUM PHOSPHATE 6 MG: 10 INJECTION INTRAMUSCULAR; INTRAVENOUS at 12:00

## 2021-06-29 ASSESSMENT — ENCOUNTER SYMPTOMS
NAUSEA: 1
COUGH: 1
SHORTNESS OF BREATH: 0
DIARRHEA: 0
ALLERGIC/IMMUNOLOGIC NEGATIVE: 1
VOMITING: 0
ABDOMINAL PAIN: 0
SORE THROAT: 0
COUGH: 0
EYES NEGATIVE: 1
BACK PAIN: 0
NAUSEA: 0

## 2021-06-29 ASSESSMENT — PAIN SCALES - GENERAL
PAINLEVEL_OUTOF10: 6
PAINLEVEL_OUTOF10: 0
PAINLEVEL_OUTOF10: 6
PAINLEVEL_OUTOF10: 6

## 2021-06-29 ASSESSMENT — PAIN DESCRIPTION - ORIENTATION: ORIENTATION: MID

## 2021-06-29 ASSESSMENT — PAIN DESCRIPTION - DESCRIPTORS: DESCRIPTORS: SHARP

## 2021-06-29 ASSESSMENT — PAIN DESCRIPTION - LOCATION: LOCATION: ABDOMEN

## 2021-06-29 ASSESSMENT — PAIN DESCRIPTION - PAIN TYPE: TYPE: ACUTE PAIN

## 2021-06-29 NOTE — CONSULTS
Pulmonary Medicine  Consult Note      Reason for consultation: COVID-19 infection, pneumonia versus atelectasis    Consulting physician: Dr. Barbara Matias:    This is 20-year-old female with past medical history significant for hypertension, hyperlipidemia, osteoarthritis, GERD was presented to ER with generalized weakness and fall at home. She reported having increased weakness over the past week and she had fall twice in last 3 days. Today she fell hitting the forehead causing small laceration and loss of consciousness. She has been diagnosed with Covid 19. She said she was having flulike symptoms and cough and chest congestion so she was tested 5days ago. .  She had low-grade fever with T-max 100.7. She said she was having diarrhea for couple of days when she was diagnosed with Covid but denies having any nausea vomiting or diarrhea at this time. She has some epigastric area discomfort. She had chest x-ray done which shows left basilar atelectasis versus infiltration. She is on dexamethasone 6 mg p.o. daily Lovenox 30 mg subcutaneous twice a day and started on remdesivir. She is currently on 2 L O2 via nasal cannula and O2 saturation is 96%. Past Medical History:        Diagnosis Date    Acid reflux     Chronic back pain     Hyperlipidemia     Hypertension     Osteoarthritis     shots in knees by ortho       Past Surgical History:        Procedure Laterality Date    APPENDECTOMY  1959    BREAST SURGERY      HYSTERECTOMY  1992    [place] COLON CA SCRN NOT  [address] [place] [address] N/A 5/9/2018    COLONOSCOPY performed by Lena Long MD at [address] [place] [address] History:     reports that she has never smoked. She has never used smokeless tobacco. She reports current alcohol use. She reports that she does not use drugs. Family History:   History reviewed. No pertinent family history.     Allergies:  Novocain [procaine] and Shrimp (diagnostic)        MEDICATIONS during current hospitalization:    Continuous Infusions:   sodium chloride      sodium chloride 100 mL/hr at 06/29/21 1525       Scheduled Meds:   dexamethasone  6 mg Oral Daily    sodium chloride flush  5-40 mL Intravenous 2 times per day    enoxaparin  30 mg Subcutaneous BID    Vitamin D  6,000 Units Oral Daily    Followed by   Damari Luo ON 7/6/2021] Vitamin D  2,000 Units Oral Daily    famotidine  20 mg Oral BID    neomycin-bacitracin-polymyxin   Topical BID    [START ON 6/30/2021] remdesivir IVPB  100 mg Intravenous Q24H       PRN Meds:sodium chloride flush, sodium chloride, ondansetron **OR** ondansetron, polyethylene glycol, acetaminophen **OR** acetaminophen, guaiFENesin-dextromethorphan    REVIEW OF SYSTEMS:  As in history of present illness  Other 14 point review of system is negative. PHYSICAL EXAM:    Vitals:  BP (!) 105/48   Pulse 75   Temp 100.7 °F (38.2 °C) (Oral)   Resp 21   Ht 5' 2\" (1.575 m)   Wt 170 lb (77.1 kg)   SpO2 96%   BMI 31.09 kg/m²   General: Alert, awake, Oriented x3  .comfortable in bed, No distress. Head: Atraumatic , Normocephalic   Eyes: PERRL. No sclera icterus. No conjunctival injection. No discharge   ENT: No nasal  discharge. Pharynx clear. Neck:  Trachea midline. No thyromegaly, no JVD, No cervical adenopathy. Chest : Bilaterally symmetrical ,Normal effort,  No accessory muscle use  Lung : Diminished BS bilateraly. No Rales. No wheezing. No rhonchi. Heart[de-identified] Normal  rate. Regular rhythm. No mumur ,  Rub or gallop  ABD: Non-tender. Non-distended. No masses. No organmegaly. Normal bowel sounds. No hernia. Musculoskeleton: normal range of motion in all extremites, strength and tone Extremities: No pitting in both lower leg , No Cyanosis ,No clubbing  Neuro: no cranial nerve abnormality, normal reflex and sensation, no focal weakness   Skin: Warm and dry. No erythema rash on exposed extremities.         Data Review  Recent Labs     06/29/21  1100   WBC 6.8   HGB 13.8 HCT 41.3         Recent Labs     06/29/21  1100      K 4.2   CL 96   CO2 30   BUN 16   CREATININE 0.77   GLUCOSE 129*       MV Settings: ABGs: No results for input(s): PHART, CDF5JYG, PO2ART, ZAP6BDZ, BEART, Z9DMUEOZ, RAB8FDX in the last 72 hours. O2 Device: Nasal cannula  O2 Flow Rate (L/min): 2 L/min  No results found for: LACTA    Radiology  CT Head WO Contrast    Result Date: 6/29/2021  EXAMINATION: CT of the brain without contrast HISTORY: Pain after a fall COMPARISON: None available TECHNIQUE: Multiple contiguous axial images were obtained of the brain from the skull base through the vertex. Multiplanar reformats were obtained. FINDINGS: Brain volume is age appropriate. Ventricular morphology is within normal limits. Gray-white matter differentiation is preserved. No acute hemorrhage or abnormal extra-axial fluid collection. No mass effect or midline shift. Mild paranasal sinus mucosal thickening. The mastoid air cells are clear. Calvarium is intact. No acute intracranial process. All CT scans at this facility use dose modulation, iterative reconstruction, and/or weight based dosing when appropriate to reduce radiation dose to as low as reasonably achievable. XR CHEST PORTABLE    Result Date: 6/29/2021  EXAMINATION: XR CHEST PORTABLE CLINICAL HISTORY: WEAKNESS, COUGH COMPARISONS: CHEST RADIOGRAPH APRIL 17, 2018 FINDINGS: Low lung volumes. Osseous structures intact. Cardiopericardial silhouette normal. Ill-defined area increased opacity left lung base. LEFT LOWER LUNG SUBSEGMENTAL ATELECTASIS/PNEUMONIA IN THIS PARTIALLY EXPIRATORY CHEST RADIOGRAPH. Assessment and  plan:     1. COVID-19 virus infection and possible pneumonia  2. Hypoxia secondary to above  3. Left lung base opacity pneumonia versus atelectasis  4. History of fall with closed head injury  5. Generalized weakness  6. Hypertension  7.  Hyperlipidemia    Patient is currently on 2 L O2 via nasal cannula O2 saturation 96%. She is started on dexamethasone 6 mg p.o. daily. Lovenox 30 mg twice a day. Also started on remdesivir 100 mg daily. .  If hypoxia worsen will consider CT chest.  Procalcitonin, D-dimer, ferritin, LDH, CRP pending. Continue DVT GI prophylaxis. Zofran as needed for nausea. Continue present treatment plan will follow    Thank you for consult    NOTE: This report was transcribed using voice recognition software. Every effort was made to ensure accuracy; however, inadvertent computerized transcription errors may be present.     Electronically signed by Lorena Day MD, [facility] on 6/29/2021 at 4:00 PM

## 2021-06-29 NOTE — PROGRESS NOTES
Patient assessment complete. Pt alert and orietned. Pt denies pain, shortness of breath. 95% on 2. No oxygen or assistive devices at home. Lungs diminished. Laceration to right forehead from fall at home. Scabbed over .

## 2021-06-29 NOTE — ED PROVIDER NOTES
[facility] ED  eMERGENCYdEPARTMENT eNCOUnter      Pt Name: Blayne Adams  MRN: [id number]  Sof[place]fkrystal 1950  Date of evaluation: 6/29/2021  Matt Walsh MD    CHIEF COMPLAINT           HPI  Blayne Adams is a 79 y.o. female per chart review has a h/o HTN, Hpl, GERD, low back pain, OA presents to the ED with weakness, fall, headache. Pt notes gradual onset, moderate, constant, diffuse weakness x 1 week. Pt notes prior to arrival she fell and hit her head due to weakness. Pt notes moderate, constant, aching, diffuse headache. Pt denies fever, n/v, cp, neck pain, ab pain, dysuria, diarrhea. ROS  Review of Systems   Constitutional: Negative for activity change, chills and fever. HENT: Negative for ear pain and sore throat. Eyes: Negative for visual disturbance. Respiratory: Negative for cough and shortness of breath. Cardiovascular: Negative for chest pain, palpitations and leg swelling. Gastrointestinal: Negative for abdominal pain, diarrhea, nausea and vomiting. Genitourinary: Negative for dysuria. Musculoskeletal: Negative for back pain. Skin: Negative for rash. Neurological: Positive for weakness and headaches. Negative for dizziness. Except as noted above the remainder of the review of systems was reviewed and negative.        PAST MEDICAL HISTORY     Past Medical History:   Diagnosis Date    Acid reflux     Chronic back pain     Hyperlipidemia     Hypertension     Osteoarthritis     shots in knees by ortho         SURGICAL HISTORY       Past Surgical History:   Procedure Laterality Date    APPENDECTOMY  1959    BREAST SURGERY      HYSTERECTOMY  1992    [place] COLON CA SCRN NOT  [address]  [place] N/A 5/9/2018    COLONOSCOPY performed by Greer Laura MD at [facility]       Previous Medications    AMLODIPINE (NORVASC) 2.5 MG TABLET    Take 1 tablet by mouth daily    ASCORBIC ACID (VITAMIN C PO)    Take by mouth FLUTICASONE (FLONASE) 50 MCG/ACT NASAL SPRAY    2 sprays by Nasal route daily for 14 days    MOMETASONE (ELOCON) 0.1 % CREAM    Apply topically daily. SIMVASTATIN (ZOCOR) 10 MG TABLET    TAKE ONE TABLET BY MOUTH ONCE DAILY IN THE EVENING    SIMVASTATIN (ZOCOR) 10 MG TABLET    Take 10 mg by mouth nightly    VITAMIN E PO    Take by mouth       ALLERGIES     Novocain [procaine] and Shrimp (diagnostic)    FAMILY HISTORY     History reviewed. No pertinent family history. SOCIAL HISTORY       Social History     Socioeconomic History    Marital status:      Spouse name: None    Number of children: None    Years of education: None    Highest education level: None   Occupational History    None   Tobacco Use    Smoking status: Never Smoker    Smokeless tobacco: Never Used   Substance and Sexual Activity    Alcohol use: Yes     Comment: rarely    Drug use: No    Sexual activity: None   Other Topics Concern    None   Social History Narrative    None     Social Determinants of Health     Financial Resource Strain: Low Risk     Difficulty of Paying Living Expenses: Not hard at all   Food Insecurity: No Food Insecurity    Worried About Running Out of Food in the Last Year: Never true    [address] in the Last Year: Never true   Transportation Needs:     Lack of Transportation (Medical):      Lack of Transportation (Non-Medical):    Physical Activity:     Days of Exercise per Week:     Minutes of Exercise per Session:    Stress:     Feeling of Stress :    Social Connections:     Frequency of Communication with Friends and Family:     Frequency of Social Gatherings with Friends and Family:     Attends Worship Services:     Active Member of Clubs or Organizations:     Attends Club or Organization Meetings:     Marital Status:    Intimate Partner Violence:     Fear of Current or Ex-Partner:     Emotionally Abused:     Physically Abused:     Sexually Abused:          PHYSICAL EXAM ED Triage Vitals   BP Temp Temp src Pulse Resp SpO2 Height Weight   -- -- -- -- -- -- -- --       Physical Exam  Vitals and nursing note reviewed. Constitutional:       Appearance: She is well-developed. HENT:      Head: Normocephalic. Right Ear: External ear normal.      Left Ear: External ear normal.   Eyes:      Conjunctiva/sclera: Conjunctivae normal.      Pupils: Pupils are equal, round, and reactive to light. Cardiovascular:      Rate and Rhythm: Normal rate and regular rhythm. Heart sounds: Normal heart sounds. Pulmonary:      Effort: Pulmonary effort is normal.      Breath sounds: Normal breath sounds. Abdominal:      General: Bowel sounds are normal. There is no distension. Palpations: Abdomen is soft. Tenderness: There is no abdominal tenderness. Musculoskeletal:         General: Normal range of motion. Cervical back: Normal range of motion and neck supple. Skin:     General: Skin is warm and dry. Neurological:      Mental Status: She is alert and oriented to person, place, and time. Psychiatric:         Mood and Affect: Mood normal.           MDM  80 yo female presents to the ED s/p fall with closed head injury and weakness. Pt is noted to have a temp of 100.7, hemodynamically stable. Pt's pulse ox noted to be 89% on RA. Pt placed on 2 L NC with improvement of sats to 94%. Pt given 1 L NS, IV morphine, IV zofran in the ED. Pt also given PO tylenol for fever. EKG shows NSR with HR 74, normal axis, normal intervals, no ST changes. Labs unremarkable. Covid positive. CT head negative. CXR shows mild diffuse bilateral infiltrates. Pt reassessed and still feeling weak. Given fever, hypoxia, covid pneumonia, fall with closed head injury, case discussed with Dr. Kari Munoz (medicine) and pt admitted to medicine in stable condition. FINAL IMPRESSION      1. Fever, unspecified fever cause    2. Pneumonia due to COVID-19 virus    3. Hypoxia    4.

## 2021-06-29 NOTE — H&P
Hospitalist History and Physical  Name: Blayne Adams  Age: 79 y.o. Gender: female  CodeStatus: No Order  Allergies: Novocain [Procaine]  Shrimp (Diagnostic)    Chief Complaint:Fall (fell twice in last three days. ) and Fatigue (covid + 10 days ago, generalized weakness recently. )    Primary Care Provider: Lang Orozco MD  InpatientTreatment Team: Treatment Team: Attending Provider: Yasmine Parker MD  Admission Date: 6/29/2021      Subjective: Patient is a 79year old female with past medical history of OA, HTN, HLD and GERD who presented to the emergency room with increased weakness causing fall and headache. Patient reports having increased weakness over the past week after being diagnosis with Covid. She reports falling twice in the last three days. Today she fell hitting her forehead (causing small laceration) and LOC. She also endorses epigastric pain, nonproductive cough, nausea and poor appetite. On arrival SpO2 was 89%, improved with 2l NC, Temp of 100.7 and hemodynamically stable. Physical Exam  Constitutional:       Appearance: Normal appearance. HENT:      Head: Normocephalic and atraumatic. Right Ear: External ear normal.      Left Ear: External ear normal.      Nose: Nose normal.      Mouth/Throat:      Mouth: Mucous membranes are dry. Pharynx: Oropharynx is clear. Eyes:      Extraocular Movements: Extraocular movements intact. Conjunctiva/sclera: Conjunctivae normal.      Pupils: Pupils are equal, round, and reactive to light. Cardiovascular:      Rate and Rhythm: Normal rate and regular rhythm. Pulses: Normal pulses. Heart sounds: Normal heart sounds. Pulmonary:      Effort: Pulmonary effort is normal.      Breath sounds: Normal breath sounds. Abdominal:      General: Bowel sounds are normal.      Palpations: Abdomen is soft. Musculoskeletal:         General: Normal range of motion. Cervical back: Normal range of motion and neck supple.    Skin: General: Skin is warm and dry. Capillary Refill: Capillary refill takes less than 2 seconds. Neurological:      General: No focal deficit present. Mental Status: She is alert and oriented to person, place, and time. Psychiatric:         Mood and Affect: Mood normal.         Review of Systems   Constitutional: Positive for activity change, appetite change, fatigue and fever. HENT: Negative. Eyes: Negative. Respiratory: Positive for cough. Cardiovascular: Negative. Gastrointestinal: Positive for nausea. Epigastric discomfort    Endocrine: Negative. Genitourinary: Negative. Musculoskeletal: Positive for arthralgias, gait problem and myalgias. Skin: Negative. Allergic/Immunologic: Negative. Neurological: Positive for weakness. Hematological: Negative. Psychiatric/Behavioral: Negative. Medications:  Reviewed     No current facility-administered medications on file prior to encounter. Current Outpatient Medications on File Prior to Encounter   Medication Sig Dispense Refill    fluticasone (FLONASE) 50 MCG/ACT nasal spray 2 sprays by Nasal route daily for 14 days (Patient not taking: Reported on 6/24/2021) 1 Bottle 0    amLODIPine (NORVASC) 2.5 MG tablet Take 1 tablet by mouth daily 90 tablet 3    simvastatin (ZOCOR) 10 MG tablet TAKE ONE TABLET BY MOUTH ONCE DAILY IN THE EVENING 90 tablet 3    VITAMIN E PO Take by mouth      Ascorbic Acid (VITAMIN C PO) Take by mouth      simvastatin (ZOCOR) 10 MG tablet Take 10 mg by mouth nightly (Patient not taking: Reported on 6/24/2021)      mometasone (ELOCON) 0.1 % cream Apply topically daily.  50 g 11        Past Medical History:   Diagnosis Date    Acid reflux     Chronic back pain     Hyperlipidemia     Hypertension     Osteoarthritis     shots in knees by ortho       Past Surgical History:   Procedure Laterality Date    APPENDECTOMY  1959    BREAST SURGERY      HYSTERECTOMY  1992    AK COLON CA SCRN NOT HI RSK IND N/A 5/9/2018    COLONOSCOPY performed by Deepa Jernigan MD at [address] [place] [address]        History reviewed. No pertinent family history. Social History     Socioeconomic History    Marital status:      Spouse name: Not on file    Number of children: Not on file    Years of education: Not on file    Highest education level: Not on file   Occupational History    Not on file   Tobacco Use    Smoking status: Never Smoker    Smokeless tobacco: Never Used   Substance and Sexual Activity    Alcohol use: Yes     Comment: rarely    Drug use: No    Sexual activity: Not on file   Other Topics Concern    Not on file   Social History Narrative    Not on file     Social Determinants of Health     Financial Resource Strain: Low Risk     Difficulty of Paying Living Expenses: Not hard at all   Food Insecurity: No Food Insecurity    Worried About [address] in the Last Year: Never true    [address] in the Last Year: Never true   Transportation Needs:     Lack of Transportation (Medical):      Lack of Transportation (Non-Medical):    Physical Activity:     Days of Exercise per Week:     Minutes of Exercise per Session:    Stress:     Feeling of Stress :    Social Connections:     Frequency of Communication with Friends and Family:     Frequency of Social Gatherings with Friends and Family:     Attends Restoration[employer] Services:     Active Member of Clubs or Organizations:     Attends Club or Organization Meetings:     Marital Status:    Intimate Partner Violence:     Fear of Current or Ex-Partner:     Emotionally Abused:     Physically Abused:     Sexually Abused:         Infusion Medications:   Scheduled Medications:    sodium chloride  1,000 mL Intravenous Once     PRN Meds:     Labs:   Recent Labs     06/29/21  1100   WBC 6.8   HGB 13.8   HCT 41.3        Recent Labs     06/29/21  1100      K 4.2   CL 96   CO2 30   BUN 16   CREATININE 0.77   CALCIUM 9.0     Recent Labs     06/29/21  1100   AST 24   ALT 31   BILITOT 0.3   ALKPHOS 79     No results for input(s): INR in the last 72 hours. Recent Labs     06/29/21  1100   TROPONINI <0.010       Urinalysis:   Lab Results   Component Value Date    BLOODU - 11/18/2019    SPECGRAV 1.020 11/18/2019    GLUCOSEU - 11/18/2019       Radiology:   Most recent    Chest CT      WITH CONTRAST:No results found for this or any previous visit. WITHOUT CONTRAST: No results found for this or any previous visit. CXR      2-view: Results for orders placed in visit on 04/17/18    XR CHEST STANDARD (2 VW)    Narrative  EXAMINATION: XR CHEST (2 VW)    CLINICAL HISTORY: R07.89 Chest wall pain ICD10    COMPARISONS: None available. FINDINGS: Cardiac size and pulmonary vascularity are normal. The lungs are clear. There is no evidence of adenopathy. There is mild spondylosis. There is mild wedge compression deformity of T8, presumably old. Impression  NO ACUTE CHEST DISEASE. Portable: Results for orders placed during the hospital encounter of 06/29/21    XR CHEST PORTABLE    Narrative  EXAMINATION: XR CHEST PORTABLE    CLINICAL HISTORY: WEAKNESS, COUGH    COMPARISONS: CHEST RADIOGRAPH APRIL 17, 2018    FINDINGS: Low lung volumes. Osseous structures intact. Cardiopericardial silhouette normal. Ill-defined area increased opacity left lung base. Impression  LEFT LOWER LUNG SUBSEGMENTAL ATELECTASIS/PNEUMONIA IN THIS PARTIALLY EXPIRATORY CHEST RADIOGRAPH. Echo No results found for this or any previous visit. Assessment/Plan:    Acute Respiratory failure with hypoxia secondary to Covid_19 Pneumonia:   -Obtain ABGs if indicated   -continue supplemental O2 with goal SPO2 of 92% or greater.  Whenever wean down supplemental O2 to avoid lung injury due to O2 toxicity.   -Remdesivir  -decadron   -Lovenox   -albuterol  Q4hrs,   -chest PT, acapella, incentive spirometry  -mucinex also ordered.   -Fibrinogen, procalcitonin, D-dimer, LDH, vitamin D, ferritin  -Droplet plus precautions    I personally spent estimated 60 minutes with this patient today. Additional work up or/and treatment plan may be added today or then after based on clinical progression. I am managing a portion of pt care. Some medical issues are handled by other specialists. Additional work up and treatment should be done in out pt setting by pt PCP and other out pt providers. In addition to examining and evaluating pt, I spent additional time explaining care, normaland abnormal findings, and treatment plan. All of pt questions were answered. Counseling, diet and education were provided. Case will be discussed with nursing staff when appropriate. Family will be updated if and when appropriate. I saw and evaluated the pt.  I personally obtained the key and critical portions of the history and physical exam. I reviewed the mild level documentation and agree with assessment and plan that we come up together  Electronically signed by Fracisco Child MD on 6/30/21 at 9:51 AM EDT      Electronically signed by ILIA Strickland CNP on 6/29/2021 at 1:27 PM

## 2021-06-29 NOTE — ED NOTES
Dr. Cecilia Polo made aware of patient's SpO2 89% on room air. 2L O2 via nasal cannula applied.       Cherise Osorio RN  06/29/21 0212

## 2021-06-29 NOTE — ED NOTES
Patient arrived with 18g left AC IV from EMS, IV removed due to catheter being exposed.       Anne Marie Villavicencio RN  06/29/21 2833

## 2021-06-29 NOTE — PROGRESS NOTES
Order for Remdesivir received from ILIA Gill CNP     1)  Confirmed drug availability for complete patient course of therapy (200 mg IV x 1, then 100 mg daily on days 2-5)    2)  Inclusion Criteria:  Reviewed/Confirmed with provider criteria present   Adults, children (?3.5Kg, only use lyophilized powder), or pregnant women with proven COVID19 as defined by a positive nasopharyngeal swab, tracheal aspirate or sputum   SARS-CoV-2 PCR or a positive serology test requiring hospitalization for COVID-19-severe pneumonia.    Requiring supplemental oxygen     COVID-19 detected on 06/29/21 at 1131    3)  Recommend prioritization of patients who present with symptom onset < 14 days and within 10 days of acute care admission     4)  Exclusion Criteria:  Reviewed/Confirmed none of the following  present: (criteria in bold present during review; risk/benefit rationale of physician documented)    Requiring invasive or non-invasive mechanical ventilation  A) Consider use in patients requiring high-flow oxygen early in the disease state (based on symptom duration)    Use of more than 1 vasopressor agent prior to start of remdesivir    Already improving on current treatment/supportive regimen as evidenced by improving oxygenation, and/or impending discharge    Patients in whom the clinical team think death is in the immediate short-term whereby administration of RDV unlikely to change clinical outcome     5) Monitoring Parameters:  CMP (includes hepatic panel) x 5 days    Consider discontinuation of remdesivir if LFTs substantially increase following initiation of therapy (ie: 5x baseline lab values) or if ALT elevation is accompanied by signs or symptoms of liver inflammation    GFR < 30mL/min: Use with caution due to risk of cyclodextrin toxicity with IV solution as it accumulates in reduced renal clearance       Recent Labs     06/29/21  1100   CREATININE 0.77   Estimated Creatinine Clearance: 65 mL/min (based on SCr of 0.77 mg/dL). .     Recent Labs     06/29/21  1100   ALT 31   AST 24     Pharmacy will continue to monitor daily.     Eloisa Aguilar, PharmD   6/29/2021 2:43 PM

## 2021-06-29 NOTE — TELEPHONE ENCOUNTER
Patient daughter called an stated her mother fall at home x 2 back in the [facility].

## 2021-06-29 NOTE — ED TRIAGE NOTES
Patient presents with complaints of generalized weakness x 3 days. Patient tested positive for COVID about 10 days ago. Patient alert and oriented on arrival. Patient reports falling twice in the last three days, small lac noted above right eyebrow. Patient reports she has not been eating and drinking very well since being diagnosed. Respirations equal and unlabored on arrival. Skin pink, warm, and dry. No distress noted on arrival. Patient fell this morning, reports head injury and LOC. Patient reports epigastric pain when she coughs. Patient states she's also experiencing nausea, denies vomiting or diarrhea.

## 2021-06-30 ENCOUNTER — APPOINTMENT (OUTPATIENT)
Dept: CT IMAGING | Age: 71
DRG: 177 | End: 2021-06-30
Payer: MEDICARE

## 2021-06-30 LAB
ALBUMIN SERPL-MCNC: 3.5 G/DL (ref 3.5–4.6)
ALP BLD-CCNC: 71 U/L (ref 40–130)
ALT SERPL-CCNC: 24 U/L (ref 0–33)
ANION GAP SERPL CALCULATED.3IONS-SCNC: 10 MEQ/L (ref 9–15)
AST SERPL-CCNC: 18 U/L (ref 0–35)
BASOPHILS ABSOLUTE: 0 K/UL (ref 0–0.2)
BASOPHILS RELATIVE PERCENT: 0.2 %
BILIRUB SERPL-MCNC: <0.2 MG/DL (ref 0.2–0.7)
BUN BLDV-MCNC: 19 MG/DL (ref 8–23)
C-REACTIVE PROTEIN: 51.6 MG/L (ref 0–5)
CALCIUM SERPL-MCNC: 9.2 MG/DL (ref 8.5–9.9)
CHLORIDE BLD-SCNC: 106 MEQ/L (ref 95–107)
CO2: 27 MEQ/L (ref 20–31)
CREAT SERPL-MCNC: 0.49 MG/DL (ref 0.5–0.9)
D DIMER: 0.53 MG/L FEU (ref 0–0.5)
EKG ATRIAL RATE: 74 BPM
EKG P AXIS: 50 DEGREES
EKG P-R INTERVAL: 144 MS
EKG Q-T INTERVAL: 362 MS
EKG QRS DURATION: 86 MS
EKG QTC CALCULATION (BAZETT): 401 MS
EKG R AXIS: -19 DEGREES
EKG T AXIS: 68 DEGREES
EKG VENTRICULAR RATE: 74 BPM
EOSINOPHILS ABSOLUTE: 0 K/UL (ref 0–0.7)
EOSINOPHILS RELATIVE PERCENT: 0 %
FERRITIN: 381.7 NG/ML (ref 13–150)
FIBRINOGEN: 553 MG/DL (ref 235–507)
GFR AFRICAN AMERICAN: >60
GFR NON-AFRICAN AMERICAN: >60
GLOBULIN: 2.9 G/DL (ref 2.3–3.5)
GLUCOSE BLD-MCNC: 172 MG/DL (ref 70–99)
HCT VFR BLD CALC: 40.8 % (ref 37–47)
HEMOGLOBIN: 13.4 G/DL (ref 12–16)
LACTATE DEHYDROGENASE: 191 U/L (ref 135–214)
LYMPHOCYTES ABSOLUTE: 1.1 K/UL (ref 1–4.8)
LYMPHOCYTES RELATIVE PERCENT: 21.9 %
MCH RBC QN AUTO: 27.9 PG (ref 27–31.3)
MCHC RBC AUTO-ENTMCNC: 32.8 % (ref 33–37)
MCV RBC AUTO: 85 FL (ref 82–100)
MONOCYTES ABSOLUTE: 0.5 K/UL (ref 0.2–0.8)
MONOCYTES RELATIVE PERCENT: 9.7 %
NEUTROPHILS ABSOLUTE: 3.4 K/UL (ref 1.4–6.5)
NEUTROPHILS RELATIVE PERCENT: 68.2 %
PDW BLD-RTO: 13.8 % (ref 11.5–14.5)
PLATELET # BLD: 313 K/UL (ref 130–400)
POTASSIUM REFLEX MAGNESIUM: 4.4 MEQ/L (ref 3.4–4.9)
RBC # BLD: 4.8 M/UL (ref 4.2–5.4)
SODIUM BLD-SCNC: 143 MEQ/L (ref 135–144)
TOTAL PROTEIN: 6.4 G/DL (ref 6.3–8)
VITAMIN D 25-HYDROXY: 39.2 NG/ML (ref 30–100)
WBC # BLD: 5 K/UL (ref 4.8–10.8)

## 2021-06-30 PROCEDURE — 85384 FIBRINOGEN ACTIVITY: CPT

## 2021-06-30 PROCEDURE — 2580000003 HC RX 258: Performed by: NURSE PRACTITIONER

## 2021-06-30 PROCEDURE — 82306 VITAMIN D 25 HYDROXY: CPT

## 2021-06-30 PROCEDURE — 82728 ASSAY OF FERRITIN: CPT

## 2021-06-30 PROCEDURE — 6360000002 HC RX W HCPCS: Performed by: NURSE PRACTITIONER

## 2021-06-30 PROCEDURE — 6370000000 HC RX 637 (ALT 250 FOR IP): Performed by: NURSE PRACTITIONER

## 2021-06-30 PROCEDURE — 83615 LACTATE (LD) (LDH) ENZYME: CPT

## 2021-06-30 PROCEDURE — 71275 CT ANGIOGRAPHY CHEST: CPT

## 2021-06-30 PROCEDURE — 6370000000 HC RX 637 (ALT 250 FOR IP): Performed by: INTERNAL MEDICINE

## 2021-06-30 PROCEDURE — 85379 FIBRIN DEGRADATION QUANT: CPT

## 2021-06-30 PROCEDURE — 86140 C-REACTIVE PROTEIN: CPT

## 2021-06-30 PROCEDURE — 2700000000 HC OXYGEN THERAPY PER DAY

## 2021-06-30 PROCEDURE — 2580000003 HC RX 258: Performed by: INTERNAL MEDICINE

## 2021-06-30 PROCEDURE — 93010 ELECTROCARDIOGRAM REPORT: CPT | Performed by: INTERNAL MEDICINE

## 2021-06-30 PROCEDURE — 99232 SBSQ HOSP IP/OBS MODERATE 35: CPT | Performed by: INTERNAL MEDICINE

## 2021-06-30 PROCEDURE — 2500000003 HC RX 250 WO HCPCS: Performed by: NURSE PRACTITIONER

## 2021-06-30 PROCEDURE — 36415 COLL VENOUS BLD VENIPUNCTURE: CPT

## 2021-06-30 PROCEDURE — 85025 COMPLETE CBC W/AUTO DIFF WBC: CPT

## 2021-06-30 PROCEDURE — 80053 COMPREHEN METABOLIC PANEL: CPT

## 2021-06-30 PROCEDURE — 1210000000 HC MED SURG R&B

## 2021-06-30 PROCEDURE — 6360000004 HC RX CONTRAST MEDICATION: Performed by: INTERNAL MEDICINE

## 2021-06-30 RX ORDER — GUAIFENESIN 600 MG/1
600 TABLET, EXTENDED RELEASE ORAL 2 TIMES DAILY
Status: DISCONTINUED | OUTPATIENT
Start: 2021-06-30 | End: 2021-07-01 | Stop reason: HOSPADM

## 2021-06-30 RX ORDER — SODIUM CHLORIDE 9 MG/ML
INJECTION, SOLUTION INTRAVENOUS CONTINUOUS
Status: DISCONTINUED | OUTPATIENT
Start: 2021-06-30 | End: 2021-07-01

## 2021-06-30 RX ADMIN — IOPAMIDOL 100 ML: 755 INJECTION, SOLUTION INTRAVENOUS at 17:44

## 2021-06-30 RX ADMIN — REMDESIVIR 100 MG: 100 INJECTION, POWDER, LYOPHILIZED, FOR SOLUTION INTRAVENOUS at 16:33

## 2021-06-30 RX ADMIN — DEXAMETHASONE 6 MG: 6 TABLET ORAL at 10:18

## 2021-06-30 RX ADMIN — Medication 6000 UNITS: at 10:18

## 2021-06-30 RX ADMIN — ONDANSETRON 4 MG: 2 INJECTION INTRAMUSCULAR; INTRAVENOUS at 16:33

## 2021-06-30 RX ADMIN — FAMOTIDINE 20 MG: 20 TABLET ORAL at 10:18

## 2021-06-30 RX ADMIN — SODIUM CHLORIDE: 9 INJECTION, SOLUTION INTRAVENOUS at 10:19

## 2021-06-30 RX ADMIN — SALINE NASAL SPRAY 2 SPRAY: 1.5 SOLUTION NASAL at 20:44

## 2021-06-30 RX ADMIN — FAMOTIDINE 20 MG: 20 TABLET ORAL at 20:42

## 2021-06-30 RX ADMIN — SALINE NASAL SPRAY 2 SPRAY: 1.5 SOLUTION NASAL at 16:34

## 2021-06-30 RX ADMIN — BACITRACIN, NEOMYCIN, POLYMYXIN B 1 G: 400; 3.5; 5 OINTMENT TOPICAL at 20:42

## 2021-06-30 RX ADMIN — ENOXAPARIN SODIUM 30 MG: 30 INJECTION SUBCUTANEOUS at 10:18

## 2021-06-30 RX ADMIN — SODIUM CHLORIDE: 9 INJECTION, SOLUTION INTRAVENOUS at 20:41

## 2021-06-30 RX ADMIN — BACITRACIN, NEOMYCIN, POLYMYXIN B 1 G: 400; 3.5; 5 OINTMENT TOPICAL at 10:18

## 2021-06-30 RX ADMIN — ACETAMINOPHEN 650 MG: 325 TABLET ORAL at 12:04

## 2021-06-30 RX ADMIN — SODIUM CHLORIDE, PRESERVATIVE FREE 10 ML: 5 INJECTION INTRAVENOUS at 10:18

## 2021-06-30 RX ADMIN — SODIUM CHLORIDE, PRESERVATIVE FREE 10 ML: 5 INJECTION INTRAVENOUS at 20:43

## 2021-06-30 RX ADMIN — GUAIFENESIN 600 MG: 600 TABLET ORAL at 20:42

## 2021-06-30 RX ADMIN — ENOXAPARIN SODIUM 30 MG: 30 INJECTION SUBCUTANEOUS at 20:42

## 2021-06-30 ASSESSMENT — ENCOUNTER SYMPTOMS
NAUSEA: 0
SHORTNESS OF BREATH: 1
VOMITING: 0
COUGH: 1

## 2021-06-30 ASSESSMENT — PAIN SCALES - GENERAL
PAINLEVEL_OUTOF10: 7
PAINLEVEL_OUTOF10: 0
PAINLEVEL_OUTOF10: 0

## 2021-06-30 NOTE — PROGRESS NOTES
Shift assessment performed, vitals currently stable. Weaned patient off of oxygen- patient sat'ing at 96% on room air. Patient denies any SOB, patient using bedside commode- weakness is noted upon transfer to [facility]. Patient shaky while ambulating. Patient is A&Ox4, bed linens changed. Patient states she has stress incontinence when sneezing. Bowel sounds present. Patient has diminished lung sounds. Patient c/o head pressure/HA- medicated with tylenol. Will continue to monitor.      Electronically signed by Irina Gonzalez RN on 6/30/2021 at 2:20 PM

## 2021-06-30 NOTE — ACP (ADVANCE CARE PLANNING)
Advance Care Planning     Advance Care Planning Clinical Specialist  Conversation Note      Date of ACP Conversation: 6/29/2021    Conversation Conducted with: Patient with Decision Making Capacity    ACP Clinical Specialist: Katrin Napoles RN      Healthcare Decision Maker:     Current Designated Healthcare Decision Maker:     Primary Decision Maker: MARK LEMA - Spouse - [phone]    Secondary Decision Maker: margaret liriano - Child - [phone]    Care Preferences    Hospitalization: \"If your health worsens and it becomes clear that your chance of recovery is unlikely, what would your preference be regarding hospitalization? \"    Choice:  [x] The patient wants hospitalization. [] The patient prefers comfort-focused treatment without hospitalization. Ventilation: \"If you were in your present state of health and suddenly became very ill and were unable to breathe on your own, what would your preference be about the use of a ventilator (breathing machine) if it were available to you? \"      If the patient would desire the use of ventilator (breathing machine), answer \"yes\". If not, \"no\": yes    \"If your health worsens and it becomes clear that your chance of recovery is unlikely, what would your preference be about the use of a ventilator (breathing machine) if it were available to you? \"     Would the patient desire the use of ventilator (breathing machine)?: No      Resuscitation  \"CPR works best to restart the heart when there is a sudden event, like a heart attack, in someone who is otherwise healthy. Unfortunately, CPR does not typically restart the heart for people who have serious health conditions or who are very sick. \"    \"In the event your heart stopped as a result of an underlying serious health condition, would you want attempts to be made to restart your heart (answer \"yes\" for attempt to resuscitate) or would you prefer a natural death (answer \"no\" for do not attempt to resuscitate)? \" yes       [] Yes   [] No   Educated Patient / Rebbecca Branch regarding differences between Advance Directives and portable DNR orders.     Length of ACP Conversation in minutes:  15 MIN    Conversation Outcomes:  [x] ACP discussion completed  [] Existing advance directive reviewed with patient; no changes to patient's previously recorded wishes  [] New Advance Directive completed  [] Portable Do Not Rescitate prepared for Provider review and signature  [] POLST/POST/MOLST/MOST prepared for Provider review and signature      Follow-up plan:    [] Schedule follow-up conversation to continue planning  [] Referred individual to Provider for additional questions/concerns   [] Advised patient/agent/surrogate to review completed ACP document and update if needed with changes in condition, patient preferences or care setting    [] This note routed to one or more involved healthcare providers

## 2021-06-30 NOTE — PROGRESS NOTES
Patient c/o facial pain. States she thinks it's from falling. Made attending physician aware. Patient educated on proning importance- tolerating proning at this time.      Electronically signed by Adriana Sicard, RN on 6/30/2021 at 4:47 PM

## 2021-06-30 NOTE — PROGRESS NOTES
INPATIENT PROGRESS NOTES    PATIENT NAME: Payal Mcintyre  MRN: [id number]  SERVICE DATE:  June 30, 2021   SERVICE TIME:  11:54 AM      PRIMARY SERVICE: Pulmonary Disease    CHIEF COMPLAIN: COVID-19 infection      INTERVAL HPI: Patient seen and examined at bedside, Interval Notes, orders reviewed. Nursing notes noted  Patient states she is feeling little better. She is still having cough which is mostly dry as well as short of breath and generalized weakness. No fever or chills. On 2 L O2 via nasal cannula O2 saturation 98%. OBJECTIVE    Body mass index is 31.09 kg/m². PHYSICAL EXAM:  Vitals:  /64   Pulse 69   Temp 97.8 °F (36.6 °C) (Oral)   Resp 20   Ht 5' 2\" (1.575 m)   Wt 170 lb (77.1 kg)   SpO2 98%   BMI 31.09 kg/m²   General:Alert, awake . comfortable in bed, No distress. Head: Atraumatic , Normocephalic   Eyes: PERRL. No sclera icterus. No conjunctival injection. No discharge   ENT: No nasal  discharge. Pharynx clear. Neck:  Trachea midline. No thyromegaly, no JVD, No cervical adenopathy. Chest : Bilaterally symmetrical ,Normal effort,  No accessory muscle use  Lung : . Fair BS bilateral, decreased BS at bases. No Rales. No wheezing. No rhonchi. Heart[de-identified] Normal  rate. Regular rhythm. No mumur ,  Rub or gallop  ABD: Non-tender. Non-distended. No masses. No organmegaly. Normal bowel sounds. No hernia.   Ext : No Pitting both leg , No Cyanosis No clubbing  Neuro: no focal weakness          DATA:   Recent Labs     06/29/21  1100 06/30/21  1024   WBC 6.8 5.0   HGB 13.8 13.4   HCT 41.3 40.8   MCV 83.1 85.0    313     Recent Labs     06/29/21  1100 06/29/21  1100 06/30/21  1024     --  143   K 4.2  --  4.4   CL 96  --  106   CO2 30  --  27   BUN 16  --  19   CREATININE 0.77  --  0.49*   GLUCOSE 129*  --  172*   CALCIUM 9.0  --  9.2   PROT 7.0  --  6.4   LABALBU 3.9  --  3.5   BILITOT 0.3  --  <0.2   ALKPHOS 79  --  71   AST 24  --  18   ALT 31   < > 24   LABGLOM >60.0   < > >60.0   GFRAA >60.0   < > >60.0   GLOB 3.1   < > 2.9    < > = values in this interval not displayed. MV Settings:          No results for input(s): PHART, MBI8QRO, PO2ART, AHS2FUP, BEART, U5BZAPBI in the last 72 hours. O2 Device: Nasal cannula  O2 Flow Rate (L/min): 2 L/min    ADULT DIET; Regular; Safety Tray; Safety Tray (Disposables)     MEDICATIONS during current hospitalization:    Continuous Infusions:   sodium chloride 100 mL/hr at 06/30/21 1019    sodium chloride         Scheduled Meds:   dexamethasone  6 mg Oral Daily    sodium chloride flush  5-40 mL Intravenous 2 times per day    enoxaparin  30 mg Subcutaneous BID    Vitamin D  6,000 Units Oral Daily    Followed by   Patricio Leaks ON 7/6/2021] Vitamin D  2,000 Units Oral Daily    famotidine  20 mg Oral BID    neomycin-bacitracin-polymyxin   Topical BID    remdesivir IVPB  100 mg Intravenous Q24H       PRN Meds:sodium chloride flush, sodium chloride, ondansetron **OR** ondansetron, polyethylene glycol, acetaminophen **OR** acetaminophen, guaiFENesin-dextromethorphan    Radiology  CT Head WO Contrast    Result Date: 6/29/2021  EXAMINATION: CT of the brain without contrast HISTORY: Pain after a fall COMPARISON: None available TECHNIQUE: Multiple contiguous axial images were obtained of the brain from the skull base through the vertex. Multiplanar reformats were obtained. FINDINGS: Brain volume is age appropriate. Ventricular morphology is within normal limits. Gray-white matter differentiation is preserved. No acute hemorrhage or abnormal extra-axial fluid collection. No mass effect or midline shift. Mild paranasal sinus mucosal thickening. The mastoid air cells are clear. Calvarium is intact. No acute intracranial process. All CT scans at this facility use dose modulation, iterative reconstruction, and/or weight based dosing when appropriate to reduce radiation dose to as low as reasonably achievable.     XR CHEST PORTABLE    Result Date: 6/29/2021  EXAMINATION: XR CHEST PORTABLE CLINICAL HISTORY: WEAKNESS, COUGH COMPARISONS: CHEST RADIOGRAPH APRIL 17, 2018 FINDINGS: Low lung volumes. Osseous structures intact. Cardiopericardial silhouette normal. Ill-defined area increased opacity left lung base. LEFT LOWER LUNG SUBSEGMENTAL ATELECTASIS/PNEUMONIA IN THIS PARTIALLY EXPIRATORY CHEST RADIOGRAPH. IMPRESSION AND SUGGESTION:  1. COVID-19 virus infection and possible pneumonia  2. Hypoxia secondary to above  3. Left lung base opacity pneumonia versus atelectasis  4. History of fall with closed head injury  5. Generalized weakness  6. Hypertension  7. Hyperlipidemia    Continue O2 to keep saturation 92% or above. Continue dexamethasone 6 mg p.o. daily, remdesivir 100 mg IV daily. Lovenox 30 mg subcu twice a day. She is clinically improving continue present treatment plan    NOTE: This report was transcribed using voice recognition software. Every effort was made to ensure accuracy; however, inadvertent computerized transcription errors may be present.       Electronically signed by Penny Rodriguez MD, FCCP on 6/30/2021 at 11:54 AM

## 2021-06-30 NOTE — PROGRESS NOTES
H&P reviewed. The patient was examined and there are no changes to the H&P.         RDW 13.5        CHEMISTRIES:  Recent Labs     06/29/21  1100      K 4.2   CL 96   CO2 30   BUN 16   CREATININE 0.77   GLUCOSE 129*   MG 2.0     PT/INR:No results for input(s): PROTIME, INR in the last 72 hours. APTT:No results for input(s): APTT in the last 72 hours. LIVER PROFILE:  Recent Labs     06/29/21  1100   AST 24   ALT 31   BILITOT 0.3   ALKPHOS 79       Imaging Last 24 Hours:  CT Head WO Contrast    Result Date: 6/29/2021  EXAMINATION: CT of the brain without contrast HISTORY: Pain after a fall COMPARISON: None available TECHNIQUE: Multiple contiguous axial images were obtained of the brain from the skull base through the vertex. Multiplanar reformats were obtained. FINDINGS: Brain volume is age appropriate. Ventricular morphology is within normal limits. Gray-white matter differentiation is preserved. No acute hemorrhage or abnormal extra-axial fluid collection. No mass effect or midline shift. Mild paranasal sinus mucosal thickening. The mastoid air cells are clear. Calvarium is intact. No acute intracranial process. All CT scans at this facility use dose modulation, iterative reconstruction, and/or weight based dosing when appropriate to reduce radiation dose to as low as reasonably achievable. XR CHEST PORTABLE    Result Date: 6/29/2021  EXAMINATION: XR CHEST PORTABLE CLINICAL HISTORY: WEAKNESS, COUGH COMPARISONS: CHEST RADIOGRAPH APRIL 17, 2018 FINDINGS: Low lung volumes. Osseous structures intact. Cardiopericardial silhouette normal. Ill-defined area increased opacity left lung base. LEFT LOWER LUNG SUBSEGMENTAL ATELECTASIS/PNEUMONIA IN THIS PARTIALLY EXPIRATORY CHEST RADIOGRAPH. Assessment//Plan           Hospital Problems         Last Modified POA    Pneumonia due to COVID-19 virus 6/29/2021 Yes        Assessment & Plan  6/30: Continue remdesivir, continue with p.o. Decadron, continue with anticoagulation. Follow D-dimer.  If D-dimer positive CT angio to rule out PE.

## 2021-06-30 NOTE — PROGRESS NOTES
Shift assessment complete. VS are stable. Pt is AxoX4. Pt up to restroom with PCA, and fresh linens and gown. Lungs diminished. On 2L NC sating 99%. Pt has slight cough. Abd soft, bowel sounds present. Call light within reach. Will continue to monitor.       Electronically signed by Nisreen Maurice RN on 6/29/2021 at 10:41 PM

## 2021-07-01 VITALS
OXYGEN SATURATION: 97 % | DIASTOLIC BLOOD PRESSURE: 53 MMHG | BODY MASS INDEX: 31.28 KG/M2 | HEIGHT: 62 IN | TEMPERATURE: 97.9 F | HEART RATE: 60 BPM | SYSTOLIC BLOOD PRESSURE: 108 MMHG | WEIGHT: 170 LBS | RESPIRATION RATE: 18 BRPM

## 2021-07-01 LAB
ALBUMIN SERPL-MCNC: 3.2 G/DL (ref 3.5–4.6)
ALP BLD-CCNC: 60 U/L (ref 40–130)
ALT SERPL-CCNC: 21 U/L (ref 0–33)
ANION GAP SERPL CALCULATED.3IONS-SCNC: 10 MEQ/L (ref 9–15)
AST SERPL-CCNC: 20 U/L (ref 0–35)
BASOPHILS ABSOLUTE: 0 K/UL (ref 0–0.2)
BASOPHILS RELATIVE PERCENT: 0.3 %
BILIRUB SERPL-MCNC: <0.2 MG/DL (ref 0.2–0.7)
BUN BLDV-MCNC: 16 MG/DL (ref 8–23)
C-REACTIVE PROTEIN: 20.7 MG/L (ref 0–5)
CALCIUM SERPL-MCNC: 8.6 MG/DL (ref 8.5–9.9)
CHLORIDE BLD-SCNC: 105 MEQ/L (ref 95–107)
CO2: 28 MEQ/L (ref 20–31)
CREAT SERPL-MCNC: 0.53 MG/DL (ref 0.5–0.9)
D DIMER: 0.35 MG/L FEU (ref 0–0.5)
EOSINOPHILS ABSOLUTE: 0 K/UL (ref 0–0.7)
EOSINOPHILS RELATIVE PERCENT: 0 %
GFR AFRICAN AMERICAN: >60
GFR NON-AFRICAN AMERICAN: >60
GLOBULIN: 2.7 G/DL (ref 2.3–3.5)
GLUCOSE BLD-MCNC: 146 MG/DL (ref 70–99)
HCT VFR BLD CALC: 38.5 % (ref 37–47)
HEMOGLOBIN: 12.8 G/DL (ref 12–16)
LYMPHOCYTES ABSOLUTE: 1.6 K/UL (ref 1–4.8)
LYMPHOCYTES RELATIVE PERCENT: 18.1 %
MCH RBC QN AUTO: 27.8 PG (ref 27–31.3)
MCHC RBC AUTO-ENTMCNC: 33.2 % (ref 33–37)
MCV RBC AUTO: 83.9 FL (ref 82–100)
MONOCYTES ABSOLUTE: 0.7 K/UL (ref 0.2–0.8)
MONOCYTES RELATIVE PERCENT: 8.2 %
NEUTROPHILS ABSOLUTE: 6.4 K/UL (ref 1.4–6.5)
NEUTROPHILS RELATIVE PERCENT: 73.4 %
PDW BLD-RTO: 13.8 % (ref 11.5–14.5)
PLATELET # BLD: 354 K/UL (ref 130–400)
POTASSIUM SERPL-SCNC: 4.4 MEQ/L (ref 3.4–4.9)
RBC # BLD: 4.59 M/UL (ref 4.2–5.4)
SODIUM BLD-SCNC: 143 MEQ/L (ref 135–144)
TOTAL PROTEIN: 5.9 G/DL (ref 6.3–8)
TSH SERPL DL<=0.05 MIU/L-ACNC: 0.47 UIU/ML (ref 0.44–3.86)
WBC # BLD: 8.7 K/UL (ref 4.8–10.8)

## 2021-07-01 PROCEDURE — 86140 C-REACTIVE PROTEIN: CPT

## 2021-07-01 PROCEDURE — 84443 ASSAY THYROID STIM HORMONE: CPT

## 2021-07-01 PROCEDURE — 80053 COMPREHEN METABOLIC PANEL: CPT

## 2021-07-01 PROCEDURE — 2700000000 HC OXYGEN THERAPY PER DAY

## 2021-07-01 PROCEDURE — 85025 COMPLETE CBC W/AUTO DIFF WBC: CPT

## 2021-07-01 PROCEDURE — 6370000000 HC RX 637 (ALT 250 FOR IP): Performed by: INTERNAL MEDICINE

## 2021-07-01 PROCEDURE — 2580000003 HC RX 258: Performed by: NURSE PRACTITIONER

## 2021-07-01 PROCEDURE — 85379 FIBRIN DEGRADATION QUANT: CPT

## 2021-07-01 PROCEDURE — 6370000000 HC RX 637 (ALT 250 FOR IP): Performed by: NURSE PRACTITIONER

## 2021-07-01 PROCEDURE — 36415 COLL VENOUS BLD VENIPUNCTURE: CPT

## 2021-07-01 PROCEDURE — 99232 SBSQ HOSP IP/OBS MODERATE 35: CPT | Performed by: INTERNAL MEDICINE

## 2021-07-01 PROCEDURE — 6360000002 HC RX W HCPCS: Performed by: NURSE PRACTITIONER

## 2021-07-01 PROCEDURE — 2500000003 HC RX 250 WO HCPCS: Performed by: NURSE PRACTITIONER

## 2021-07-01 RX ORDER — CHOLECALCIFEROL (VITAMIN D3) 50 MCG
2000 TABLET ORAL DAILY
Qty: 60 TABLET | Refills: 0 | Status: SHIPPED | OUTPATIENT
Start: 2021-07-06

## 2021-07-01 RX ORDER — LACTULOSE 10 G/15ML
30 SOLUTION ORAL ONCE
Status: COMPLETED | OUTPATIENT
Start: 2021-07-01 | End: 2021-07-01

## 2021-07-01 RX ORDER — DEXAMETHASONE 6 MG/1
6 TABLET ORAL
Qty: 7 TABLET | Refills: 0 | Status: SHIPPED | OUTPATIENT
Start: 2021-07-01 | End: 2021-07-08

## 2021-07-01 RX ORDER — GUAIFENESIN 600 MG/1
600 TABLET, EXTENDED RELEASE ORAL 2 TIMES DAILY
Qty: 30 TABLET | Refills: 0 | Status: SHIPPED | OUTPATIENT
Start: 2021-07-01 | End: 2021-07-16

## 2021-07-01 RX ORDER — DOCUSATE SODIUM 100 MG/1
100 CAPSULE, LIQUID FILLED ORAL 2 TIMES DAILY
Qty: 30 CAPSULE | Refills: 0 | Status: SHIPPED | OUTPATIENT
Start: 2021-07-01 | End: 2021-07-16

## 2021-07-01 RX ORDER — DEXAMETHASONE 6 MG/1
6 TABLET ORAL EVERY 6 HOURS
Qty: 7 TABLET | Refills: 0 | Status: SHIPPED | OUTPATIENT
Start: 2021-07-01 | End: 2021-07-01 | Stop reason: SDUPTHER

## 2021-07-01 RX ORDER — DOCUSATE SODIUM 100 MG/1
100 CAPSULE, LIQUID FILLED ORAL 2 TIMES DAILY
Status: DISCONTINUED | OUTPATIENT
Start: 2021-07-01 | End: 2021-07-01 | Stop reason: HOSPADM

## 2021-07-01 RX ADMIN — FAMOTIDINE 20 MG: 20 TABLET ORAL at 10:12

## 2021-07-01 RX ADMIN — REMDESIVIR 100 MG: 100 INJECTION, POWDER, LYOPHILIZED, FOR SOLUTION INTRAVENOUS at 13:00

## 2021-07-01 RX ADMIN — SODIUM CHLORIDE, PRESERVATIVE FREE 10 ML: 5 INJECTION INTRAVENOUS at 10:13

## 2021-07-01 RX ADMIN — GUAIFENESIN 600 MG: 600 TABLET ORAL at 10:12

## 2021-07-01 RX ADMIN — Medication 6000 UNITS: at 10:13

## 2021-07-01 RX ADMIN — BACITRACIN, NEOMYCIN, POLYMYXIN B 1 G: 400; 3.5; 5 OINTMENT TOPICAL at 10:12

## 2021-07-01 RX ADMIN — ENOXAPARIN SODIUM 30 MG: 30 INJECTION SUBCUTANEOUS at 10:13

## 2021-07-01 RX ADMIN — GUAIFENESIN SYRUP AND DEXTROMETHORPHAN 5 ML: 100; 10 SYRUP ORAL at 12:58

## 2021-07-01 RX ADMIN — DEXAMETHASONE 6 MG: 6 TABLET ORAL at 10:12

## 2021-07-01 RX ADMIN — DOCUSATE SODIUM 100 MG: 100 CAPSULE, LIQUID FILLED ORAL at 12:15

## 2021-07-01 RX ADMIN — LACTULOSE 30 G: 20 SOLUTION ORAL at 12:16

## 2021-07-01 NOTE — CARE COORDINATION
Morning rounds held. Pt. Now on room air 96-97%. Spoke with Dr. Mahi Lucas, he is discharging today and wants her to have 02 prn 2L. Obtained order, faxed to [facility]. Med's to beds arranged, pt to be on  Anticoagulation for 1 month, per Milton Khan RN they are in process of filling medications. 02 tank provided, spoke with pt and inst given to contact Medical Services when arrives home. Good understanding. Zettie Home RN notified of all.  Electronically signed by Flaquito Polo RN on 7/1/2021 at 11:36 AM
Pneumonia booklet and zones sheet, [employer] \"COVID-19 Discharge Instructions\" and \"Recovery After COVID-19\" left outside room as patient is in isolation. Requested that nurse take them in to the patient when she goes into the room next.
bedside instruction on administration of nebulizers and/or inhalers, and assessment of oxygen and equipment needs in the home? No    Initial Discharge Plan? (Note: please see concurrent daily documentation for any updates after initial note).     RETURN HOME WITH [facility] AT [address] AT D/C    Readmission Risk              Risk of Unplanned Readmission:  9         Electronically signed by Katrin Napoles RN on 6/30/2021 at 3:18 PM

## 2021-07-01 NOTE — PROGRESS NOTES
CLINICAL PHARMACY NOTE: MEDS TO BEDS    Total # of Prescriptions Filled: 5   The following medications were delivered to the patient:  · Vitamin d3 50mcg tab  · Deep sea nasal spray 0.065%   · Eliquis 2.5mg tab  · Docusate 100mg tab   · Dexamethasone 6mg tab    Additional Documentation:

## 2021-07-01 NOTE — DISCHARGE SUMMARY
Discharge Summary    Date: 7/1/2021  Patient Name: Jennifer Hernandez YOB: 1950 Age: 79 y.o. Admit Date: 6/29/2021  Discharge Date: 7/1/2021  Discharge Condition: [address]    Admission Diagnosis  Pneumonia due to COVID-19 virus (U07.1, J12.82)     Discharge Diagnosis  Active Problems: Pneumonia due to COVID-19 virusResolved Problems: * No resolved hospital problems. [facility] Stay  Narrative of Hospital Course:  Patient comes acute respiratory failure secondary to Covid pneumonia. Received Remdesivir. Doing better now. Patient can be discharged home. Patient received 3 doses of remdesivir. Evaluated by pulmonary. On room air now. Patient was discharged Eliquis 2.5 mg p.o. twice daily for 1 month. Patient also had a thyroid nodule needs to follow with PCP as outpatient. TSH came back normal. She continues to self isolate herself for 10 days. CT chest with IV contrast did not show any pulmonary embolism. Consultants:  IP CONSULT TO PULMONOLOGYIP CONSULT TO PHARMACY    Surgeries/procedures Performed:       Treatments:   IV Hydration        Discharge Plan/Disposition:  Home    Hospital/Incidental Findings Requiring Follow Up:    Patient Instructions:    Diet: Regular Diet    Activity:  For number of days (if applicable): Other Instructions:    Provider Follow-Up:   No follow-ups on file.      Significant Diagnostic Studies:    Recent Labs:  Admission on 06/29/2021Sodium                                     Date: 06/29/2021Value: 136         Ref range: 135 - 144 mEq/L    Status: FinalPotassium                                     Date: 06/29/2021Value: 4.2         Ref range: 3.4 - 4.9 mEq/L    Status: FinalChloride                                      Date: 06/29/2021Value: 96          Ref range: 95 - 107 mEq/L     Status: FinalCO2                                           Date: 06/29/2021Value: 30          Ref range: 20 - 31 mEq/L      Status: FinalAnion Gap Date: 06/29/2021Value: 10          Ref range: 9 - 15 mEq/L       Status: FinalGlucose                                       Date: 06/29/2021Value: 129*        Ref range: 70 - 99 mg/dL      Status: FinalBUN                                           Date: 06/29/2021Value: 16          Ref range: 8 - 23 mg/dL       Status: FinalCREATININE                                    Date: 06/29/2021Value: 0.77        Ref range: 0.50 - 0.90 mg/dL  Status: FinalGFR Non-                      Date: 06/29/2021Value: >60.0       Ref range: >60                Status: Final              Comment: >60 mL/min/1.73m2 EGFR, calc. for ages 25 and older using theMDRD formula (not corrected for weight), is valid for stablerenal function. GFR                           Date: 06/29/2021Value: >60.0       Ref range: >60                Status: Final              Comment: >60 mL/min/1.73m2 EGFR, calc. for ages 25 and older using theMDRD formula (not corrected for weight), is valid for stablerenal function. Calcium                                       Date: 06/29/2021Value: 9.0         Ref range: 8.5 - 9.9 mg/dL    Status: FinalTotal Protein                                 Date: 06/29/2021Value: 7.0         Ref range: 6.3 - 8.0 g/dL     Status: FinalAlbumin                                       Date: 06/29/2021Value: 3.9         Ref range: 3.5 - 4.6 g/dL     Status: FinalTotal Bilirubin                               Date: 06/29/2021Value: 0.3         Ref range: 0.2 - 0.7 mg/dL    Status: FinalAlkaline Phosphatase                          Date: 06/29/2021Value: 79          Ref range: 40 - 130 U/L       Status: FinalALT                                           Date: 06/29/2021Value: 31          Ref range: 0 - 33 U/L         Status: FinalAST                                           Date: 06/29/2021Value: 24          Ref range: 0 - 35 U/L         Status: FinalGlobulin                                      Date: 06/29/2021Value: 3.1         Ref range: 2.3 - 3.5 g/dL     Status: [address]                                           Date: 06/29/2021Value: 6.8         Ref range: 4.8 - 10.8 K/uL    Status: FinalRBC                                           Date: 06/29/2021Value: 4.97        Ref range: 4.20 - 5.40 M/uL   Status: FinalHemoglobin                                    Date: 06/29/2021Value: 13.8        Ref range: 12.0 - 16.0 g/dL   Status: FinalHematocrit                                    Date: 06/29/2021Value: 41.3        Ref range: 37.0 - 47.0 %      Status: FinalMCV                                           Date: 06/29/2021Value: 83.1        Ref range: 82.0 - 100.0 fL    Status: [facility]                                           Date: 06/29/2021Value: 27.8        Ref range: 27.0 - 31.3 pg     Status: [address]                                          Date: 06/29/2021Value: 33.4        Ref range: 33.0 - 37.0 %      Status: FinalRDW                                           Date: 06/29/2021Value: 13.5        Ref range: 11.5 - 14.5 %      Status: FinalPlatelets                                     Date: 06/29/2021Value: 283         Ref range: 130 - 400 K/uL     Status: FinalNeutrophils %                                 Date: 06/29/2021Value: 69.3        Ref range: %                  Status: FinalLymphocytes %                                 Date: 06/29/2021Value: 20.5        Ref range: %                  Status: FinalMonocytes %                                   Date: 06/29/2021Value: 9.9         Ref range: %                  Status: FinalEosinophils %                                 Date: 06/29/2021Value: 0.1         Ref range: %                  Status: FinalBasophils %                                   Date: 06/29/2021Value: 0.2         Ref range: %                  Status: FinalNeutrophils Absolute                          Date: 06/29/2021Value: 4.7         Ref range: 1.4 - 6.5 K/uL     Status: FinalLymphocytes Absolute                          Date: 06/29/2021Value: 1.4         Ref range: 1.0 - 4.8 K/uL     Status: FinalMonocytes Absolute                            Date: 06/29/2021Value: 0.7         Ref range: 0.2 - 0.8 K/uL     Status: FinalEosinophils Absolute                          Date: 06/29/2021Value: 0.0         Ref range: 0.0 - 0.7 K/uL     Status: FinalBasophils Absolute                            Date: 06/29/2021Value: 0.0         Ref range: 0.0 - 0.2 K/uL     Status: FinalMagnesium                                     Date: 06/29/2021Value: 2.0         Ref range: 1.7 - 2.4 mg/dL    Status: FinalTroponin                                      Date: 06/29/2021Value: <0.010      Ref range: 0.000 - 0.010 ng*  Status: Final              Comment: Methodology by Troponin T. Color, UA                                     Date: 06/29/2021Value: Yellow      Ref range: Straw/Yellow       Status: FinalClarity, UA                                   Date: 06/29/2021Value: Clear       Ref range: Clear              Status: FinalGlucose, Ur                                   Date: 06/29/2021Value: Negative    Ref range: Negative mg/dL     Status: FinalBilirubin Urine                               Date: 06/29/2021Value: Negative    Ref range: Negative           Status: FinalKetones, Urine                                Date: 06/29/2021Value: Negative    Ref range: Negative mg/dL     Status: FinalSpecific Gravity, UA                          Date: 06/29/2021Value: 1.013       Ref range: 1.005 - 1.030      Status: FinalBlood, Urine                                  Date: 06/29/2021Value: Negative    Ref range: Negative           Status: FinalpH, UA                                        Date: 06/29/2021Value: 5.5         Ref range: 5.0 - 9.0          Status: FinalProtein, UA                                   Date: 06/29/2021Value: TRACE*      Ref range: Negative mg/dL     Status: FinalUrobilinogen, Urine                           Date: 06/29/2021Value: 0.2         Ref range: <2.0 E.U./dL       Status: FinalNitrite, Urine                                Date: 06/29/2021Value: Negative    Ref range: Negative           Status: FinalLeukocyte Esterase, Urine                     Date: 06/29/2021Value: Negative    Ref range: Negative           Status: FinalVentricular Rate                              Date: 06/29/2021Value: 74          Ref range: BPM                Status: FinalAtrial Rate                                   Date: 06/29/2021Value: 74          Ref range: BPM                Status: FinalP-R Interval                                  Date: 06/29/2021Value: 144         Ref range: ms                 Status: FinalQRS Duration                                  Date: 06/29/2021Value: 86          Ref range: ms                 Status: FinalQ-T Interval                                  Date: 06/29/2021Value: 362         Ref range: ms                 Status: FinalQTc Calculation (Bazett)                      Date: 06/29/2021Value: 401         Ref range: ms                 Status: FinalP Axis                                        Date: 06/29/2021Value: 50          Ref range: degrees            Status: FinalR Axis                                        Date: 06/29/2021Value: -19         Ref range: degrees            Status: FinalT Axis                                        Date: 06/29/2021Value: 68          Ref range: degrees            Status: [id number], NAAT                              Date: 06/29/2021Value: DETECTED*   Ref range: Not Detected       Status: Final              Comment: Rapid NAAT:   Negative results should be treated as presumptive and,if inconsistent with clinical signs and symptoms or necessary forpatient management, should be tested with an alternative molecularassay. Negative results do not preclude SARS-CoV-2 infection andshould not be used as the sole basis for patient management decisions. This test has been authorized by the FDA under an Emergency UseAuthorization (EUA) for use by authorized laboratories. Fact sheet for Healthcare [employer]Soundtracker sheet for Patients: Jorgedk: Isothermal Nucleic Acid AmplificationProcalcitonin                                 Date: 06/29/2021Value: 0.11        Ref range: 0.00 - 0.15 ng/mL  Status: Final              Comment: Suspected Sepsis:Low likelihood of sepsis  <.50 ng/mLIncreased likelihood of sepsis 0.50-2.00 ng/mLAntibiotics encouragedHigh risk of sepsis/shock   >2.00 ng/mLAntibiotics strongly encouragedSuspected Lower Respiratory Tract Infections:Low likelihood of bacterial infection  <0.24 ng/mLIncreased likelihood of bacterial infection >0.24 ng/mLAntibiotics encouragedWith successful antibiotic therapy, PCT levels should decreaserapidly. (Half-life of 24 to 36 hours. )Procalcitonin values from samples collected within the first6 hours of systemic infection may still be low. Retesting may be indicated. Values from day 1 and day 4 can be entered into the Change inProcalcitonin Calculator to determine the patient'sMortality Risk http://www.lópez.info/. com)In healthy neonates, plasma Procalcitonin (PCT) concentrationsincrease gradually after birth, reaching peak values at about24 hours of age then decrease to normal values below 0.5                        ng/mLby 48-72 hours of age. WBC                                           Date: 06/30/2021Value: 5.0         Ref range: 4.8 - 10.8 K/uL    Status: FinalRBC                                           Date: 06/30/2021Value: 4.80        Ref range: 4.20 - 5.40 M/uL   Status: FinalHemoglobin                                    Date: 06/30/2021Value: 13.4        Ref range: 12.0 - 16.0 g/dL   Status: FinalHematocrit                                    Date: 06/30/2021Value: 40.8        Ref range: 37.0 - 47.0 %      Status: FinalMCV Date: 06/30/2021Value: 85.0        Ref range: 82.0 - 100.0 fL    Status: [address] [place]                                           Date: 06/30/2021Value: 27.9        Ref range: 27.0 - 31.3 pg     Status: [address]                                          Date: 06/30/2021Value: 32.8*       Ref range: 33.0 - 37.0 %      Status: FinalRDW                                           Date: 06/30/2021Value: 13.8        Ref range: 11.5 - 14.5 %      Status: FinalPlatelets                                     Date: 06/30/2021Value: 313         Ref range: 130 - 400 K/uL     Status: FinalNeutrophils %                                 Date: 06/30/2021Value: 68.2        Ref range: %                  Status: FinalLymphocytes %                                 Date: 06/30/2021Value: 21.9        Ref range: %                  Status: FinalMonocytes %                                   Date: 06/30/2021Value: 9.7         Ref range: %                  Status: FinalEosinophils %                                 Date: 06/30/2021Value: 0.0         Ref range: %                  Status: FinalBasophils %                                   Date: 06/30/2021Value: 0.2         Ref range: %                  Status: FinalNeutrophils Absolute                          Date: 06/30/2021Value: 3.4         Ref range: 1.4 - 6.5 K/uL     Status: FinalLymphocytes Absolute                          Date: 06/30/2021Value: 1.1         Ref range: 1.0 - 4.8 K/uL     Status: FinalMonocytes Absolute                            Date: 06/30/2021Value: 0.5         Ref range: 0.2 - 0.8 K/uL     Status: FinalEosinophils Absolute                          Date: 06/30/2021Value: 0.0         Ref range: 0.0 - 0.7 K/uL     Status: FinalBasophils Absolute                            Date: 06/30/2021Value: 0.0         Ref range: 0.0 - 0.2 K/uL     Status: FinalSodium                                        Date: 06/30/2021Value: 143         Ref range: 135 - 144 mEq/L    Status: FinalPotassium reflex Magnesium                    Date: 06/30/2021Value: 4.4         Ref range: 3.4 - 4.9 mEq/L    Status: FinalChloride                                      Date: 06/30/2021Value: 106         Ref range: 95 - 107 mEq/L     Status: FinalCO2                                           Date: 06/30/2021Value: 27          Ref range: 20 - 31 mEq/L      Status: FinalAnion Gap                                     Date: 06/30/2021Value: 10          Ref range: 9 - 15 mEq/L       Status: FinalGlucose                                       Date: 06/30/2021Value: 172*        Ref range: 70 - 99 mg/dL      Status: FinalBUN                                           Date: 06/30/2021Value: 19          Ref range: 8 - 23 mg/dL       Status: FinalCREATININE                                    Date: 06/30/2021Value: 0.49*       Ref range: 0.50 - 0.90 mg/dL  Status: FinalGFR Non-                      Date: 06/30/2021Value: >60.0       Ref range: >60                Status: Final              Comment: >60 mL/min/1.73m2 EGFR, calc. for ages 25 and older using theMDRD formula (not corrected for weight), is valid for stablerenal function. GFR                           Date: 06/30/2021Value: >60.0       Ref range: >60                Status: Final              Comment: >60 mL/min/1.73m2 EGFR, calc. for ages 25 and older using theMDRD formula (not corrected for weight), is valid for stablerenal function. Calcium                                       Date: 06/30/2021Value: 9.2         Ref range: 8.5 - 9.9 mg/dL    Status: FinalTotal Protein                                 Date: 06/30/2021Value: 6.4         Ref range: 6.3 - 8.0 g/dL     Status: FinalAlbumin                                       Date: 06/30/2021Value: 3.5         Ref range: 3.5 - 4.6 g/dL     Status: FinalTotal Bilirubin                               Date: 06/30/2021Value: <0.2        Ref range: 0.2 - 0.7 mg/dL    Status: FinalAlkaline Phosphatase                          Date: 06/30/2021Value: 71          Ref range: 40 - 130 U/L       Status: FinalALT                                           Date: 06/30/2021Value: 24          Ref range: 0 - 33 U/L         Status: FinalAST                                           Date: 06/30/2021Value: 18          Ref range: 0 - 35 U/L         Status: FinalGlobulin                                      Date: 06/30/2021Value: 2.9         Ref range: 2.3 - 3.5 g/dL     Status: FinalCRP                                           Date: 06/30/2021Value: 51.6*       Ref range: 0.0 - 5.0 mg/L     Status: FinalLD                                            Date: 06/30/2021Value: 191         Ref range: 135 - 214 U/L      Status: FinalFerritin                                      Date: 06/30/2021Value: 381.7*      Ref range: 13.0 - 150.0 ng/*  Status: FinalVit D, 25-Hydroxy                             Date: 06/30/2021Value: 39.2        Ref range: 30.0 - 100.0 ng/*  Status: Final              Comment: ( ng/mL)  Optimum LevelThis assay accurately quantifies the sum of vitamin D3, 25-Hydroxy andvitamin D2, 25-Hyroxy. D-Dimer, Quant                                Date: 06/30/2021Value: 0.53*       Ref range: 0.00 - 0.50 mg/L*  Status: Final              Comment: VTE (DVT or PE) cut-off = 0.50 mg/L FEUFibrinogen                                    Date: 06/30/2021Value: 553.0*      Ref range: 235.0 - 507.0 mg*  Status: [address] [facility]                                           Date: 07/01/2021Value: 8.7         Ref range: 4.8 - 10.8 K/uL    Status: FinalRBC                                           Date: 07/01/2021Value: 4.59        Ref range: 4.20 - 5.40 M/uL   Status: FinalHemoglobin                                    Date: 07/01/2021Value: 12.8        Ref range: 12.0 - 16.0 g/dL   Status: FinalHematocrit                                    Date: 07/01/2021Value: 38.5        Ref range: 37.0 - 47.0 %      Status: [address] Date: 07/01/2021Value: 83.9        Ref range: 82.0 - 100.0 fL    Status: [address] [place]                                           Date: 07/01/2021Value: 27.8        Ref range: 27.0 - 31.3 pg     Status: [address]                                          Date: 07/01/2021Value: 33.2        Ref range: 33.0 - 37.0 %      Status: FinalRDW                                           Date: 07/01/2021Value: 13.8        Ref range: 11.5 - 14.5 %      Status: FinalPlatelets                                     Date: 07/01/2021Value: 354         Ref range: 130 - 400 K/uL     Status: FinalNeutrophils %                                 Date: 07/01/2021Value: 73.4        Ref range: %                  Status: FinalLymphocytes %                                 Date: 07/01/2021Value: 18.1        Ref range: %                  Status: FinalMonocytes %                                   Date: 07/01/2021Value: 8.2         Ref range: %                  Status: FinalEosinophils %                                 Date: 07/01/2021Value: 0.0         Ref range: %                  Status: FinalBasophils %                                   Date: 07/01/2021Value: 0.3         Ref range: %                  Status: FinalNeutrophils Absolute                          Date: 07/01/2021Value: 6.4         Ref range: 1.4 - 6.5 K/uL     Status: FinalLymphocytes Absolute                          Date: 07/01/2021Value: 1.6         Ref range: 1.0 - 4.8 K/uL     Status: FinalMonocytes Absolute                            Date: 07/01/2021Value: 0.7         Ref range: 0.2 - 0.8 K/uL     Status: FinalEosinophils Absolute                          Date: 07/01/2021Value: 0.0         Ref range: 0.0 - 0.7 K/uL     Status: FinalBasophils Absolute                            Date: 07/01/2021Value: 0.0         Ref range: 0.0 - 0.2 K/uL     Status: FinalCRP                                           Date: 07/01/2021Value: 20.7*       Ref range: 0.0 - 5.0 mg/L 07/01/2021Value: 5.9*        Ref range: 6.3 - 8.0 g/dL     Status: FinalAlbumin                                       Date: 07/01/2021Value: 3.2*        Ref range: 3.5 - 4.6 g/dL     Status: FinalTotal Bilirubin                               Date: 07/01/2021Value: <0.2        Ref range: 0.2 - 0.7 mg/dL    Status: FinalAlkaline Phosphatase                          Date: 07/01/2021Value: 60          Ref range: 40 - 130 U/L       Status: FinalALT                                           Date: 07/01/2021Value: 21          Ref range: 0 - 33 U/L         Status: FinalAST                                           Date: 07/01/2021Value: 20          Ref range: 0 - 35 U/L         Status: FinalGlobulin                                      Date: 07/01/2021Value: 2.7         Ref range: 2.3 - 3.5 g/dL     Status: [address]                                           Date: 07/01/2021Value: 0.472       Ref range: 0.440 - 3.860 uI*  Status: Final------------    Radiology last 7 days:  CT Head WO ContrastResult Date: 6/29/2021No acute intracranial process. All CT scans at this facility use dose modulation, iterative reconstruction, and/or weight based dosing when appropriate to reduce radiation dose to as low as reasonably achievable. CTA CHEST W WO CONTRASTResult Date: 6/30/20211. NO FINDINGS CENTRAL, PROXIMAL OR PROXIMAL-SEGMENTAL PORT EMBOLI. 2. SCATTERED PATCHY BILATERAL MULTIFOCAL TO COALESCENT GROUNDGLASS INFILTRATES THROUGHOUT THE LUNG PARENCHYMA IMAGING FEATURES CAN BE SEEN WITH (COVID-19) PNEUMONIA, THOUGH ARE NON SPECIFIC AND CAN OCCUR WITH A VARIETY OF INFECTIOUS AND NONINFECTIOUS PROCESSES. 3. THE THYROID GLAND IS HETEROGENEOUS WITH PROBABLE SMALL MULTIPLE NODULES. CORRELATE CLINICALLY AND FOLLOW-UP. All CT scans at this facility use dose modulation, iterative reconstruction, and/or weight based dosing when appropriate to reduce radiation dose to as low as reasonably achievable.  XR CHEST PORTABLEResult Date: 6/29/2021LEFT LOWER LUNG SUBSEGMENTAL ATELECTASIS/PNEUMONIA IN THIS PARTIALLY EXPIRATORY CHEST RADIOGRAPH.     [unfilled]    Discharge Medications    Current Discharge Medication ListSTART taking these medicationsapixaban (ELIQUIS) 2.5 MG TABS tabletTake 1 tablet by mouth 2 times dailyQty: 60 tablet Refills: 0guaiFENesin (MUCINEX) 600 MG extended release tabletTake 1 tablet by mouth 2 times daily for 15 daysQty: 30 tablet Refills: 0sodium chloride (OCEAN, BABY AYR) 0.65 % nasal spray2 sprays by Nasal route 3 times dailyQty: 1 Bottle Refills: 0Vitamin D (CHOLECALCIFEROL) 50 MCG (2000 UT) TABS tabletTake 1 tablet by mouth dailyQty: 60 tablet Refills: 0Comments: Labeling may look different. 25 mcg=1000 Units. Please double check dosages. dexamethasone (DECADRON) 6 MG tabletTake 1 tablet by mouth daily (with breakfast) for 7 daysQty: 7 tablet Refills: 0docusate sodium (COLACE) 100 MG capsuleTake 1 capsule by mouth 2 times daily for 15 daysQty: 30 capsule Refills: 0    Current Discharge Medication List    Current Discharge Medication ListCONTINUE these medications which have NOT CHANGEDsimvastatin (ZOCOR) 10 MG tabletTAKE ONE TABLET BY MOUTH ONCE DAILY IN THE EVENINGQty: 90 tablet Refills: 3Associated Diagnoses:Pure hypercholesterolemiamometasone (ELOCON) 0.1 % creamApply topically daily. Qty: 50 g Refills: 11Associated Diagnoses:Eczema, unspecified type    Current Discharge Medication ListSTOP taking these medicationsfluticasone (FLONASE) 50 MCG/ACT nasal sprayComments:Reason for Stopping:amLODIPine (NORVASC) 2.5 MG tabletComments:Reason for Stopping:VITAMIN E POComments:Reason for Stopping:Ascorbic Acid (VITAMIN C PO)Comments:Reason for Stopping:    Time Spent on Discharge:E] minutes were spent in patient examination, evaluation, counseling as well as medication reconciliation, prescriptions for required medications, discharge plan, and follow up.     Electronically signed by Robbie Yap MD on 7/1/21 at 1:37 PM EDT   overtime on UT

## 2021-07-01 NOTE — FLOWSHEET NOTE
Pt awake and sitting up to side of bed. Alert and oriented. Respirations even and nonlabored. Pulse ox % on RA. LS clear/diminished. VSS. Afebrile. Tele SR. Pt accepted AM meds without problem. Talked with her daughter on the phone. Dr. Ramirez Garcia was in to see pt.    1310 Discharge instructions reviewed with pt. Understanding verbalized. S/l removed. Dr. Ramirez Garcia was in to see pt. Pt called daughter for ride home. Daughter will be stopping at our pharmacy to  pt meds.

## 2021-07-02 ENCOUNTER — CARE COORDINATION (OUTPATIENT)
Dept: CASE MANAGEMENT | Age: 71
End: 2021-07-02

## 2021-07-02 NOTE — CARE COORDINATION
[facility] Transitions Initial Follow Up Call    Call within 2 business days of discharge: Yes    Patient: Neha Carr Patient : 1950   MRN: [id number]  Reason for Admission: 2021 - 2021 CV-19 PN  Discharge Date: 21 RARS: Readmission Risk Score: 10  CV-19    Last Discharge [facility]       Complaint Diagnosis Description Type Department Provider    21 Fall; Fatigue Fever, unspecified fever cause . .. ED to Hosp-Admission (Discharged) (ADMITTED) Alphonso Daniel MD; Tomeka Simeon MD           Initial CV-19 outreach attempt. Left Hippa compliant VM and appt reminder.     PCP VV  11:15    Care Transitions 24 Hour Call    Care Transitions Interventions         Follow Up  Future Appointments   Date Time Provider Sunil Lee   2021 11:15 AM Shirin Meraz MD [facility] EMERGENCY [facility] CENTER AT [place]   2021 11:15 AM Shirin Meraz MD [facility] [phone], [facility]

## 2021-07-06 ENCOUNTER — CARE COORDINATION (OUTPATIENT)
Dept: CASE MANAGEMENT | Age: 71
End: 2021-07-06

## 2021-07-06 ENCOUNTER — VIRTUAL VISIT (OUTPATIENT)
Dept: PRIMARY CARE CLINIC | Age: 71
End: 2021-07-06
Payer: MEDICARE

## 2021-07-06 ENCOUNTER — TELEPHONE (OUTPATIENT)
Dept: PRIMARY CARE CLINIC | Age: 71
End: 2021-07-06

## 2021-07-06 DIAGNOSIS — M54.2 NECK PAIN: ICD-10-CM

## 2021-07-06 DIAGNOSIS — J18.9 PNEUMONIA OF BOTH LOWER LOBES DUE TO INFECTIOUS ORGANISM: Primary | ICD-10-CM

## 2021-07-06 DIAGNOSIS — U07.1 2019 NOVEL CORONAVIRUS-INFECTED PNEUMONIA (NCIP): ICD-10-CM

## 2021-07-06 DIAGNOSIS — E04.1 THYROID NODULE: ICD-10-CM

## 2021-07-06 DIAGNOSIS — J12.82 2019 NOVEL CORONAVIRUS-INFECTED PNEUMONIA (NCIP): ICD-10-CM

## 2021-07-06 PROCEDURE — 1111F DSCHRG MED/CURRENT MED MERGE: CPT | Performed by: INTERNAL MEDICINE

## 2021-07-06 PROCEDURE — 99495 TRANSJ CARE MGMT MOD F2F 14D: CPT | Performed by: INTERNAL MEDICINE

## 2021-07-06 ASSESSMENT — ENCOUNTER SYMPTOMS
CHEST TIGHTNESS: 1
WHEEZING: 1

## 2021-07-06 NOTE — CARE COORDINATION
[facility] Transitions Initial Follow Up Call    Call within 2 business days of discharge: Yes    Patient: Melonie Banerjee Patient : 1950   MRN: <[id number]>  Reason for Admission:   Discharge Date: 21 RARS: Readmission Risk Score: 10      Last Discharge [facility]       Complaint Diagnosis Description Type Department Provider    21 Fall; Fatigue Fever, unspecified fever cause . .. ED to Hosp-Admission (Discharged) (ADMITTED) Veronika Montes NIDIA Ibeth Chang MD; Patel Toledo MD           Spoke with: [address] Second attempt. No answer. Left HIPPA compliant message to return call to this writer. Final call.     Facility: [address]    Non-face-to-face services provided:  Obtained and reviewed discharge summary and/or continuity of care documents    Care Transitions 24 Hour Call    Care Transitions Interventions         Follow Up  Future Appointments   Date Time Provider Sunil Lee   2021 11:15 AM [facility], [address] [place] [address], [address] [place] [address] Care Transitions Nurse   [phone]

## 2021-07-06 NOTE — PROGRESS NOTES
Doxy  No answer  Phone  Merlene Puckett is a 79 y.o. female evaluated via telephone on 7/6/2021. Merlene Puckett 79 y.o. female presents today with   Chief Complaint   Patient presents with    Follow-Up from [address] [place] [address] Thyroid Problem     CT showed nodules       Pneumonia  She complains of chest tightness, cough and wheezing. This is a new problem. The current episode started 1 to 4 weeks ago. The problem occurs daily. The problem has been rapidly improving. Associated symptoms include myalgias. Pertinent negatives include no appetite change, chest pain or fever. Past Medical History:   Diagnosis Date    Acid reflux     Chronic back pain     Hyperlipidemia     Hypertension     Osteoarthritis     shots in knees by ortho     Patient Active Problem List    Diagnosis Date Noted    Pneumonia due to COVID-19 virus 06/29/2021     Past Surgical History:   Procedure Laterality Date    APPENDECTOMY  1959    BREAST SURGERY      HYSTERECTOMY  1992    [place] COLON [place] SCRN NOT  [address] [facility] N/A 5/9/2018    COLONOSCOPY performed by Eric Glover MD at [address] [place] [address]     No family history on file.   Social History     Socioeconomic History    Marital status:      Spouse name: Not on file    Number of children: Not on file    Years of education: Not on file    Highest education level: Not on file   Occupational History    Not on file   Tobacco Use    Smoking status: Never Smoker    Smokeless tobacco: Never Used   Substance and Sexual Activity    Alcohol use: Yes     Comment: rarely    Drug use: No    Sexual activity: Not on file   Other Topics Concern    Not on file   Social History Narrative    Not on file     Social Determinants of Health     Financial Resource Strain: Low Risk     Difficulty of Paying Living Expenses: Not hard at all   Food Insecurity: No Food Insecurity    Worried About [address] [facility] in the Last Year: Never true    [address] [facility] [address] in the Last Year: Never true   Transportation Needs:     Lack of Transportation (Medical):  Lack of Transportation (Non-Medical):    Physical Activity:     Days of Exercise per Week:     Minutes of Exercise per Session:    Stress:     Feeling of Stress :    Social Connections:     Frequency of Communication with Friends and Family:     Frequency of Social Gatherings with Friends and Family:     Attends Hoahaoism Services:     Active Member of Clubs or Organizations:     Attends Club or Organization Meetings:     Marital Status:    Intimate Partner Violence:     Fear of Current or Ex-Partner:     Emotionally Abused:     Physically Abused:     Sexually Abused: Allergies   Allergen Reactions    Novocain [Procaine]     Shrimp (Diagnostic)      swelling       Review of Systems   Constitutional: Negative for appetite change, chills and fever. HENT: Negative for congestion and facial swelling. Eyes: Negative for photophobia and visual disturbance. Respiratory: Positive for cough and wheezing. Negative for apnea. Cardiovascular: Negative for chest pain and palpitations. Gastrointestinal: Negative for abdominal distention and blood in stool. Genitourinary: Negative for enuresis and hematuria. Musculoskeletal: Positive for arthralgias, myalgias, neck pain and neck stiffness. Negative for gait problem and joint swelling. Skin: Negative for rash. Neurological: Negative for syncope and speech difficulty. Hematological: Does not bruise/bleed easily. Psychiatric/Behavioral: Negative for hallucinations and suicidal ideas. There were no vitals filed for this visit. Physical Exam  Neurological:      Mental Status: She is alert. Assessment/Plan  Luisa Paul was seen today for follow-up from hospital and thyroid problem.     Diagnoses and all orders for this visit:    Pneumonia of both lower lobes due to infectious organism    2019 novel coronavirusinfected pneumonia (NCIP)  - days of discharge: Yes    Patient Active Problem List   Diagnosis    Pneumonia due to COVID-19 virus       Allergies   Allergen Reactions    Novocain [Procaine]     Shrimp (Diagnostic)      swelling       Medications listed as ordered at the time of discharge from hospital      Medications marked \"taking\" at this time  Outpatient Medications Marked as Taking for the 21 encounter (Virtual Visit) with Jostin Storey MD   Medication Sig Dispense Refill    apixaban (ELIQUIS) 2.5 MG TABS tablet Take 1 tablet by mouth 2 times daily 60 tablet 0    guaiFENesin (MUCINEX) 600 MG extended release tablet Take 1 tablet by mouth 2 times daily for 15 days 30 tablet 0    Vitamin D (CHOLECALCIFEROL) 50 MCG ( UT) TABS tablet Take 1 tablet by mouth daily 60 tablet 0    [] dexamethasone (DECADRON) 6 MG tablet Take 1 tablet by mouth daily (with breakfast) for 7 days 7 tablet 0    docusate sodium (COLACE) 100 MG capsule Take 1 capsule by mouth 2 times daily for 15 days 30 capsule 0    simvastatin (ZOCOR) 10 MG tablet TAKE ONE TABLET BY MOUTH ONCE DAILY IN THE EVENING 90 tablet 3    mometasone (ELOCON) 0.1 % cream Apply topically daily. 50 g 11        Medications patient taking as of now reconciled against medications ordered at time of hospital discharge: Yes    Chief Complaint   Patient presents with   [address] from [address] [place] [address] Thyroid Problem     CT showed nodules       History of Present illness - Follow up of Hospital diagnosis(es): pneumnia    Inpatient course: Discharge summary reviewed- see chart. Interval history/Current status: stable    There were no vitals filed for this visit. There is no height or weight on file to calculate BMI.    Wt Readings from Last 3 Encounters:   21 170 lb (77.1 kg)   20 185 lb (83.9 kg)   19 186 lb (84.4 kg)     BP Readings from Last 3 Encounters:   21 (!) 108/53   20 110/70   19 134/70 Assessment/Plan:  1. 2019 novel coronavirusinfected pneumonia (NCIP)    - AK DISCHARGE MEDS RECONCILED W/ CURRENT OUTPATIENT MED LIST    2. Pneumonia of both lower lobes due to infectious organism      3. Thyroid nodule    - US HEAD NECK SOFT TISSUE THYROID; Future    4. Neck pain    - XR CERVICAL SPINE (2-3 VIEWS);  Future        Medical Decision Making: moderate complexity

## 2021-07-06 NOTE — TELEPHONE ENCOUNTER
Pt wants to know if its safe for her  to come home on Friday. She said its been about 10 days since the covid dx, and it was another 4 or 5 prior to that that she felt bad. She said she feels good, just winded from the pneumonia. Please advise.

## 2021-07-12 ENCOUNTER — TELEPHONE (OUTPATIENT)
Dept: PRIMARY CARE CLINIC | Age: 71
End: 2021-07-12

## 2021-07-12 DIAGNOSIS — R05.9 COUGH: Primary | ICD-10-CM

## 2021-07-12 NOTE — TELEPHONE ENCOUNTER
LMOM that her handicap placard is ready. We need to know if she wants this mailed or is going to pick this up?

## 2021-07-12 NOTE — TELEPHONE ENCOUNTER
She said the [facility] will not accept her handicap placard because they said it is a copy? Can you write this on one of your script pads for her?

## 2021-07-12 NOTE — TELEPHONE ENCOUNTER
She said that her daughters employer won't let her return to work unless Nelson Covarrubias gets a covid test. Can you order the covid test for her?

## 2021-07-13 ENCOUNTER — TELEPHONE (OUTPATIENT)
Dept: PRIMARY CARE CLINIC | Age: 71
End: 2021-07-13

## 2021-07-13 ASSESSMENT — ENCOUNTER SYMPTOMS
PHOTOPHOBIA: 0
BLOOD IN STOOL: 0
FACIAL SWELLING: 0
APNEA: 0
COUGH: 1
ABDOMINAL DISTENTION: 0

## 2021-07-13 NOTE — TELEPHONE ENCOUNTER
BP keeps going low, wants to know why  Not on any medication for 2 weeks   BP is 100/60  Fainted a couple of times  Hospital told her not to take the medication because it keeps going low    Patient wants to know should she go off of blood thinners?  She was told not to go off of unless advised by you

## 2021-07-20 ENCOUNTER — TELEPHONE (OUTPATIENT)
Dept: PRIMARY CARE CLINIC | Age: 71
End: 2021-07-20

## 2021-07-20 NOTE — TELEPHONE ENCOUNTER
----- Message from Pj Bergman sent at 7/20/2021  9:39 AM EDT -----  Subject: Appointment Request    Reason for Call: Urgent (Patient Request) No Script    QUESTIONS  Type of Appointment? Established Patient  Reason for appointment request? No appointments available during search  Additional Information for Provider? Patient called stating BP high at   148/85 no appt until August. Patient has a f/u for mammo on 07/22/2021.   ---------------------------------------------------------------------------  --------------  CALL BACK INFO  What is the best way for the office to contact you? OK to leave message on   voicemail  Preferred Call Back Phone Number? [phone]  ---------------------------------------------------------------------------  --------------  SCRIPT ANSWERS  Relationship to Patient? Self  Appointment reason? Symptomatic  Select script based on patient symptoms? Adult No Script  (Is the patient requesting to see the provider for a procedure?)? No  (Is the patient requesting to see the provider urgently  today or   tomorrow. )? Yes  Have you been diagnosed with, awaiting test results for, or told that you   are suspected of having COVID-19 (Coronavirus)? (If patient has tested   negative or was tested as a requirement for work, school, or travel and   not based on symptoms, answer no)? No  Do you currently have flu-like symptoms including fever or chills, cough,   shortness of breath, difficulty breathing, or new loss of taste or smell? No  Have you had close contact with someone with COVID-19 in the last 14 days? No  (Service Expert  click yes below to proceed with [employer] As Usual   Scheduling)?  Yes

## 2021-07-22 ENCOUNTER — OFFICE VISIT (OUTPATIENT)
Dept: PRIMARY CARE CLINIC | Age: 71
End: 2021-07-22
Payer: MEDICARE

## 2021-07-22 VITALS
HEIGHT: 62 IN | WEIGHT: 179.2 LBS | BODY MASS INDEX: 32.97 KG/M2 | OXYGEN SATURATION: 95 % | SYSTOLIC BLOOD PRESSURE: 138 MMHG | DIASTOLIC BLOOD PRESSURE: 87 MMHG | HEART RATE: 74 BPM

## 2021-07-22 DIAGNOSIS — J18.9 PNEUMONIA OF BOTH LOWER LOBES DUE TO INFECTIOUS ORGANISM: Primary | ICD-10-CM

## 2021-07-22 DIAGNOSIS — Z12.31 SCREENING MAMMOGRAM, ENCOUNTER FOR: ICD-10-CM

## 2021-07-22 DIAGNOSIS — E04.1 THYROID NODULE: ICD-10-CM

## 2021-07-22 PROCEDURE — 1123F ACP DISCUSS/DSCN MKR DOCD: CPT | Performed by: INTERNAL MEDICINE

## 2021-07-22 PROCEDURE — 99213 OFFICE O/P EST LOW 20 MIN: CPT | Performed by: INTERNAL MEDICINE

## 2021-07-22 PROCEDURE — 1111F DSCHRG MED/CURRENT MED MERGE: CPT | Performed by: INTERNAL MEDICINE

## 2021-07-22 PROCEDURE — G8427 DOCREV CUR MEDS BY ELIG CLIN: HCPCS | Performed by: INTERNAL MEDICINE

## 2021-07-22 PROCEDURE — G8399 PT W/DXA RESULTS DOCUMENT: HCPCS | Performed by: INTERNAL MEDICINE

## 2021-07-22 PROCEDURE — 4040F PNEUMOC VAC/ADMIN/RCVD: CPT | Performed by: INTERNAL MEDICINE

## 2021-07-22 PROCEDURE — G8417 CALC BMI ABV UP PARAM F/U: HCPCS | Performed by: INTERNAL MEDICINE

## 2021-07-22 PROCEDURE — 3017F COLORECTAL CA SCREEN DOC REV: CPT | Performed by: INTERNAL MEDICINE

## 2021-07-22 PROCEDURE — 1090F PRES/ABSN URINE INCON ASSESS: CPT | Performed by: INTERNAL MEDICINE

## 2021-07-22 PROCEDURE — 1036F TOBACCO NON-USER: CPT | Performed by: INTERNAL MEDICINE

## 2021-07-22 ASSESSMENT — ENCOUNTER SYMPTOMS
BLOOD IN STOOL: 0
CHOKING: 0
FACIAL SWELLING: 0
APNEA: 0
ABDOMINAL DISTENTION: 0
COUGH: 0
PHOTOPHOBIA: 0

## 2021-07-22 NOTE — PROGRESS NOTES
[facility] 79 y.o. female presents today with   Chief Complaint   Patient presents with    Results     follow up on mammogram results        HPI bp drop. then back up. On qd pills now. Got covid pneumonia. Past Medical History:   Diagnosis Date    Acid reflux     Chronic back pain     Hyperlipidemia     Hypertension     Osteoarthritis     shots in knees by ortho     Patient Active Problem List    Diagnosis Date Noted    Pneumonia due to COVID-19 virus 06/29/2021     Past Surgical History:   Procedure Laterality Date    APPENDECTOMY  1959    BREAST SURGERY      HYSTERECTOMY  1992    [place] COLON [place] SCRN NOT  [address] IND N/A 5/9/2018    COLONOSCOPY performed by Alicja Harmon MD at [facility]     No family history on file. Social History     Socioeconomic History    Marital status:      Spouse name: None    Number of children: None    Years of education: None    Highest education level: None   Occupational History    None   Tobacco Use    Smoking status: Never Smoker    Smokeless tobacco: Never Used   Substance and Sexual Activity    Alcohol use: Yes     Comment: rarely    Drug use: No    Sexual activity: None   Other Topics Concern    None   Social History Narrative    None     Social Determinants of Health     Financial Resource Strain: Low Risk     Difficulty of Paying Living Expenses: Not hard at all   Food Insecurity: No Food Insecurity    Worried About Running Out of Food in the Last Year: Never true    [address] in the Last Year: Never true   Transportation Needs:     Lack of Transportation (Medical):      Lack of Transportation (Non-Medical):    Physical Activity:     Days of Exercise per Week:     Minutes of Exercise per Session:    Stress:     Feeling of Stress :    Social Connections:     Frequency of Communication with Friends and Family:     Frequency of Social Gatherings with Friends and Family:     Attends Mormonism Services:     Active Member of Clubs or Organizations:     Attends Club or Organization Meetings:     Marital Status:    Intimate Partner Violence:     Fear of Current or Ex-Partner:     Emotionally Abused:     Physically Abused:     Sexually Abused: Allergies   Allergen Reactions    Novocain [Procaine]     Shrimp (Diagnostic)      swelling       Review of Systems   Constitutional: Negative for chills and fever. HENT: Negative for facial swelling and nosebleeds. Eyes: Negative for photophobia and visual disturbance. Respiratory: Negative for apnea, cough and choking. Cardiovascular: Negative for chest pain and palpitations. Gastrointestinal: Negative for abdominal distention and blood in stool. Genitourinary: Negative for enuresis and hematuria. Musculoskeletal: Negative for gait problem and joint swelling. Skin: Negative for rash. Neurological: Negative for syncope and speech difficulty. Hematological: Does not bruise/bleed easily. Psychiatric/Behavioral: Negative for hallucinations and suicidal ideas. Vitals:    07/22/21 1357 07/22/21 1400   BP: (!) 142/62 138/87   Pulse: 74    SpO2: 95%    Weight: 179 lb 3.2 oz (81.3 kg)    Height: 5' 2\" (1.575 m)        Physical Exam  Constitutional:       Appearance: She is well-developed. HENT:      Head: Normocephalic and atraumatic. Eyes:      Pupils: Pupils are equal, round, and reactive to light. Cardiovascular:      Rate and Rhythm: Normal rate and regular rhythm. Heart sounds: Normal heart sounds. Pulmonary:      Effort: No respiratory distress. Breath sounds: Normal breath sounds. Abdominal:      General: Bowel sounds are normal. There is no distension. Musculoskeletal:         General: Normal range of motion. Cervical back: Normal range of motion. Skin:     Coloration: Skin is not jaundiced. Neurological:      Mental Status: She is alert and oriented to person, place, and time.       Cranial Nerves: No cranial nerve deficit. Psychiatric:         Mood and Affect: Mood normal.     Assessment/Plan  Lenora was seen today for results. Diagnoses and all orders for this visit:    Pneumonia of both lower lobes due to infectious organism    Screening mammogram, encounter for  -     [facility] DIGITAL SCREEN W OR WO CAD BILATERAL; Future    Thyroid nodule  -     Rupesh Reyes MD, Endocrinology, [facility]        Return in about 3 months (around 10/22/2021), or if symptoms worsen or fail to improve.     Marilee Reno MD

## 2021-07-27 ENCOUNTER — TELEPHONE (OUTPATIENT)
Dept: PRIMARY CARE CLINIC | Age: 71
End: 2021-07-27

## 2021-08-12 ENCOUNTER — HOSPITAL ENCOUNTER (OUTPATIENT)
Dept: ULTRASOUND IMAGING | Age: 71
Discharge: HOME | End: 2021-08-14
Payer: MEDICARE

## 2021-08-12 ENCOUNTER — HOSPITAL ENCOUNTER (OUTPATIENT)
Dept: GENERAL RADIOLOGY | Age: 71
Discharge: HOME | End: 2021-08-14
Payer: MEDICARE

## 2021-08-12 DIAGNOSIS — M54.2 NECK PAIN: ICD-10-CM

## 2021-08-12 DIAGNOSIS — E04.1 THYROID NODULE: ICD-10-CM

## 2021-08-12 PROCEDURE — 76536 US EXAM OF HEAD AND NECK: CPT

## 2021-08-12 PROCEDURE — 72050 X-RAY EXAM NECK SPINE 4/5VWS: CPT

## 2021-08-31 DIAGNOSIS — I10 ESSENTIAL HYPERTENSION: ICD-10-CM

## 2021-08-31 RX ORDER — AMLODIPINE BESYLATE 2.5 MG/1
2.5 TABLET ORAL DAILY
Qty: 90 TABLET | Refills: 3 | Status: SHIPPED | OUTPATIENT
Start: 2021-08-31 | End: 2022-08-24 | Stop reason: SDUPTHER

## 2021-09-03 ENCOUNTER — OFFICE VISIT (OUTPATIENT)
Dept: PRIMARY CARE CLINIC | Age: 71
End: 2021-09-03
Payer: MEDICARE

## 2021-09-03 VITALS
RESPIRATION RATE: 18 BRPM | DIASTOLIC BLOOD PRESSURE: 64 MMHG | OXYGEN SATURATION: 96 % | HEART RATE: 75 BPM | WEIGHT: 185.2 LBS | BODY MASS INDEX: 34.08 KG/M2 | TEMPERATURE: 96.7 F | SYSTOLIC BLOOD PRESSURE: 132 MMHG | HEIGHT: 62 IN

## 2021-09-03 DIAGNOSIS — G47.00 INSOMNIA, UNSPECIFIED TYPE: ICD-10-CM

## 2021-09-03 DIAGNOSIS — M54.2 NECK PAIN: ICD-10-CM

## 2021-09-03 DIAGNOSIS — L21.9 SEBORRHEIC DERMATITIS: Primary | ICD-10-CM

## 2021-09-03 DIAGNOSIS — R22.0 SCALP LUMP: ICD-10-CM

## 2021-09-03 PROCEDURE — 99214 OFFICE O/P EST MOD 30 MIN: CPT | Performed by: INTERNAL MEDICINE

## 2021-09-03 PROCEDURE — 4040F PNEUMOC VAC/ADMIN/RCVD: CPT | Performed by: INTERNAL MEDICINE

## 2021-09-03 PROCEDURE — G8427 DOCREV CUR MEDS BY ELIG CLIN: HCPCS | Performed by: INTERNAL MEDICINE

## 2021-09-03 PROCEDURE — 1090F PRES/ABSN URINE INCON ASSESS: CPT | Performed by: INTERNAL MEDICINE

## 2021-09-03 PROCEDURE — 1036F TOBACCO NON-USER: CPT | Performed by: INTERNAL MEDICINE

## 2021-09-03 PROCEDURE — 3017F COLORECTAL CA SCREEN DOC REV: CPT | Performed by: INTERNAL MEDICINE

## 2021-09-03 PROCEDURE — 1123F ACP DISCUSS/DSCN MKR DOCD: CPT | Performed by: INTERNAL MEDICINE

## 2021-09-03 PROCEDURE — G8399 PT W/DXA RESULTS DOCUMENT: HCPCS | Performed by: INTERNAL MEDICINE

## 2021-09-03 PROCEDURE — G8417 CALC BMI ABV UP PARAM F/U: HCPCS | Performed by: INTERNAL MEDICINE

## 2021-09-03 RX ORDER — TRAZODONE HYDROCHLORIDE 50 MG/1
TABLET ORAL
Qty: 30 TABLET | Refills: 1 | Status: SHIPPED | OUTPATIENT
Start: 2021-09-03

## 2021-09-03 RX ORDER — NEOMYCIN SULFATE, POLYMYXIN B SULFATE AND DEXAMETHASONE 3.5; 10000; 1 MG/ML; [USP'U]/ML; MG/ML
SUSPENSION/ DROPS OPHTHALMIC
COMMUNITY
Start: 2021-08-25

## 2021-09-03 RX ORDER — KETOCONAZOLE 20 MG/G
CREAM TOPICAL
Qty: 1 EACH | Refills: 3 | Status: SHIPPED | OUTPATIENT
Start: 2021-09-03 | End: 2021-12-20 | Stop reason: SDUPTHER

## 2021-09-03 RX ORDER — IBUPROFEN 800 MG/1
800 TABLET ORAL 2 TIMES DAILY PRN
Qty: 60 TABLET | Refills: 5 | Status: SHIPPED | OUTPATIENT
Start: 2021-09-03 | End: 2022-06-28 | Stop reason: SDUPTHER

## 2021-09-03 RX ORDER — KETOROLAC TROMETHAMINE 5 MG/ML
SOLUTION OPHTHALMIC
COMMUNITY
Start: 2021-08-25

## 2021-09-03 RX ORDER — KETOCONAZOLE 20 MG/ML
SHAMPOO TOPICAL
Qty: 1 EACH | Refills: 3 | Status: SHIPPED | OUTPATIENT
Start: 2021-09-03 | End: 2021-12-20 | Stop reason: SDUPTHER

## 2021-09-03 ASSESSMENT — ENCOUNTER SYMPTOMS
SHORTNESS OF BREATH: 0
SINUS PRESSURE: 1
ABDOMINAL PAIN: 0
VOMITING: 0
DIARRHEA: 0
NAUSEA: 0
COUGH: 0
WHEEZING: 0
SORE THROAT: 1
COLOR CHANGE: 1
SINUS PAIN: 0
RHINORRHEA: 0

## 2021-09-03 NOTE — PROGRESS NOTES
Subjective:      Patient ID: Lupis Perea is a 79 y.o. female    Scalp growth and flaking x many yrs    Seasonal allergy f/u  Joint pain f/u  HPI  Pt presents with many years of left scalp flaking but no pain. No improvement with previous prescriptions. Noticed a lump on the left side of the head 10 month ago, and has since increased in size. Difficulty staying asleep persists despite Ambien. Bedroom is dark and quiet. Itchy throat, no sore throat, no fever or chills. No SOB but attests to sinus congestion, relieved with Claritin D. Requests refill of ibuprofen for neck pain. Past Medical History:   Diagnosis Date    Acid reflux     Chronic back pain     Hyperlipidemia     Hypertension     Osteoarthritis     shots in knees by ortho     Past Surgical History:   Procedure Laterality Date    APPENDECTOMY  1959    BREAST SURGERY      HYSTERECTOMY  1992    [place] COLON [place] SCRN NOT  [address] IND N/A 5/9/2018    COLONOSCOPY performed by An Vigil MD at [address] History     Socioeconomic History    Marital status:      Spouse name: Not on file    Number of children: Not on file    Years of education: Not on file    Highest education level: Not on file   Occupational History    Not on file   Tobacco Use    Smoking status: Never Smoker    Smokeless tobacco: Never Used   Substance and Sexual Activity    Alcohol use: Yes     Comment: rarely    Drug use: No    Sexual activity: Not on file   Other Topics Concern    Not on file   Social History Narrative    Not on file     Social Determinants of Health     Financial Resource Strain: Low Risk     Difficulty of Paying Living Expenses: Not hard at all   Food Insecurity: No Food Insecurity    Worried About [address] in the Last Year: Never true    [address] [facility] [facility] [address] in the Last Year: Never true   Transportation Needs:     Lack of Transportation (Medical):      Lack of Transportation (Non-Medical):    Physical Activity:     Days of Exercise per Week:     Minutes of Exercise per Session:    Stress:     Feeling of Stress :    Social Connections:     Frequency of Communication with Friends and Family:     Frequency of Social Gatherings with Friends and Family:     Attends Scientology Services:     Active Member of Clubs or Organizations:     Attends Club or Organization Meetings:     Marital Status:    Intimate Partner Violence:     Fear of Current or Ex-Partner:     Emotionally Abused:     Physically Abused:     Sexually Abused:      History reviewed. No pertinent family history. Allergies:  Novocain [procaine] and Shrimp (diagnostic)  Patient Active Problem List   Diagnosis    Pneumonia due to COVID-19 virus     Current Outpatient Medications on File Prior to Visit   Medication Sig Dispense Refill    ketorolac (ACULAR) 0.5 % ophthalmic solution USE 1 DROP IN THE OPERATIVE EYE TWICE DAILY      neomycin-polymyxin-dexameth (MAXITROL) 3.5-64249-6.1 ophthalmic suspension INSTILL 1 DROP IN THE OPERATIVE EYE EVERY DAY      amLODIPine (NORVASC) 2.5 MG tablet Take 1 tablet by mouth daily 90 tablet 3    sodium chloride (OCEAN, BABY AYR) 0.65 % nasal spray 2 sprays by Nasal route 3 times daily 1 Bottle 0    Vitamin D (CHOLECALCIFEROL) 50 MCG (2000 UT) TABS tablet Take 1 tablet by mouth daily 60 tablet 0    simvastatin (ZOCOR) 10 MG tablet TAKE ONE TABLET BY MOUTH ONCE DAILY IN THE EVENING 90 tablet 3    mometasone (ELOCON) 0.1 % cream Apply topically daily. (Patient not taking: Reported on 9/3/2021) 50 g 11     No current facility-administered medications on file prior to visit. Review of Systems   Constitutional: Negative for chills, diaphoresis, fatigue and fever. HENT: Positive for sinus pressure and sore throat. Negative for congestion, ear discharge, ear pain, rhinorrhea, sinus pain and sneezing. Respiratory: Negative for cough, shortness of breath and wheezing. Cardiovascular: Negative for chest pain. Gastrointestinal: Negative for abdominal pain, diarrhea, nausea and vomiting. Endocrine: Negative for cold intolerance and heat intolerance. Genitourinary: Negative for dysuria and frequency. Musculoskeletal: Positive for arthralgias. Skin: Positive for color change and rash. Neurological: Negative for dizziness and light-headedness. Psychiatric/Behavioral: Positive for sleep disturbance. Negative for dysphoric mood and suicidal ideas. The patient is not nervous/anxious. Objective:   /64   Pulse 75   Temp 96.7 °F (35.9 °C)   Resp 18   Ht 5' 2\" (1.575 m)   Wt 185 lb 3.2 oz (84 kg)   SpO2 96%   BMI 33.87 kg/m²     Physical Exam  Constitutional:       General: She is not in acute distress. Appearance: She is not diaphoretic. HENT:      Head:      Comments: Left parietoccipital erythema with exfoliation, but no TTP  Left parietal hard nontender mobile subcutaneous 1x1cm lump  Cardiovascular:      Rate and Rhythm: Normal rate and regular rhythm. Pulses: Normal pulses. Heart sounds: Normal heart sounds, S1 normal and S2 normal. No murmur heard. Pulmonary:      Effort: Pulmonary effort is normal. No respiratory distress. Breath sounds: Normal breath sounds. No wheezing or rales. Chest:      Chest wall: No tenderness. Abdominal:      General: Bowel sounds are normal.      Tenderness: There is no abdominal tenderness. Neurological:      General: No focal deficit present. Mental Status: She is alert. Cranial Nerves: No cranial nerve deficit. Psychiatric:         Mood and Affect: Mood normal.         Thought Content: Thought content normal.       Assessment:       Diagnosis Orders   1. Seborrheic dermatitis  ketoconazole (NIZORAL) 2 % cream    ketoconazole (NIZORAL) 2 % shampoo   2. Scalp lump  JILLIAN - Imelda Johnson MD, Dermatology, [facility]    ?? basal cell carcinoma   3.  Insomnia, unspecified type  traZODone (DESYREL) 50 MG tablet   4. Neck pain       Plan:      Orders Placed This Encounter   Procedures   Rosamaria Lucas MD, Dermatology, [facility]     Referral Priority:   Routine     Referral Type:   Eval and Treat     Referral Reason:   Specialty Services Required     Referred to Provider:   Gael Brunson DO     Requested Specialty:   Dermatology     Number of Visits Requested:   1     Orders Placed This Encounter   Medications    ketoconazole (NIZORAL) 2 % cream     Sig: Apply topically twice daily for at least 4 weeks (or for 1 week after lesions have healed). Dispense:  1 each     Refill:  3    ketoconazole (NIZORAL) 2 % shampoo     Sig: Use 3 times weekly for 2 weeks. Dispense:  1 each     Refill:  3    traZODone (DESYREL) 50 MG tablet     Sig: Take half tablet every night as needed. If no improvement after 1 week, increase to 1 whole tablet     Dispense:  30 tablet     Refill:  1    ibuprofen (ADVIL;MOTRIN) 800 MG tablet     Sig: Take 1 tablet by mouth 2 times daily as needed for Pain     Dispense:  60 tablet     Refill:  5     Return in about 1 month (around 10/3/2021) for follow up.

## 2021-09-07 ENCOUNTER — HOSPITAL ENCOUNTER (OUTPATIENT)
Dept: WOMENS IMAGING | Age: 71
Discharge: HOME | End: 2021-09-09
Payer: MEDICARE

## 2021-09-07 VITALS — BODY MASS INDEX: 33.87 KG/M2 | HEIGHT: 62 IN

## 2021-09-07 DIAGNOSIS — Z12.31 SCREENING MAMMOGRAM, ENCOUNTER FOR: ICD-10-CM

## 2021-09-07 PROCEDURE — 77067 SCR MAMMO BI INCL CAD: CPT

## 2021-09-08 DIAGNOSIS — Z00.00 HEALTH CARE MAINTENANCE: Primary | ICD-10-CM

## 2021-09-20 ENCOUNTER — OFFICE VISIT (OUTPATIENT)
Dept: PRIMARY CARE CLINIC | Age: 71
End: 2021-09-20
Payer: MEDICARE

## 2021-09-20 VITALS
OXYGEN SATURATION: 98 % | HEART RATE: 74 BPM | SYSTOLIC BLOOD PRESSURE: 138 MMHG | HEIGHT: 62 IN | BODY MASS INDEX: 34.23 KG/M2 | DIASTOLIC BLOOD PRESSURE: 66 MMHG | WEIGHT: 186 LBS | TEMPERATURE: 97.1 F

## 2021-09-20 DIAGNOSIS — R73.9 HYPERGLYCEMIA: ICD-10-CM

## 2021-09-20 DIAGNOSIS — E78.00 PURE HYPERCHOLESTEROLEMIA: ICD-10-CM

## 2021-09-20 DIAGNOSIS — Z00.00 ROUTINE GENERAL MEDICAL EXAMINATION AT A HEALTH CARE FACILITY: Primary | ICD-10-CM

## 2021-09-20 DIAGNOSIS — Z23 NEED FOR INFLUENZA VACCINATION: ICD-10-CM

## 2021-09-20 DIAGNOSIS — Z23 NEED FOR PNEUMOCOCCAL VACCINATION: ICD-10-CM

## 2021-09-20 PROCEDURE — 90732 PPSV23 VACC 2 YRS+ SUBQ/IM: CPT | Performed by: INTERNAL MEDICINE

## 2021-09-20 PROCEDURE — 4040F PNEUMOC VAC/ADMIN/RCVD: CPT | Performed by: INTERNAL MEDICINE

## 2021-09-20 PROCEDURE — G0008 ADMIN INFLUENZA VIRUS VAC: HCPCS | Performed by: INTERNAL MEDICINE

## 2021-09-20 PROCEDURE — 90694 VACC AIIV4 NO PRSRV 0.5ML IM: CPT | Performed by: INTERNAL MEDICINE

## 2021-09-20 PROCEDURE — 3017F COLORECTAL CA SCREEN DOC REV: CPT | Performed by: INTERNAL MEDICINE

## 2021-09-20 PROCEDURE — G0439 PPPS, SUBSEQ VISIT: HCPCS | Performed by: INTERNAL MEDICINE

## 2021-09-20 PROCEDURE — 1123F ACP DISCUSS/DSCN MKR DOCD: CPT | Performed by: INTERNAL MEDICINE

## 2021-09-20 PROCEDURE — G0009 ADMIN PNEUMOCOCCAL VACCINE: HCPCS | Performed by: INTERNAL MEDICINE

## 2021-09-20 RX ORDER — SIMVASTATIN 10 MG
TABLET ORAL
Qty: 90 TABLET | Refills: 3 | Status: SHIPPED | OUTPATIENT
Start: 2021-09-20 | End: 2021-10-05 | Stop reason: SDUPTHER

## 2021-09-20 SDOH — ECONOMIC STABILITY: TRANSPORTATION INSECURITY
IN THE PAST 12 MONTHS, HAS THE LACK OF TRANSPORTATION KEPT YOU FROM MEDICAL APPOINTMENTS OR FROM GETTING MEDICATIONS?: NO

## 2021-09-20 SDOH — ECONOMIC STABILITY: FOOD INSECURITY: WITHIN THE PAST 12 MONTHS, THE FOOD YOU BOUGHT JUST DIDN'T LAST AND YOU DIDN'T HAVE MONEY TO GET MORE.: NEVER TRUE

## 2021-09-20 SDOH — ECONOMIC STABILITY: TRANSPORTATION INSECURITY
IN THE PAST 12 MONTHS, HAS LACK OF TRANSPORTATION KEPT YOU FROM MEETINGS, WORK, OR FROM GETTING THINGS NEEDED FOR DAILY LIVING?: NO

## 2021-09-20 SDOH — ECONOMIC STABILITY: FOOD INSECURITY: WITHIN THE PAST 12 MONTHS, YOU WORRIED THAT YOUR FOOD WOULD RUN OUT BEFORE YOU GOT MONEY TO BUY MORE.: NEVER TRUE

## 2021-09-20 ASSESSMENT — LIFESTYLE VARIABLES
HOW OFTEN DO YOU HAVE A DRINK CONTAINING ALCOHOL: NEVER
HOW OFTEN DO YOU HAVE A DRINK CONTAINING ALCOHOL: 0
AUDIT TOTAL SCORE: INCOMPLETE
AUDIT-C TOTAL SCORE: INCOMPLETE

## 2021-09-20 ASSESSMENT — PATIENT HEALTH QUESTIONNAIRE - PHQ9
SUM OF ALL RESPONSES TO PHQ QUESTIONS 1-9: 0
SUM OF ALL RESPONSES TO PHQ QUESTIONS 1-9: 6
1. LITTLE INTEREST OR PLEASURE IN DOING THINGS: 0
SUM OF ALL RESPONSES TO PHQ9 QUESTIONS 1 & 2: 3
4. FEELING TIRED OR HAVING LITTLE ENERGY: 0
SUM OF ALL RESPONSES TO PHQ QUESTIONS 1-9: 6
1. LITTLE INTEREST OR PLEASURE IN DOING THINGS: 3
SUM OF ALL RESPONSES TO PHQ QUESTIONS 1-9: 0
SUM OF ALL RESPONSES TO PHQ QUESTIONS 1-9: 6
8. MOVING OR SPEAKING SO SLOWLY THAT OTHER PEOPLE COULD HAVE NOTICED. OR THE OPPOSITE, BEING SO FIGETY OR RESTLESS THAT YOU HAVE BEEN MOVING AROUND A LOT MORE THAN USUAL: 0
6. FEELING BAD ABOUT YOURSELF - OR THAT YOU ARE A FAILURE OR HAVE LET YOURSELF OR YOUR FAMILY DOWN: 0
7. TROUBLE CONCENTRATING ON THINGS, SUCH AS READING THE NEWSPAPER OR WATCHING TELEVISION: 3
2. FEELING DOWN, DEPRESSED OR HOPELESS: 0
9. THOUGHTS THAT YOU WOULD BE BETTER OFF DEAD, OR OF HURTING YOURSELF: 0
2. FEELING DOWN, DEPRESSED OR HOPELESS: 0
5. POOR APPETITE OR OVEREATING: 0
SUM OF ALL RESPONSES TO PHQ QUESTIONS 1-9: 0
SUM OF ALL RESPONSES TO PHQ9 QUESTIONS 1 & 2: 0

## 2021-09-20 ASSESSMENT — SOCIAL DETERMINANTS OF HEALTH (SDOH): HOW HARD IS IT FOR YOU TO PAY FOR THE VERY BASICS LIKE FOOD, HOUSING, MEDICAL CARE, AND HEATING?: NOT HARD AT ALL

## 2021-09-20 NOTE — PROGRESS NOTES
Makayla Sensor 79 y.o. female presents today with   Chief Complaint   Patient presents with    Medicare AWV       HPIannual wellness visit  Ate 4 hours ago. Past Medical History:   Diagnosis Date    Acid reflux     Chronic back pain     Hyperlipidemia     Hypertension     Osteoarthritis     shots in knees by ortho     Patient Active Problem List    Diagnosis Date Noted    Pneumonia due to COVID-19 virus 06/29/2021     Past Surgical History:   Procedure Laterality Date    APPENDECTOMY  1959    BREAST BIOPSY      BREAST SURGERY      HYSTERECTOMY  1992    OVARY REMOVAL      OR COLON CA SCRN NOT  [address] IND N/A 5/9/2018    COLONOSCOPY performed by Sheba Rodgers MD at [address] [facility]     No family history on file. Social History     Socioeconomic History    Marital status:      Spouse name: None    Number of children: None    Years of education: None    Highest education level: None   Occupational History    None   Tobacco Use    Smoking status: Never Smoker    Smokeless tobacco: Never Used   Substance and Sexual Activity    Alcohol use: Yes     Comment: rarely    Drug use: No    Sexual activity: None   Other Topics Concern    None   Social History Narrative    None     Social Determinants of Health     Financial Resource Strain: Low Risk     Difficulty of Paying Living Expenses: Not hard at all   Food Insecurity: No Food Insecurity    Worried About Running Out of Food in the Last Year: Never true    Jj of Food in the Last Year: Never true   Transportation Needs: No Transportation Needs    Lack of Transportation (Medical): No    Lack of Transportation (Non-Medical):  No   Physical Activity:     Days of Exercise per Week:     Minutes of Exercise per Session:    Stress:     Feeling of Stress :    Social Connections:     Frequency of Communication with Friends and Family:     Frequency of Social Gatherings with Friends and Family:     Attends [facility] Wellness Visit  Name: Edna Cobian Date: 2021   MRN: [id number] Sex: Female   Age: 79 y.o. Ethnicity: Non- / Non    : 1950 Race: White (non-)      Xiao Mirza is here for Medicare AWV    Screenings for behavioral, psychosocial and functional/safety risks, and cognitive dysfunction are all negative except as indicated below. These results, as well as other patient data from the [address] form, are documented in Flowsheets linked to this Encounter. Allergies   Allergen Reactions    Novocain [Procaine]     Shrimp (Diagnostic)      swelling         Prior to Visit Medications    Medication Sig Taking? Authorizing Provider   Ascorbic Acid (VITAMIN C GUMMIE) 120 MG CHEW Take by mouth Yes Historical Provider, MD   simvastatin (ZOCOR) 10 MG tablet TAKE ONE TABLET BY MOUTH ONCE DAILY IN THE EVENING Yes Ellen Wright MD   ketorolac (ACULAR) 0.5 % ophthalmic solution USE 1 DROP IN THE OPERATIVE EYE TWICE DAILY Yes Historical Provider, MD   neomycin-polymyxin-dexameth (MAXITROL) 3.5-89266-9.1 ophthalmic suspension INSTILL 1 DROP IN THE OPERATIVE EYE EVERY DAY Yes Historical Provider, MD   ketoconazole (NIZORAL) 2 % cream Apply topically twice daily for at least 4 weeks (or for 1 week after lesions have healed). Yes Nenita Gustafson MD   ketoconazole (NIZORAL) 2 % shampoo Use 3 times weekly for 2 weeks. Yes Nenita Gustafson MD   traZODone (DESYREL) 50 MG tablet Take half tablet every night as needed.  If no improvement after 1 week, increase to 1 whole tablet Yes Nenita Gustafson MD   ibuprofen (ADVIL;MOTRIN) 800 MG tablet Take 1 tablet by mouth 2 times daily as needed for Pain Yes Nenita Gustafson MD   amLODIPine (NORVASC) 2.5 MG tablet Take 1 tablet by mouth daily Yes Ellen Wright MD   sodium chloride (OCEAN, BABY AYR) 0.65 % nasal spray 2 sprays by Nasal route 3 times daily Yes Zakia Carmona MD   Vitamin D (CHOLECALCIFEROL) 50 MCG (2000 UT) TABS tablet Take 1 tablet by mouth daily Yes Suly Garcia MD   mometasone (ELOCON) 0.1 % cream Apply topically daily. Yes Marilee Reno MD         Past Medical History:   Diagnosis Date    Acid reflux     Chronic back pain     Hyperlipidemia     Hypertension     Osteoarthritis     shots in knees by ortho       Past Surgical History:   Procedure Laterality Date    APPENDECTOMY  1959    BREAST BIOPSY      BREAST SURGERY      HYSTERECTOMY  1992    OVARY REMOVAL      [place] COLON CA SCRN NOT  [address] IND N/A 5/9/2018    COLONOSCOPY performed by Eric Glover MD at [address] [facility]       No family history on file. CareTeam (Including outside providers/suppliers regularly involved in providing care):   Patient Care Team:  Marilee Reno MD as PCP - General (Internal Medicine)  Marilee Reno MD as PCP - [facility] Empaneled Provider    Wt Readings from Last 3 Encounters:   09/20/21 186 lb (84.4 kg)   09/03/21 185 lb 3.2 oz (84 kg)   07/22/21 179 lb 3.2 oz (81.3 kg)     Vitals:    09/20/21 1106   BP: 138/66   Site: Left Upper Arm   Cuff Size: Large Adult   Pulse: 74   Temp: 97.1 °F (36.2 °C)   SpO2: 98%   Weight: 186 lb (84.4 kg)   Height: 5' 2\" (1.575 m)     Body mass index is 34.02 kg/m². Based upon direct observation of the patient, evaluation of cognition reveals recent and remote memory intact. Patient's complete Health Risk Assessment and screening values have been reviewed and are found in Flowsheets. The following problems were reviewed today and where indicated follow up appointments were made and/or referrals ordered.     Positive Risk Factor Screenings with Interventions:     Fall Risk:  2 or more falls in past year?: (!) yes  Fall with injury in past year?: (!) yes  Fall Risk Interventions:    · discussed     Depression:  PHQ-2 Score: 3  PHQ-9 Total Score: 6    Severity:1-4 = minimal depression, 5-9 = mild depression, 10-14 = moderate depression, 15-19 = moderately severe depression, 20-27 = severe depression  Depression Interventions:        General Health and ACP:  General  In general, how would you say your health is?: Very Good  In the past 7 days, have you experienced any of the following?  New or Increased Pain, New or Increased Fatigue, Loneliness, Social Isolation, Stress or Anger?: (!) New or Increased Fatigue, Social Isolation, Anger  Do you get the social and emotional support that you need?: Yes  Do you have a Living Will?: Yes  Advance Directives     Power of  Living Will ACP-Advance Directive ACP-Power of     Not on File Not on File Not on File Not on [address] Habits/Nutrition:  Health Habits/Nutrition  Do you exercise for at least 20 minutes 2-3 times per week?: (!) No  Have you lost any weight without trying in the past 3 months?: No  Do you eat only one meal per day?: No  Have you seen the dentist within the past year?: (!) No  Body mass index: (!) 34.02  Health Habits/Nutrition Interventions:  · discussed    Hearing/Vision:  No exam data present  Hearing/Vision  Do you or your family notice any trouble with your hearing that hasn't been managed with hearing aids?: No  Do you have difficulty driving, watching TV, or doing any of your daily activities because of your eyesight?: (!) Yes  Have you had an eye exam within the past year?: Yes  Hearing/Vision Interventions:discussed      Personalized Preventive Plan   Current Health Maintenance Status  Immunization History   Administered Date(s) Administered    COVID-19, Pfizer, PF, 30mcg/0.3mL 09/03/2021    Influenza Vaccine, unspecified formulation 10/31/2016    Influenza, High Dose (Fluzone 65 yrs and older) 10/31/2016, 11/03/2017, 10/25/2018, 09/12/2019    Influenza, Quadv, adjuvanted, 65 yrs +, IM, PF (Fluad) 09/20/2021    Pneumococcal Conjugate 13-valent ([id number]) 04/18/2017    Pneumococcal Polysaccharide ([id number]) 04/18/2018, 09/20/2021        Health Maintenance   Topic Date Due    DTaP/Tdap/Td vaccine (1 - Tdap) Never done    Shingles Vaccine (1 of 2) Never done   [employer] Visit (AWV)  Never done    COVID-19 Vaccine (2 - Pfizer 2-dose series) 09/24/2021    A1C test (Diabetic or Prediabetic)  09/20/2022    Lipid screen  09/20/2022    Colon cancer screen colonoscopy  05/09/2023    Breast cancer screen  09/07/2023    DEXA (modify frequency per FRAX score)  Completed    Flu vaccine  Completed    Pneumococcal 65+ years Vaccine  Completed    Hepatitis C screen  Completed    Hepatitis A vaccine  Aged Out    Hepatitis B vaccine  Aged Out    Hib vaccine  Aged Out    Meningococcal (ACWY) vaccine  Aged Out     Recommendations for [employer] Due: see orders and patient instructions/AVS.  . Recommended screening schedule for the next 5-10 years is provided to the patient in written form: see Patient Michelle Velázquez was seen today for medicare awv. Diagnoses and all orders for this visit:    Routine general medical examination at a health care facility    Need for pneumococcal vaccination  -     PNEUMOVAX 23 subcutaneous/IM (Pneumococcal polysaccharide vaccine 23-valent >= 1yo)    Need for influenza vaccination  -     INFLUENZA, QUADV, ADJUVANTED, 65 YRS =, IM, PF, PREFILL SYR, 0.5ML (FLUAD)    Pure hypercholesterolemia  -     simvastatin (ZOCOR) 10 MG tablet; TAKE ONE TABLET BY MOUTH ONCE DAILY IN THE EVENING  -     Lipid Panel;  Future    Hyperglycemia  -     Hemoglobin A1C; Future

## 2021-09-24 DIAGNOSIS — E78.00 PURE HYPERCHOLESTEROLEMIA: ICD-10-CM

## 2021-09-24 RX ORDER — SIMVASTATIN 10 MG
TABLET ORAL
Qty: 90 TABLET | Refills: 3 | Status: CANCELLED | OUTPATIENT
Start: 2021-09-24

## 2021-09-26 ASSESSMENT — ENCOUNTER SYMPTOMS
APNEA: 0
CHOKING: 0
PHOTOPHOBIA: 0
BLOOD IN STOOL: 0
FACIAL SWELLING: 0
ABDOMINAL DISTENTION: 0

## 2021-09-27 NOTE — PATIENT INSTRUCTIONS
Personalized Preventive Plan for Williams Man - 9/20/2021  Medicare offers a range of preventive health benefits. Some of the tests and screenings are paid in full while other may be subject to a deductible, co-insurance, and/or copay. Some of these benefits include a comprehensive review of your medical history including lifestyle, illnesses that may run in your family, and various assessments and screenings as appropriate. After reviewing your medical record and screening and assessments performed today your provider may have ordered immunizations, labs, imaging, and/or referrals for you. A list of these orders (if applicable) as well as your Preventive Care list are included within your After Visit Summary for your review. Other Preventive Recommendations:    · A preventive eye exam performed by an eye specialist is recommended every 1-2 years to screen for glaucoma; cataracts, macular degeneration, and other eye disorders. · A preventive dental visit is recommended every 6 months. · Try to get at least 150 minutes of exercise per week or 10,000 steps per day on a pedometer . · Order or download the FREE \"Exercise & Physical Activity: Your Everyday Guide\" from [employer]. Call 2-737.258.1477 or search [employer] online. · You need 7699-1991 mg of calcium and 2876-4363 IU of vitamin D per day. It is possible to meet your calcium requirement with diet alone, but a vitamin D supplement is usually necessary to meet this goal.  · When exposed to the sun, use a sunscreen that protects against both UVA and UVB radiation with an SPF of 30 or greater. Reapply every 2 to 3 hours or after sweating, drying off with a towel, or swimming. · Always wear a seat belt when traveling in a car. Always wear a helmet when riding a bicycle or motorcycle.

## 2021-10-05 DIAGNOSIS — E78.00 PURE HYPERCHOLESTEROLEMIA: ICD-10-CM

## 2021-10-05 RX ORDER — SIMVASTATIN 10 MG
TABLET ORAL
Qty: 90 TABLET | Refills: 3 | Status: SHIPPED | OUTPATIENT
Start: 2021-10-05

## 2021-10-12 ENCOUNTER — OFFICE VISIT (OUTPATIENT)
Dept: PRIMARY CARE CLINIC | Age: 71
End: 2021-10-12
Payer: MEDICARE

## 2021-10-12 VITALS
BODY MASS INDEX: 34.41 KG/M2 | HEART RATE: 68 BPM | OXYGEN SATURATION: 97 % | TEMPERATURE: 97.9 F | HEIGHT: 62 IN | SYSTOLIC BLOOD PRESSURE: 104 MMHG | DIASTOLIC BLOOD PRESSURE: 68 MMHG | WEIGHT: 187 LBS

## 2021-10-12 DIAGNOSIS — L30.9 ECZEMA, UNSPECIFIED TYPE: Primary | ICD-10-CM

## 2021-10-12 PROCEDURE — 1123F ACP DISCUSS/DSCN MKR DOCD: CPT | Performed by: INTERNAL MEDICINE

## 2021-10-12 PROCEDURE — 1090F PRES/ABSN URINE INCON ASSESS: CPT | Performed by: INTERNAL MEDICINE

## 2021-10-12 PROCEDURE — G8417 CALC BMI ABV UP PARAM F/U: HCPCS | Performed by: INTERNAL MEDICINE

## 2021-10-12 PROCEDURE — G8399 PT W/DXA RESULTS DOCUMENT: HCPCS | Performed by: INTERNAL MEDICINE

## 2021-10-12 PROCEDURE — 4040F PNEUMOC VAC/ADMIN/RCVD: CPT | Performed by: INTERNAL MEDICINE

## 2021-10-12 PROCEDURE — G8484 FLU IMMUNIZE NO ADMIN: HCPCS | Performed by: INTERNAL MEDICINE

## 2021-10-12 PROCEDURE — 1036F TOBACCO NON-USER: CPT | Performed by: INTERNAL MEDICINE

## 2021-10-12 PROCEDURE — 3017F COLORECTAL CA SCREEN DOC REV: CPT | Performed by: INTERNAL MEDICINE

## 2021-10-12 PROCEDURE — G8427 DOCREV CUR MEDS BY ELIG CLIN: HCPCS | Performed by: INTERNAL MEDICINE

## 2021-10-12 PROCEDURE — 99213 OFFICE O/P EST LOW 20 MIN: CPT | Performed by: INTERNAL MEDICINE

## 2021-10-12 RX ORDER — CLOBETASOL PROPIONATE 0.5 MG/G
CREAM TOPICAL
Qty: 60 G | Refills: 5 | Status: SHIPPED | OUTPATIENT
Start: 2021-10-12 | End: 2021-12-20 | Stop reason: SDUPTHER

## 2021-10-12 ASSESSMENT — ENCOUNTER SYMPTOMS
BLOOD IN STOOL: 0
PHOTOPHOBIA: 0
CHOKING: 0
APNEA: 0
ABDOMINAL DISTENTION: 0
FACIAL SWELLING: 0

## 2021-10-12 NOTE — PROGRESS NOTES
Romero Hands 79 y.o. female presents today with   Chief Complaint   Patient presents with    Hair/Scalp Problem     Using medicated shampoo with oil and scalp still the same    Fall     loss of balance       HPI vertigo acting up after covid, gone now. Scalp scaly    Past Medical History:   Diagnosis Date    Acid reflux     Chronic back pain     Hyperlipidemia     Hypertension     Osteoarthritis     shots in knees by ortho     Patient Active Problem List    Diagnosis Date Noted    Pneumonia due to COVID-19 virus 06/29/2021     Past Surgical History:   Procedure Laterality Date    APPENDECTOMY  1959    BREAST BIOPSY      BREAST SURGERY      HYSTERECTOMY  1992    OVARY REMOVAL      [place] COLON CA SCRN NOT  [address] IND N/A 5/9/2018    COLONOSCOPY performed by Ariel Choudhury MD at [address] [facility]     No family history on file. Social History     Socioeconomic History    Marital status:      Spouse name: None    Number of children: None    Years of education: None    Highest education level: None   Occupational History    None   Tobacco Use    Smoking status: Never Smoker    Smokeless tobacco: Never Used   Substance and Sexual Activity    Alcohol use: Yes     Comment: rarely    Drug use: No    Sexual activity: None   Other Topics Concern    None   Social History Narrative    None     Social Determinants of Health     Financial Resource Strain: Low Risk     Difficulty of Paying Living Expenses: Not hard at all   Food Insecurity: No Food Insecurity    Worried About Running Out of Food in the Last Year: Never true    Jj of Food in the Last Year: Never true   Transportation Needs: No Transportation Needs    Lack of Transportation (Medical): No    Lack of Transportation (Non-Medical):  No   Physical Activity:     Days of Exercise per Week:     Minutes of Exercise per Session:    Stress:     Feeling of Stress :    Social Connections:     Frequency of Communication with Friends and Family:     Frequency of Social Gatherings with Friends and Family:     Attends Mandaeism Services:     Active Member of Clubs or Organizations:     Attends Club or Organization Meetings:     Marital Status:    Intimate Partner Violence:     Fear of Current or Ex-Partner:     Emotionally Abused:     Physically Abused:     Sexually Abused: Allergies   Allergen Reactions    Novocain [Procaine]     Shrimp (Diagnostic)      swelling       Review of Systems   Constitutional: Negative for chills and fever. HENT: Negative for facial swelling and nosebleeds. Eyes: Negative for photophobia and visual disturbance. Respiratory: Negative for apnea and choking. Cardiovascular: Negative for chest pain and palpitations. Gastrointestinal: Negative for abdominal distention and blood in stool. Genitourinary: Negative for enuresis and hematuria. Musculoskeletal: Negative for gait problem and joint swelling. Skin: Negative for rash. Neurological: Negative for syncope and speech difficulty. Hematological: Does not bruise/bleed easily. Psychiatric/Behavioral: Negative for hallucinations and suicidal ideas. Vitals:    10/12/21 1041   BP: 104/68   Site: Right Upper Arm   Cuff Size: Large Adult   Pulse: 68   Temp: 97.9 °F (36.6 °C)   SpO2: 97%   Weight: 187 lb (84.8 kg)   Height: 5' 2\" (1.575 m)       Physical Exam  Constitutional:       Appearance: She is well-developed. HENT:      Head: Normocephalic and atraumatic. Eyes:      Pupils: Pupils are equal, round, and reactive to light. Cardiovascular:      Rate and Rhythm: Normal rate and regular rhythm. Heart sounds: Normal heart sounds. Pulmonary:      Breath sounds: Normal breath sounds. Abdominal:      General: Bowel sounds are normal. There is no distension. Tenderness: There is no abdominal tenderness. Musculoskeletal:         General: Normal range of motion.       Cervical back: Normal range of motion and neck supple. Skin:     Findings: Erythema and rash present. Neurological:      Mental Status: She is oriented to person, place, and time. Cranial Nerves: No cranial nerve deficit. Assessment/Plan  Adin Lopez was seen today for hair/scalp problem and fall. Diagnoses and all orders for this visit:    Eczema, unspecified type  -     clobetasol (TEMOVATE) 0.05 % cream; Apply topically 2 times daily. Return in about 6 months (around 4/12/2022), or if symptoms worsen or fail to improve.     Bere Ro MD

## 2021-12-01 ENCOUNTER — TELEPHONE (OUTPATIENT)
Dept: PRIMARY CARE CLINIC | Age: 71
End: 2021-12-01

## 2021-12-01 NOTE — TELEPHONE ENCOUNTER
Needs a refill on her Blood Pressure medication that you prescribed to her (not what Dr. Clayton Guidry gave her, that you prescribed her, was the lowest dose 10 mg)  [facility] in [facility]

## 2021-12-20 ENCOUNTER — OFFICE VISIT (OUTPATIENT)
Dept: PRIMARY CARE CLINIC | Age: 71
End: 2021-12-20
Payer: MEDICARE

## 2021-12-20 VITALS — HEIGHT: 62 IN | TEMPERATURE: 97.9 F | BODY MASS INDEX: 34.96 KG/M2 | WEIGHT: 190 LBS

## 2021-12-20 DIAGNOSIS — L30.9 ECZEMA, UNSPECIFIED TYPE: ICD-10-CM

## 2021-12-20 DIAGNOSIS — E53.8 VITAMIN B12 DEFICIENCY: ICD-10-CM

## 2021-12-20 DIAGNOSIS — L21.9 SEBORRHEIC DERMATITIS: Primary | ICD-10-CM

## 2021-12-20 DIAGNOSIS — Z91.81 AT HIGH RISK FOR FALLS: ICD-10-CM

## 2021-12-20 PROCEDURE — 96372 THER/PROPH/DIAG INJ SC/IM: CPT | Performed by: INTERNAL MEDICINE

## 2021-12-20 PROCEDURE — 1123F ACP DISCUSS/DSCN MKR DOCD: CPT | Performed by: INTERNAL MEDICINE

## 2021-12-20 PROCEDURE — G8399 PT W/DXA RESULTS DOCUMENT: HCPCS | Performed by: INTERNAL MEDICINE

## 2021-12-20 PROCEDURE — 4040F PNEUMOC VAC/ADMIN/RCVD: CPT | Performed by: INTERNAL MEDICINE

## 2021-12-20 PROCEDURE — G8427 DOCREV CUR MEDS BY ELIG CLIN: HCPCS | Performed by: INTERNAL MEDICINE

## 2021-12-20 PROCEDURE — 1036F TOBACCO NON-USER: CPT | Performed by: INTERNAL MEDICINE

## 2021-12-20 PROCEDURE — G8484 FLU IMMUNIZE NO ADMIN: HCPCS | Performed by: INTERNAL MEDICINE

## 2021-12-20 PROCEDURE — 99213 OFFICE O/P EST LOW 20 MIN: CPT | Performed by: INTERNAL MEDICINE

## 2021-12-20 PROCEDURE — 3017F COLORECTAL CA SCREEN DOC REV: CPT | Performed by: INTERNAL MEDICINE

## 2021-12-20 PROCEDURE — 1090F PRES/ABSN URINE INCON ASSESS: CPT | Performed by: INTERNAL MEDICINE

## 2021-12-20 PROCEDURE — G8417 CALC BMI ABV UP PARAM F/U: HCPCS | Performed by: INTERNAL MEDICINE

## 2021-12-20 RX ORDER — CYANOCOBALAMIN 1000 UG/ML
1000 INJECTION INTRAMUSCULAR; SUBCUTANEOUS ONCE
Status: COMPLETED | OUTPATIENT
Start: 2021-12-20 | End: 2021-12-20

## 2021-12-20 RX ORDER — KETOCONAZOLE 20 MG/ML
SHAMPOO TOPICAL
Qty: 1 EACH | Refills: 5 | Status: SHIPPED | OUTPATIENT
Start: 2021-12-20 | End: 2021-12-20 | Stop reason: SDUPTHER

## 2021-12-20 RX ORDER — KETOCONAZOLE 20 MG/G
CREAM TOPICAL
Qty: 1 EACH | Refills: 5 | Status: SHIPPED | OUTPATIENT
Start: 2021-12-20

## 2021-12-20 RX ORDER — CLOBETASOL PROPIONATE 0.5 MG/G
CREAM TOPICAL
Qty: 60 G | Refills: 5 | Status: SHIPPED | OUTPATIENT
Start: 2021-12-20 | End: 2021-12-20 | Stop reason: SDUPTHER

## 2021-12-20 RX ORDER — CLOBETASOL PROPIONATE 0.5 MG/G
CREAM TOPICAL
Qty: 60 G | Refills: 5 | Status: SHIPPED | OUTPATIENT
Start: 2021-12-20

## 2021-12-20 RX ORDER — KETOCONAZOLE 20 MG/ML
SHAMPOO TOPICAL
Qty: 1 EACH | Refills: 5 | Status: SHIPPED | OUTPATIENT
Start: 2021-12-20 | End: 2022-02-10 | Stop reason: SDUPTHER

## 2021-12-20 RX ORDER — KETOCONAZOLE 20 MG/G
CREAM TOPICAL
Qty: 1 EACH | Refills: 5 | Status: SHIPPED | OUTPATIENT
Start: 2021-12-20 | End: 2021-12-20 | Stop reason: SDUPTHER

## 2021-12-20 RX ADMIN — CYANOCOBALAMIN 1000 MCG: 1000 INJECTION INTRAMUSCULAR; SUBCUTANEOUS at 15:15

## 2021-12-20 ASSESSMENT — ENCOUNTER SYMPTOMS
PHOTOPHOBIA: 0
BLOOD IN STOOL: 0
APNEA: 0
ABDOMINAL DISTENTION: 0
COUGH: 0
FACIAL SWELLING: 0

## 2021-12-20 NOTE — PROGRESS NOTES
Henrietta Domingo 70 y.o. female presents today with   Chief Complaint   Patient presents with    3 Month Follow-Up    Hair/Scalp Problem     requesting RX for ketoconazole foam send to [facility]       Rash  This is a recurrent problem. The current episode started more than 1 year ago. The problem has been waxing and waning since onset. The affected locations include the scalp. The rash is characterized by scaling, dryness and itchiness. Pertinent negatives include no anorexia, cough or fever. Past Medical History:   Diagnosis Date    Acid reflux     Chronic back pain     COVID-19 06/2021    Hyperlipidemia     Hypertension     Osteoarthritis     shots in knees by ortho     Patient Active Problem List    Diagnosis Date Noted    Pneumonia due to COVID-19 virus 06/29/2021     Past Surgical History:   Procedure Laterality Date    APPENDECTOMY  1959    BREAST BIOPSY      BREAST SURGERY      HYSTERECTOMY  1992    OVARY REMOVAL      [place] COLON CA SCRN NOT  [address] [facility] N/A 5/9/2018    COLONOSCOPY performed by Praful Hopkins MD at [address] [facility]     No family history on file.   Social History     Socioeconomic History    Marital status:      Spouse name: None    Number of children: None    Years of education: None    Highest education level: None   Occupational History    None   Tobacco Use    Smoking status: Never Smoker    Smokeless tobacco: Never Used   Substance and Sexual Activity    Alcohol use: Yes     Comment: rarely    Drug use: No    Sexual activity: None   Other Topics Concern    None   Social History Narrative    None     Social Determinants of Health     Financial Resource Strain: Low Risk     Difficulty of Paying Living Expenses: Not hard at all   Food Insecurity: No Food Insecurity    Worried About Running Out of Food in the Last Year: Never true    Jj of Food in the Last Year: Never true   Transportation Needs: No Transportation Needs    Lack of Head: Normocephalic and atraumatic. Eyes:      Pupils: Pupils are equal, round, and reactive to light. Cardiovascular:      Rate and Rhythm: Normal rate and regular rhythm. Heart sounds: Normal heart sounds. Pulmonary:      Effort: No respiratory distress. Breath sounds: Normal breath sounds. Abdominal:      General: Bowel sounds are normal. There is no distension. Musculoskeletal:         General: Normal range of motion. Cervical back: Normal range of motion. Skin:     Findings: Erythema (scalp postier and left side spots) present. Neurological:      Mental Status: She is alert and oriented to person, place, and time. Cranial Nerves: No cranial nerve deficit. Psychiatric:         Mood and Affect: Mood normal.        Assessment/Plan  Lenora was seen today for 3 month follow-up and hair/scalp problem. Diagnoses and all orders for this visit:    Seborrheic dermatitis  -     Discontinue: ketoconazole (NIZORAL) 2 % cream; Apply topically twice daily for at least 4 weeks (or for 1 week after lesions have healed). -     Discontinue: ketoconazole (NIZORAL) 2 % shampoo; Use 3 times weekly for 2 weeks. -     ketoconazole (NIZORAL) 2 % cream; Apply topically twice daily for at least 4 weeks (or for 1 week after lesions have healed). -     ketoconazole (NIZORAL) 2 % shampoo; Use 3 times weekly for 2 weeks. Eczema, unspecified type  -     Discontinue: clobetasol (TEMOVATE) 0.05 % cream; Apply topically 2 times daily. -     clobetasol (TEMOVATE) 0.05 % cream; Apply topically 2 times daily. At high risk for falls    Vitamin B12 deficiency  -     cyanocobalamin injection 1,000 mcg        Return in about 1 year (around 12/20/2022), or if symptoms worsen or fail to improve.     Lorrin Kayser, MD  On the basis of positive falls risk screening, assessment and plan is as follows: discussed

## 2022-02-10 ENCOUNTER — OFFICE VISIT (OUTPATIENT)
Dept: PRIMARY CARE CLINIC | Age: 72
End: 2022-02-10
Payer: MEDICARE

## 2022-02-10 VITALS
TEMPERATURE: 98.2 F | BODY MASS INDEX: 36.29 KG/M2 | SYSTOLIC BLOOD PRESSURE: 110 MMHG | DIASTOLIC BLOOD PRESSURE: 62 MMHG | OXYGEN SATURATION: 99 % | WEIGHT: 197.2 LBS | HEART RATE: 72 BPM | HEIGHT: 62 IN

## 2022-02-10 DIAGNOSIS — K21.9 GASTROESOPHAGEAL REFLUX DISEASE WITHOUT ESOPHAGITIS: Primary | ICD-10-CM

## 2022-02-10 DIAGNOSIS — L21.9 SEBORRHEIC DERMATITIS: ICD-10-CM

## 2022-02-10 DIAGNOSIS — U07.1 PNEUMONIA DUE TO COVID-19 VIRUS: ICD-10-CM

## 2022-02-10 DIAGNOSIS — J12.82 PNEUMONIA DUE TO COVID-19 VIRUS: ICD-10-CM

## 2022-02-10 PROCEDURE — G8484 FLU IMMUNIZE NO ADMIN: HCPCS | Performed by: INTERNAL MEDICINE

## 2022-02-10 PROCEDURE — 99214 OFFICE O/P EST MOD 30 MIN: CPT | Performed by: INTERNAL MEDICINE

## 2022-02-10 PROCEDURE — 1036F TOBACCO NON-USER: CPT | Performed by: INTERNAL MEDICINE

## 2022-02-10 PROCEDURE — 1123F ACP DISCUSS/DSCN MKR DOCD: CPT | Performed by: INTERNAL MEDICINE

## 2022-02-10 PROCEDURE — G8417 CALC BMI ABV UP PARAM F/U: HCPCS | Performed by: INTERNAL MEDICINE

## 2022-02-10 PROCEDURE — 1090F PRES/ABSN URINE INCON ASSESS: CPT | Performed by: INTERNAL MEDICINE

## 2022-02-10 PROCEDURE — G8427 DOCREV CUR MEDS BY ELIG CLIN: HCPCS | Performed by: INTERNAL MEDICINE

## 2022-02-10 PROCEDURE — 4040F PNEUMOC VAC/ADMIN/RCVD: CPT | Performed by: INTERNAL MEDICINE

## 2022-02-10 PROCEDURE — 3017F COLORECTAL CA SCREEN DOC REV: CPT | Performed by: INTERNAL MEDICINE

## 2022-02-10 PROCEDURE — G8399 PT W/DXA RESULTS DOCUMENT: HCPCS | Performed by: INTERNAL MEDICINE

## 2022-02-10 RX ORDER — KETOCONAZOLE 20 MG/ML
SHAMPOO TOPICAL
Qty: 1 EACH | Refills: 5 | Status: SHIPPED | OUTPATIENT
Start: 2022-02-10 | End: 2022-06-28 | Stop reason: SDUPTHER

## 2022-02-10 RX ORDER — PANTOPRAZOLE SODIUM 40 MG/1
40 TABLET, DELAYED RELEASE ORAL DAILY
Qty: 30 TABLET | Refills: 5 | Status: SHIPPED | OUTPATIENT
Start: 2022-02-10 | End: 2023-02-10

## 2022-02-10 NOTE — PROGRESS NOTES
Mike Leon 70 y.o. female presents today with   Chief Complaint   Patient presents with    Pneumonia    Gastroesophageal Reflux       Gastroesophageal Reflux  She complains of heartburn. This is a recurrent problem. The current episode started more than 1 year ago. The problem occurs frequently. The problem has been waxing and waning. She has tried a PPI for the symptoms. Rash  This is a recurrent problem. The current episode started more than 1 year ago. The problem has been waxing and waning since onset. The affected locations include the scalp. The rash is characterized by dryness, itchiness and scaling.    had pneumonia, had covid in June, no more symptoms    Past Medical History:   Diagnosis Date    Acid reflux     Chronic back pain     COVID-19 06/2021    Hyperlipidemia     Hypertension     Osteoarthritis     shots in knees by ortho     Patient Active Problem List    Diagnosis Date Noted    Pneumonia due to COVID-19 virus 06/29/2021     Past Surgical History:   Procedure Laterality Date    APPENDECTOMY  1959    BREAST BIOPSY      BREAST SURGERY      HYSTERECTOMY  1992    OVARY REMOVAL      [place] COLON CA SCRN NOT  [address] IND N/A 5/9/2018    COLONOSCOPY performed by Eleanor Lesches, MD at [address] [place] [address]     No family history on file.   Social History     Socioeconomic History    Marital status:      Spouse name: None    Number of children: None    Years of education: None    Highest education level: None   Occupational History    None   Tobacco Use    Smoking status: Never Smoker    Smokeless tobacco: Never Used   Substance and Sexual Activity    Alcohol use: Yes     Comment: rarely    Drug use: No    Sexual activity: None   Other Topics Concern    None   Social History Narrative    None     Social Determinants of Health     Financial Resource Strain: Low Risk     Difficulty of Paying Living Expenses: Not hard at all   Food Insecurity: No Food Insecurity    Worried About [facility] in the Last Year: Never true    Jj of Food in the Last Year: Never true   Transportation Needs: No Transportation Needs    Lack of Transportation (Medical): No    Lack of Transportation (Non-Medical): No   Physical Activity:     Days of Exercise per Week: Not on file    Minutes of Exercise per Session: Not on file   Stress:     Feeling of Stress : Not on file   Social Connections:     Frequency of Communication with Friends and Family: Not on file    Frequency of Social Gatherings with Friends and Family: Not on file    Attends Mandaeism Services: Not on file    Active Member of [address] [place] [address] or Organizations: Not on file    Attends Club or Organization Meetings: Not on file    Marital Status: Not on file   Intimate Partner Violence:     Fear of Current or Ex-Partner: Not on file    Emotionally Abused: Not on file    Physically Abused: Not on file    Sexually Abused: Not on file   Housing Stability:     Unable to Pay for Housing in the Last Year: Not on file    Number of [place] in the Last Year: Not on file    Unstable Housing in the Last Year: Not on file     Allergies   Allergen Reactions    Novocain [Procaine]     Shrimp (Diagnostic)      swelling       Review of Systems   Gastrointestinal: Positive for heartburn. Skin: Positive for rash. Vitals:    02/10/22 1052   BP: 110/62   Site: Left Upper Arm   Position: Sitting   Cuff Size: Large Adult   Pulse: 72   Temp: 98.2 °F (36.8 °C)   SpO2: 99%   Weight: 197 lb 3.2 oz (89.4 kg)   Height: 5' 2\" (1.575 m)       Physical Exam   Assessment/Plan  Luis Fernando Whittaker was seen today for pneumonia and gastroesophageal reflux. Diagnoses and all orders for this visit:    Gastroesophageal reflux disease without esophagitis  -     pantoprazole (PROTONIX) 40 MG tablet; Take 1 tablet by mouth daily    Seborrheic dermatitis  -     ketoconazole (NIZORAL) 2 % shampoo; Use 3 times weekly for 2 weeks.     Pneumonia due to COVID-19 virus        No follow-ups on file.     Melinda Ramos MD

## 2022-02-20 ASSESSMENT — ENCOUNTER SYMPTOMS: HEARTBURN: 1

## 2022-06-25 ENCOUNTER — HOSPITAL ENCOUNTER (EMERGENCY)
Age: 72
Discharge: HOME | End: 2022-06-26
Attending: EMERGENCY MEDICINE
Payer: MEDICARE

## 2022-06-25 ENCOUNTER — APPOINTMENT (OUTPATIENT)
Dept: CT IMAGING | Age: 72
End: 2022-06-25
Payer: MEDICARE

## 2022-06-25 DIAGNOSIS — S09.90XA INJURY OF HEAD, INITIAL ENCOUNTER: ICD-10-CM

## 2022-06-25 DIAGNOSIS — S01.111S EYEBROW LACERATION, RIGHT, SEQUELA: Primary | ICD-10-CM

## 2022-06-25 PROCEDURE — 70486 CT MAXILLOFACIAL W/O DYE: CPT

## 2022-06-25 PROCEDURE — 6370000000 HC RX 637 (ALT 250 FOR IP): Performed by: EMERGENCY MEDICINE

## 2022-06-25 PROCEDURE — 90715 TDAP VACCINE 7 YRS/> IM: CPT | Performed by: EMERGENCY MEDICINE

## 2022-06-25 PROCEDURE — 72125 CT NECK SPINE W/O DYE: CPT

## 2022-06-25 PROCEDURE — 99284 EMERGENCY DEPT VISIT MOD MDM: CPT

## 2022-06-25 PROCEDURE — 90471 IMMUNIZATION ADMIN: CPT | Performed by: EMERGENCY MEDICINE

## 2022-06-25 PROCEDURE — 70450 CT HEAD/BRAIN W/O DYE: CPT

## 2022-06-25 PROCEDURE — 6360000002 HC RX W HCPCS: Performed by: EMERGENCY MEDICINE

## 2022-06-25 RX ADMIN — Medication 3 ML: at 23:20

## 2022-06-25 RX ADMIN — TETANUS TOXOID, REDUCED DIPHTHERIA TOXOID AND ACELLULAR PERTUSSIS VACCINE, ADSORBED 0.5 ML: 5; 2.5; 8; 8; 2.5 SUSPENSION INTRAMUSCULAR at 23:19

## 2022-06-25 ASSESSMENT — PAIN SCALES - GENERAL: PAINLEVEL_OUTOF10: 6

## 2022-06-25 ASSESSMENT — PAIN DESCRIPTION - ORIENTATION: ORIENTATION: RIGHT

## 2022-06-25 ASSESSMENT — PAIN DESCRIPTION - PAIN TYPE: TYPE: ACUTE PAIN

## 2022-06-25 ASSESSMENT — PAIN DESCRIPTION - DESCRIPTORS: DESCRIPTORS: DULL

## 2022-06-25 ASSESSMENT — PAIN DESCRIPTION - LOCATION: LOCATION: OTHER (COMMENT)

## 2022-06-25 ASSESSMENT — PAIN - FUNCTIONAL ASSESSMENT: PAIN_FUNCTIONAL_ASSESSMENT: 0-10

## 2022-06-26 VITALS
HEIGHT: 62 IN | DIASTOLIC BLOOD PRESSURE: 74 MMHG | OXYGEN SATURATION: 95 % | HEART RATE: 70 BPM | WEIGHT: 180 LBS | SYSTOLIC BLOOD PRESSURE: 145 MMHG | TEMPERATURE: 98 F | BODY MASS INDEX: 33.13 KG/M2 | RESPIRATION RATE: 16 BRPM

## 2022-06-26 NOTE — ED NOTES
6 sutures inserted by Dr. Devon Boas. This RN at bedside to assist. Pt tolerated well. New dressing applied.       Valerie Dickinson RN  06/26/22 1174

## 2022-06-26 NOTE — ED PROVIDER NOTES
eMERGENCY dEPARTMENT eNCOUnter      [address] 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 Cut right eye brawl, denies  loss of consiousness. HPI    Xiao Mirza is a 70 y.o. female with hx of osteoarthritis, acid reflux hyperlipidemia, hypertension who presentsto ED from home  By private car  With complaint of fall, right eyebrow laceration  Onset just prior to arrival  Intensity of symptoms mild  Patient was landscaping and tripped and fallen landing on hard surface. Patient did not lose consciousness denies any headache or focal neurologic deficit. Patient does not take blood thinners. She is current laceration to right eyebrow with without control of bleeding. Patient denies any visual changes. Patient's tetanus is not up-to-date. PAST MEDICAL HISTORY    Past Medical History:   Diagnosis Date    Acid reflux     Chronic back pain     COVID-19 06/2021    Hyperlipidemia     Hypertension     Osteoarthritis     shots in knees by ortho       SURGICAL HISTORY    Past Surgical History:   Procedure Laterality Date    APPENDECTOMY  1959    BREAST BIOPSY      BREAST SURGERY      HYSTERECTOMY (CERVIX STATUS UNKNOWN)  1992    OVARY REMOVAL      [place] COLON CA SCRN NOT  [address] [facility] N/A 5/9/2018    COLONOSCOPY performed by Chacha Vazquez MD at [address]    Current Outpatient Rx   Medication Sig Dispense Refill    ketoconazole (NIZORAL) 2 % shampoo Use 3 times weekly for 2 weeks. 1 each 5    pantoprazole (PROTONIX) 40 MG tablet Take 1 tablet by mouth daily 30 tablet 5    ketoconazole (NIZORAL) 2 % cream Apply topically twice daily for at least 4 weeks (or for 1 week after lesions have healed). 1 each 5    clobetasol (TEMOVATE) 0.05 % cream Apply topically 2 times daily.  60 g 5    simvastatin (ZOCOR) 10 MG tablet TAKE ONE TABLET BY MOUTH ONCE DAILY IN THE EVENING 90 tablet 3    Ascorbic Acid (VITAMIN C GUMMIE) 120 MG CHEW Take by mouth      ketorolac (ACULAR) 0.5 % ophthalmic solution USE 1 DROP IN THE OPERATIVE EYE TWICE DAILY (Patient not taking: Reported on 2/10/2022)      neomycin-polymyxin-dexameth (MAXITROL) 3.5-12254-7.1 ophthalmic suspension INSTILL 1 DROP IN THE OPERATIVE EYE EVERY DAY (Patient not taking: Reported on 2/10/2022)      traZODone (DESYREL) 50 MG tablet Take half tablet every night as needed. If no improvement after 1 week, increase to 1 whole tablet (Patient not taking: Reported on 2/10/2022) 30 tablet 1    ibuprofen (ADVIL;MOTRIN) 800 MG tablet Take 1 tablet by mouth 2 times daily as needed for Pain 60 tablet 5    amLODIPine (NORVASC) 2.5 MG tablet Take 1 tablet by mouth daily 90 tablet 3    sodium chloride (OCEAN, BABY AYR) 0.65 % nasal spray 2 sprays by Nasal route 3 times daily (Patient not taking: Reported on 2/10/2022) 1 Bottle 0    Vitamin D (CHOLECALCIFEROL) 50 MCG (2000 UT) TABS tablet Take 1 tablet by mouth daily 60 tablet 0    mometasone (ELOCON) 0.1 % cream Apply topically daily. 50 g 11       ALLERGIES    Allergies   Allergen Reactions    Novocain [Procaine]     Shrimp (Diagnostic)      swelling       FAMILY HISTORY    History reviewed. No pertinent family history.     SOCIAL HISTORY    Social History     Socioeconomic History    Marital status:      Spouse name: None    Number of children: None    Years of education: None    Highest education level: None   Occupational History    None   Tobacco Use    Smoking status: Never Smoker    Smokeless tobacco: Never Used   Substance and Sexual Activity    Alcohol use: Yes     Comment: rarely    Drug use: No    Sexual activity: None   Other Topics Concern    None   Social History Narrative    None     Social Determinants of Health     Financial Resource Strain: Low Risk     Difficulty of Paying Living Expenses: Not hard at all   Food Insecurity: No Food Insecurity    Worried About Running Out of Food in the Last Year: Never true    [address] in the Last Year: Never true   Transportation Needs: No Transportation Needs    Lack of Transportation (Medical): No    Lack of Transportation (Non-Medical): No   Physical Activity:     Days of Exercise per Week: Not on file    Minutes of Exercise per Session: Not on file   Stress:     Feeling of Stress : Not on file   Social Connections:     Frequency of Communication with Friends and Family: Not on file    Frequency of Social Gatherings with Friends and Family: Not on file    Attends Taoism Services: Not on file    Active Member of Clubs or Organizations: Not on file    Attends Club or Organization Meetings: Not on file    Marital Status: Not on file   Intimate Partner Violence:     Fear of Current or Ex-Partner: Not on file    Emotionally Abused: Not on file    Physically Abused: Not on file    Sexually Abused: Not on file   Housing Stability:     Unable to Pay for Housing in the Last Year: Not on file    Number of [place] in the Last Year: Not on file    Unstable Housing in the Last Year: Not on file       REVIEW OF SYSTEMS    Constitutional:  Denies fever, chills, weight loss or weakness   Eyes:  Denies photophobia or discharge   HENT:  Denies sore throat or ear pain   Respiratory:  Denies cough or shortness of breath   Cardiovascular:  Denies chest pain, palpitations or swelling   GI:  Denies abdominal pain, nausea, vomiting, or diarrhea   Musculoskeletal:  Denies back pain   Skin: R eyebrow laceration ,  Denies rash   Neurologic:  Denies headache, focal weakness or sensory changes   Endocrine:  Denies polyuria or polydypsia   Lymphatic:  Denies swollen glands   Psychiatric:  Denies depression, suicidal ideation or homicidal ideation   All systems negative except as marked.      PHYSICAL EXAM    VITAL SIGNS: BP (!) 129/57   Pulse 76   Temp 98 °F (36.7 °C)   Resp 18   Ht 5' 2\" (1.575 m)   Wt 180 lb (81.6 kg)   SpO2 94%   BMI 32.92 kg/m²    Constitutional:  Well developed, Well nourished, No acute distress, Non-toxic appearance. HENT:  Normocephalic, Atraumatic, Bilateral external ears normal,R eyebrow laceration 3 cm, Bleeding not controlled ,no depressed skull fracture,  Oropharynx moist, No oral exudates, Nose normal. Neck- Normal range of motion, No tenderness, Supple, No stridor. Eyes:  PERRL, EOMI, Conjunctiva normal, No discharge. Respiratory:  Normal breath sounds, No respiratory distress, No wheezing, No chest tenderness. Cardiovascular:  Normal heart rate, Normal rhythm, No murmurs, No rubs, No gallops. GI:  Bowel sounds normal, Soft, No tenderness, No masses, No pulsatile masses. :No CVA tenderness. Musculoskeletal:  Intact distal pulses, No edema, No tenderness, No cyanosis, No clubbing. Good range of motion in all major joints. No tenderness to palpation or major deformities noted. Back- No tenderness. Integument:  Warm, Dry, No erythema, No rash. Lymphatic:  No lymphadenopathy noted. Neurologic:  Alert & oriented x 3, Normal motor function, Normal sensory function, No focal deficits noted. Psychiatric:  Affect normal, Judgment normal, Mood normal.       RADIOLOGY    CT HEAD WO CONTRAST   Final Result   Addendum 2 of 2   CR   Patient: Beny Harmon  Time Out: 23:53   Exam(s): CT HEAD Without Contrast        ADDENDUM - Added by Lady Winters MD on 6/25/2022 11:53 PM (-07:00)   Note to be made that comparison was made to a previous CT examination    dated 6/29/2021.   ----------------------------------------------------------------------       EXAM:     CT Head Without Intravenous Contrast       CLINICAL HISTORY:      Reason for exam: fall.  Chief complaint s/p fall today hit rt frontal    area  laceration above eye       TECHNIQUE:     Axial computed tomography images of the head/brain without intravenous    contrast.  All CT scan at this facility use dose modulation, iterative    reconstruction, and/or weight based dosing when appropriate to reduce    radiation dose to as low as reasonably achievable. COMPARISON:     None. FINDINGS:     Brain:  Mild generalized brain atrophy. Slight decreased attenuation    within the deep periventricular white matter most compatible with mild    microangiopathic white matter disease. No hemorrhage. Ventricles:  Unremarkable. No ventriculomegaly. Bones/joints:  Unremarkable. No acute fracture. Soft tissues:  Unremarkable. Sinuses:  Unremarkable as visualized. No acute sinusitis. Mastoid air cells:  Unremarkable as visualized. No mastoid effusion. IMPRESSION:        1. Chronic changes as described. 2.  No acute intracranial hemorrhage or space-occupying lesion. Communications:       06/26/22 00:03 Verify Receipt Verified receipt with BETY Gutierrez,    given to Dr. Luann Menjivar on 06/26 00:02 (-04:00)       Electronically signed by Manda Walker MD on 06-25-22 at [phone]      Final   1. Chronic changes as described. 2.  No acute intracranial hemorrhage or space-occupying lesion. CT CERVICAL SPINE WO CONTRAST   Final Result   1. Severe degenerative disease as described. 2.  No acute fracture or subluxation. CT FACIAL BONES WO CONTRAST   Final Result     Mild right-sided facial soft tissue swelling. No distinct facial bone    fracture seen. REEVALUATION   Patient was updated the results of  Radiology. Tolerating PO      PROCEDURES  Procedure Name: Laceration Repair  Location: R eyebrow 3 cm   Pre-Procedure Diagnosis: Laceration  Post-Procedure Diagnosis: Repaired Laceration  Informed consent was obtained before procedure started. PROCEDURE:  The appropriate timeout was taken. The area was prepped and draped in the usual sterile fashion. Local anesthesia was achieved using 3 cc of Lidocaine 1% with  epinephrine. The wound was copiously irrigated. 6, 6-0 Nylon interrupted sutures were placed. Estimated blood loss was less than 1 mL. A dressing was applied to the area and anticipatory guidance, as well as standard post-procedure care, was explained. Return precautions are given. The patient tolerated the procedure well without complications. Summation      Patient Course:     ED Medications administered this visit:    Medications   Tetanus-Diphth-Acell Pertussis (BOOSTRIX) injection 0.5 mL (0.5 mLs IntraMUSCular Given 6/25/22 4401)   lidocaine-EPINEPHrine-tetracaine (LET) topical solution 3 mL syringe (3 mLs Topical Given 6/25/22 4190)       New Prescriptions from this visit:    New Prescriptions    No medications on file       Follow-up:  Marilee Reno MD  [address] [place] [address]  [phone]    Call in 1 day          Final Impression:   1. Eyebrow laceration, right, sequela    2.  Injury of head, initial encounter               (Please note that portions of this note were completed with a voice recognition program.  Efforts were made to edit the dictations but occasionally words are mis-transcribed.)          Leticia Welch MD  06/26/22 0025

## 2022-06-28 ENCOUNTER — TELEPHONE (OUTPATIENT)
Dept: PRIMARY CARE CLINIC | Age: 72
End: 2022-06-28

## 2022-06-28 DIAGNOSIS — L21.9 SEBORRHEIC DERMATITIS: ICD-10-CM

## 2022-06-28 RX ORDER — IBUPROFEN 800 MG/1
800 TABLET ORAL 2 TIMES DAILY PRN
Qty: 60 TABLET | Refills: 5 | Status: SHIPPED | OUTPATIENT
Start: 2022-06-28

## 2022-06-28 RX ORDER — KETOCONAZOLE 20 MG/ML
SHAMPOO TOPICAL
Qty: 1 EACH | Refills: 5 | Status: SHIPPED | OUTPATIENT
Start: 2022-06-28

## 2022-06-28 NOTE — TELEPHONE ENCOUNTER
She went to the [facility] ER on Saturday and the ER doctor wanted her to get ELTA MD Sunscreen. She cant find this anywhere and is asking if you know what she could use as an alternative? She was supposed to use this to protect around her right eye where she had fallen and hurt herself. Her # is [phone].

## 2022-07-22 ENCOUNTER — TELEPHONE (OUTPATIENT)
Dept: PRIMARY CARE CLINIC | Age: 72
End: 2022-07-22

## 2022-07-22 DIAGNOSIS — H66.90 ACUTE OTITIS MEDIA, UNSPECIFIED OTITIS MEDIA TYPE: ICD-10-CM

## 2022-07-22 DIAGNOSIS — J00 ACUTE NASOPHARYNGITIS: Primary | ICD-10-CM

## 2022-07-22 RX ORDER — CEFUROXIME AXETIL 500 MG/1
500 TABLET ORAL 2 TIMES DAILY
Qty: 20 TABLET | Refills: 0 | Status: SHIPPED | OUTPATIENT
Start: 2022-07-22 | End: 2022-08-01

## 2022-07-22 NOTE — TELEPHONE ENCOUNTER
Pt called in to say they have a sinus infection from their daughter and would like something called in.   Please advise

## 2022-07-28 ENCOUNTER — TELEPHONE (OUTPATIENT)
Dept: PRIMARY CARE CLINIC | Age: 72
End: 2022-07-28

## 2022-07-28 DIAGNOSIS — R22.0 SCALP LUMP: Primary | ICD-10-CM

## 2022-07-28 DIAGNOSIS — T14.90XA CLOSED WOUND: ICD-10-CM

## 2022-07-28 RX ORDER — TALC/ZINC OXIDE 81-15%
1 POWDER (GRAM) TOPICAL 2 TIMES DAILY
Qty: 113 G | Refills: 5 | Status: SHIPPED | OUTPATIENT
Start: 2022-07-28

## 2022-08-24 DIAGNOSIS — I10 ESSENTIAL HYPERTENSION: ICD-10-CM

## 2022-08-24 RX ORDER — AMLODIPINE BESYLATE 2.5 MG/1
2.5 TABLET ORAL DAILY
Qty: 90 TABLET | Refills: 3 | Status: SHIPPED | OUTPATIENT
Start: 2022-08-24

## 2022-08-24 NOTE — TELEPHONE ENCOUNTER
Requested Prescriptions     Pending Prescriptions Disp Refills    amLODIPine (NORVASC) 2.5 MG tablet 90 tablet 3     Sig: Take 1 tablet by mouth daily

## 2022-09-21 ENCOUNTER — OFFICE VISIT (OUTPATIENT)
Dept: PRIMARY CARE CLINIC | Age: 72
End: 2022-09-21
Payer: MEDICARE

## 2022-09-21 VITALS
HEART RATE: 66 BPM | TEMPERATURE: 97.3 F | WEIGHT: 198 LBS | SYSTOLIC BLOOD PRESSURE: 124 MMHG | DIASTOLIC BLOOD PRESSURE: 60 MMHG | OXYGEN SATURATION: 97 % | BODY MASS INDEX: 36.44 KG/M2 | HEIGHT: 62 IN

## 2022-09-21 VITALS — HEIGHT: 62 IN | WEIGHT: 198 LBS | BODY MASS INDEX: 36.44 KG/M2

## 2022-09-21 DIAGNOSIS — E78.49 OTHER HYPERLIPIDEMIA: ICD-10-CM

## 2022-09-21 DIAGNOSIS — E66.01 SEVERE OBESITY (BMI 35.0-39.9) WITH COMORBIDITY (HCC): ICD-10-CM

## 2022-09-21 DIAGNOSIS — R73.9 HYPERGLYCEMIA: ICD-10-CM

## 2022-09-21 DIAGNOSIS — E53.8 VITAMIN B 12 DEFICIENCY: ICD-10-CM

## 2022-09-21 DIAGNOSIS — I10 ESSENTIAL HYPERTENSION: ICD-10-CM

## 2022-09-21 DIAGNOSIS — Z00.00 PREVENTATIVE HEALTH CARE: ICD-10-CM

## 2022-09-21 DIAGNOSIS — R30.0 DYSURIA: Primary | ICD-10-CM

## 2022-09-21 DIAGNOSIS — E03.9 HYPOTHYROIDISM, UNSPECIFIED TYPE: ICD-10-CM

## 2022-09-21 DIAGNOSIS — G47.09 OTHER INSOMNIA: ICD-10-CM

## 2022-09-21 DIAGNOSIS — R30.0 DYSURIA: ICD-10-CM

## 2022-09-21 DIAGNOSIS — Z00.00 MEDICARE ANNUAL WELLNESS VISIT, SUBSEQUENT: ICD-10-CM

## 2022-09-21 LAB
ALBUMIN SERPL-MCNC: 4.7 G/DL (ref 3.5–4.6)
ALP BLD-CCNC: 93 U/L (ref 40–130)
ALT SERPL-CCNC: 17 U/L (ref 0–33)
ANION GAP SERPL CALCULATED.3IONS-SCNC: 10 MEQ/L (ref 9–15)
AST SERPL-CCNC: 18 U/L (ref 0–35)
BACTERIA: ABNORMAL /HPF
BASOPHILS ABSOLUTE: 0 K/UL (ref 0–0.2)
BASOPHILS RELATIVE PERCENT: 0.5 %
BILIRUB SERPL-MCNC: 0.3 MG/DL (ref 0.2–0.7)
BILIRUBIN URINE: NEGATIVE
BLOOD, URINE: NEGATIVE
BUN BLDV-MCNC: 20 MG/DL (ref 8–23)
CALCIUM SERPL-MCNC: 9.4 MG/DL (ref 8.5–9.9)
CHLORIDE BLD-SCNC: 100 MEQ/L (ref 95–107)
CHOLESTEROL, FASTING: 189 MG/DL (ref 0–199)
CLARITY: ABNORMAL
CO2: 28 MEQ/L (ref 20–31)
COLOR: YELLOW
CREAT SERPL-MCNC: 0.55 MG/DL (ref 0.5–0.9)
EOSINOPHILS ABSOLUTE: 0.1 K/UL (ref 0–0.7)
EOSINOPHILS RELATIVE PERCENT: 2.2 %
EPITHELIAL CELLS, UA: ABNORMAL /HPF (ref 0–5)
GFR AFRICAN AMERICAN: >60
GFR NON-AFRICAN AMERICAN: >60
GLOBULIN: 2.6 G/DL (ref 2.3–3.5)
GLUCOSE BLD-MCNC: 108 MG/DL (ref 70–99)
GLUCOSE URINE: NEGATIVE MG/DL
HBA1C MFR BLD: 6.1 % (ref 4.8–5.9)
HCT VFR BLD CALC: 43.9 % (ref 37–47)
HDLC SERPL-MCNC: 42 MG/DL (ref 40–59)
HEMOGLOBIN: 13.9 G/DL (ref 12–16)
HYALINE CASTS: ABNORMAL /HPF (ref 0–5)
KETONES, URINE: ABNORMAL MG/DL
LDL CHOLESTEROL CALCULATED: 119 MG/DL (ref 0–129)
LEUKOCYTE ESTERASE, URINE: ABNORMAL
LYMPHOCYTES ABSOLUTE: 1.9 K/UL (ref 1–4.8)
LYMPHOCYTES RELATIVE PERCENT: 29.1 %
MCH RBC QN AUTO: 27.2 PG (ref 27–31.3)
MCHC RBC AUTO-ENTMCNC: 31.7 % (ref 33–37)
MCV RBC AUTO: 85.7 FL (ref 82–100)
MONOCYTES ABSOLUTE: 0.5 K/UL (ref 0.2–0.8)
MONOCYTES RELATIVE PERCENT: 8.2 %
NEUTROPHILS ABSOLUTE: 3.9 K/UL (ref 1.4–6.5)
NEUTROPHILS RELATIVE PERCENT: 60 %
NITRITE, URINE: NEGATIVE
PDW BLD-RTO: 13.9 % (ref 11.5–14.5)
PH UA: 5 (ref 5–9)
PLATELET # BLD: 324 K/UL (ref 130–400)
POTASSIUM SERPL-SCNC: 4.4 MEQ/L (ref 3.4–4.9)
PROTEIN UA: NEGATIVE MG/DL
RBC # BLD: 5.12 M/UL (ref 4.2–5.4)
RBC UA: ABNORMAL /HPF (ref 0–5)
SODIUM BLD-SCNC: 138 MEQ/L (ref 135–144)
SPECIFIC GRAVITY UA: 1.02 (ref 1–1.03)
TOTAL PROTEIN: 7.3 G/DL (ref 6.3–8)
TRIGLYCERIDE, FASTING: 139 MG/DL (ref 0–150)
TSH REFLEX: 1.85 UIU/ML (ref 0.44–3.86)
UROBILINOGEN, URINE: 0.2 E.U./DL
WBC # BLD: 6.5 K/UL (ref 4.8–10.8)
WBC UA: ABNORMAL /HPF (ref 0–5)

## 2022-09-21 PROCEDURE — 1123F ACP DISCUSS/DSCN MKR DOCD: CPT | Performed by: INTERNAL MEDICINE

## 2022-09-21 PROCEDURE — 99213 OFFICE O/P EST LOW 20 MIN: CPT | Performed by: INTERNAL MEDICINE

## 2022-09-21 PROCEDURE — G8399 PT W/DXA RESULTS DOCUMENT: HCPCS | Performed by: INTERNAL MEDICINE

## 2022-09-21 PROCEDURE — G0439 PPPS, SUBSEQ VISIT: HCPCS | Performed by: INTERNAL MEDICINE

## 2022-09-21 PROCEDURE — G8417 CALC BMI ABV UP PARAM F/U: HCPCS | Performed by: INTERNAL MEDICINE

## 2022-09-21 PROCEDURE — 1036F TOBACCO NON-USER: CPT | Performed by: INTERNAL MEDICINE

## 2022-09-21 PROCEDURE — 3017F COLORECTAL CA SCREEN DOC REV: CPT | Performed by: INTERNAL MEDICINE

## 2022-09-21 PROCEDURE — G8427 DOCREV CUR MEDS BY ELIG CLIN: HCPCS | Performed by: INTERNAL MEDICINE

## 2022-09-21 PROCEDURE — 1090F PRES/ABSN URINE INCON ASSESS: CPT | Performed by: INTERNAL MEDICINE

## 2022-09-21 RX ORDER — NITROFURANTOIN 25; 75 MG/1; MG/1
100 CAPSULE ORAL 2 TIMES DAILY
Qty: 20 CAPSULE | Refills: 0 | Status: SHIPPED | OUTPATIENT
Start: 2022-09-21 | End: 2022-10-01

## 2022-09-21 RX ORDER — DIAPER,BRIEF,INFANT-TODD,DISP
EACH MISCELLANEOUS
COMMUNITY
Start: 2022-07-28

## 2022-09-21 RX ORDER — TRAZODONE HYDROCHLORIDE 150 MG/1
150 TABLET ORAL NIGHTLY PRN
Qty: 30 TABLET | Refills: 5 | Status: SHIPPED | OUTPATIENT
Start: 2022-09-21 | End: 2022-10-13

## 2022-09-21 SDOH — ECONOMIC STABILITY: FOOD INSECURITY: WITHIN THE PAST 12 MONTHS, THE FOOD YOU BOUGHT JUST DIDN'T LAST AND YOU DIDN'T HAVE MONEY TO GET MORE.: NEVER TRUE

## 2022-09-21 SDOH — ECONOMIC STABILITY: FOOD INSECURITY: WITHIN THE PAST 12 MONTHS, YOU WORRIED THAT YOUR FOOD WOULD RUN OUT BEFORE YOU GOT MONEY TO BUY MORE.: NEVER TRUE

## 2022-09-21 ASSESSMENT — ENCOUNTER SYMPTOMS
ABDOMINAL DISTENTION: 0
FACIAL SWELLING: 0
ABDOMINAL DISTENTION: 0
PHOTOPHOBIA: 0
BLOOD IN STOOL: 0
ABDOMINAL PAIN: 0
APNEA: 0
COUGH: 0
BLOOD IN STOOL: 0
PHOTOPHOBIA: 0
APNEA: 0
CHOKING: 0

## 2022-09-21 ASSESSMENT — PATIENT HEALTH QUESTIONNAIRE - PHQ9
SUM OF ALL RESPONSES TO PHQ QUESTIONS 1-9: 0
SUM OF ALL RESPONSES TO PHQ9 QUESTIONS 1 & 2: 0
SUM OF ALL RESPONSES TO PHQ QUESTIONS 1-9: 0
1. LITTLE INTEREST OR PLEASURE IN DOING THINGS: 0
SUM OF ALL RESPONSES TO PHQ QUESTIONS 1-9: 0
2. FEELING DOWN, DEPRESSED OR HOPELESS: 0
SUM OF ALL RESPONSES TO PHQ QUESTIONS 1-9: 0

## 2022-09-21 ASSESSMENT — SOCIAL DETERMINANTS OF HEALTH (SDOH): HOW HARD IS IT FOR YOU TO PAY FOR THE VERY BASICS LIKE FOOD, HOUSING, MEDICAL CARE, AND HEATING?: NOT HARD AT ALL

## 2022-09-21 ASSESSMENT — LIFESTYLE VARIABLES
HOW OFTEN DO YOU HAVE A DRINK CONTAINING ALCOHOL: NEVER
HOW MANY STANDARD DRINKS CONTAINING ALCOHOL DO YOU HAVE ON A TYPICAL DAY: PATIENT DOES NOT DRINK

## 2022-09-21 NOTE — PROGRESS NOTES
Carol Ann Caballero 70 y.o. female presents today with   Chief Complaint   Patient presents with    Urinary Tract Infection     Onset x 3 weeks ago, burning sensation. Frequently going with small amounts of urine. Has the erg to go but can't. Insomnia     Hard for her to stay asleep and feeling very fatigue. Having no energy. Gets about 2-3 of solid sleep. Other     B-12 shot        Insomnia  This is a recurrent problem. The current episode started more than 1 year ago. The problem occurs daily (she sleeps better with her , he work in [place], she does visit him). The problem has been waxing and waning. Associated symptoms include fatigue (slightly sometimes, she wants a vit b 12 shot). Pertinent negatives include no abdominal pain, chest pain, chills, fever, joint swelling or rash. Dysuria   This is a new problem. The current episode started in the past 7 days. The problem occurs every urination. The problem has been waxing and waning. The quality of the pain is described as aching. The pain is at a severity of 1/10. Pertinent negatives include no chills or hematuria. Past Medical History:   Diagnosis Date    Acid reflux     Chronic back pain     COVID-19 06/2021    Hyperlipidemia     Hypertension     Osteoarthritis     shots in knees by ortho     Patient Active Problem List    Diagnosis Date Noted    Pneumonia due to COVID-19 virus 06/29/2021     Past Surgical History:   Procedure Laterality Date    APPENDECTOMY  1959    BREAST BIOPSY      BREAST SURGERY      HYSTERECTOMY (CERVIX STATUS UNKNOWN)  1992    OVARY REMOVAL      [place] COLON CA SCRN NOT  [address] [facility] N/A 5/9/2018    COLONOSCOPY performed by Alessandro Buckley MD at [address] [facility]     No family history on file.   Social History     Socioeconomic History    Marital status:      Spouse name: None    Number of children: None    Years of education: None    Highest education level: None   Tobacco Use    Smoking status: Never Smokeless tobacco: Never   Substance and Sexual Activity    Alcohol use: Yes     Comment: rarely    Drug use: No     Social Determinants of Health     Financial Resource Strain: Low Risk     Difficulty of Paying Living Expenses: Not hard at all   Food Insecurity: No Food Insecurity    Worried About Running Out of Food in the Last Year: Never true    Ran Out of Food in the Last Year: Never true   Physical Activity: Sufficiently Active    Days of Exercise per Week: 6 days    Minutes of Exercise per Session: 30 min     Allergies   Allergen Reactions    Lidocaine Other (See Comments)    Novocain [Procaine]     Shrimp (Diagnostic)      swelling       Review of Systems   Constitutional:  Positive for fatigue (slightly sometimes, she wants a vit b 12 shot). Negative for chills and fever. HENT:  Negative for facial swelling and nosebleeds. Eyes:  Negative for photophobia and visual disturbance. Respiratory:  Negative for apnea and choking. Cardiovascular:  Negative for chest pain and palpitations. Gastrointestinal:  Negative for abdominal distention, abdominal pain and blood in stool. Genitourinary:  Positive for dysuria. Negative for enuresis, hematuria and vaginal bleeding. Musculoskeletal:  Negative for gait problem and joint swelling. Skin:  Negative for rash. Neurological:  Negative for syncope and speech difficulty. Hematological:  Does not bruise/bleed easily. Psychiatric/Behavioral:  Positive for sleep disturbance. Negative for hallucinations and suicidal ideas. The patient has insomnia. Vitals:    09/21/22 0957   BP: 124/60   Site: Left Upper Arm   Position: Sitting   Cuff Size: Large Adult   Pulse: 66   Temp: 97.3 °F (36.3 °C)   SpO2: 97%   Weight: 198 lb (89.8 kg)   Height: 5' 2\" (1.575 m)       Physical Exam  Constitutional:       Appearance: She is well-developed. HENT:      Head: Normocephalic.    Eyes:      Conjunctiva/sclera: Conjunctivae normal.   Cardiovascular:      Rate and Rhythm: Normal rate and regular rhythm. Heart sounds: Normal heart sounds. Pulmonary:      Effort: No respiratory distress. Breath sounds: Normal breath sounds. No wheezing. Abdominal:      General: There is no distension. Musculoskeletal:         General: Normal range of motion. Cervical back: Normal range of motion. Neurological:      Mental Status: She is alert and oriented to person, place, and time. Cranial Nerves: No cranial nerve deficit. Psychiatric:         Mood and Affect: Mood normal.      Assessment/Plan  Lenora was seen today for urinary tract infection, insomnia and other. Diagnoses and all orders for this visit:    Dysuria  -     Urinalysis; Future  -     Culture, Urine; Future  -     nitrofurantoin, macrocrystal-monohydrate, (MACROBID) 100 MG capsule; Take 1 capsule by mouth 2 times daily for 10 days    Essential hypertension    Preventative health care  -     Hemoglobin A1C; Future  -     TSH with Reflex; Future  -     CBC with Auto Differential; Future  -     Comprehensive Metabolic Panel; Future  -     Lipid, Fasting; Future    Hypothyroidism, unspecified type  -     TSH with Reflex; Future    Hyperglycemia  -     Hemoglobin A1C; Future    Other hyperlipidemia  -     Comprehensive Metabolic Panel; Future  -     Lipid, Fasting; Future    Other insomnia  -     traZODone (DESYREL) 150 MG tablet; Take 1 tablet by mouth nightly as needed for Sleep (prn)    Severe obesity (BMI 35.0-39. 9) with comorbidity ([address]    Vitamin B 12 deficiency      No follow-ups on file.     Joi Andersen MD

## 2022-09-21 NOTE — PATIENT INSTRUCTIONS
Personalized Preventive Plan for Dann Rodriguez - 9/21/2022  Medicare offers a range of preventive health benefits. Some of the tests and screenings are paid in full while other may be subject to a deductible, co-insurance, and/or copay. Some of these benefits include a comprehensive review of your medical history including lifestyle, illnesses that may run in your family, and various assessments and screenings as appropriate. After reviewing your medical record and screening and assessments performed today your provider may have ordered immunizations, labs, imaging, and/or referrals for you. A list of these orders (if applicable) as well as your Preventive Care list are included within your After Visit Summary for your review. Other Preventive Recommendations:    A preventive eye exam performed by an eye specialist is recommended every 1-2 years to screen for glaucoma; cataracts, macular degeneration, and other eye disorders. A preventive dental visit is recommended every 6 months. Try to get at least 150 minutes of exercise per week or 10,000 steps per day on a pedometer . Order or download the FREE \"Exercise & Physical Activity: Your Everyday Guide\" from [employer]. Call 7-552.312.9311 or search [employer] online. You need 5710-1189 mg of calcium and 3147-6838 IU of vitamin D per day. It is possible to meet your calcium requirement with diet alone, but a vitamin D supplement is usually necessary to meet this goal.  When exposed to the sun, use a sunscreen that protects against both UVA and UVB radiation with an SPF of 30 or greater. Reapply every 2 to 3 hours or after sweating, drying off with a towel, or swimming. Always wear a seat belt when traveling in a car. Always wear a helmet when riding a bicycle or motorcycle.

## 2022-09-21 NOTE — PROGRESS NOTES
Positive for arthralgias. Negative for gait problem and joint swelling. Skin:  Negative for rash. Neurological:  Negative for syncope and speech difficulty. Hematological:  Does not bruise/bleed easily. Psychiatric/Behavioral:  Negative for hallucinations and suicidal ideas. Vitals:    09/21/22 0945   Weight: 198 lb (89.8 kg)   Height: 5' 2\" (1.575 m)       Physical Exam  Constitutional:       Appearance: She is well-developed. HENT:      Head: Normocephalic. Eyes:      Conjunctiva/sclera: Conjunctivae normal.   Cardiovascular:      Rate and Rhythm: Normal rate and regular rhythm. Heart sounds: Normal heart sounds. Pulmonary:      Breath sounds: Normal breath sounds. Abdominal:      General: There is no distension. Musculoskeletal:         General: Normal range of motion. Cervical back: Normal range of motion. Neurological:      Mental Status: She is oriented to person, place, and time. Psychiatric:         Mood and Affect: Mood normal.    Medicare Annual Wellness Visit    Lisette Mckenzie is here for No chief complaint on file. Assessment & Plan   Dysuria  -     Urinalysis; Future  -     Culture, Urine; Future  Medicare annual wellness visit, subsequent    Recommendations for Preventive Services Due: see orders and patient instructions/AVS.  Recommended screening schedule for the next 5-10 years is provided to the patient in written form: see Patient Instructions/AVS.     Return in 1 year (on 9/21/2023) for Medicare Annual Wellness Visit in 1 year. Subjective       Patient's complete Health Risk Assessment and screening values have been reviewed and are found in Flowsheets. The following problems were reviewed today and where indicated follow up appointments were made and/or referrals ordered.     Positive Risk Factor Screenings with Interventions:    Fall Risk:  Do you feel unsteady or are you worried about falling? : no  2 or more falls in past year?: (!) yes  Fall with injury in past year?: (!) yes   Fall Risk Interventions:    discussed            General Health and ACP:  General  In general, how would you say your health is?: Good  In the past 7 days, have you experienced any of the following: New or Increased Pain, New or Increased Fatigue, Loneliness, Social Isolation, Stress or Anger?: (!) Yes  Select all that apply: (!) New or Increased Pain  Do you get the social and emotional support that you need?: Yes  Do you have a Living Will?: Yes    Advance Directives       Power of  Living Will ACP-Advance Directive ACP-Power of     Not on File Not on File Not on File Not on File        General Health Risk Interventions:  discussed    Health Habits/Nutrition:  Physical Activity: Sufficiently Active    Days of Exercise per Week: 6 days    Minutes of Exercise per Session: 30 min     Have you lost any weight without trying in the past 3 months?: No  Body mass index: (!) 36.21  Have you seen the dentist within the past year?: (!) No  Health Habits/Nutrition Interventions:  discussed             Objective   Vitals:    09/21/22 0945   Weight: 198 lb (89.8 kg)   Height: 5' 2\" (1.575 m)      Body mass index is 36.21 kg/m². Allergies   Allergen Reactions    Lidocaine Other (See Comments)    Novocain [Procaine]     Shrimp (Diagnostic)      swelling     Prior to Visit Medications    Medication Sig Taking? Authorizing Provider   Diaper Rash Products ([employer] DIAPER) 1-10 % CREA APPLY TO AFFECTED AREA TWICE A DAY Yes Historical Provider, MD   amLODIPine (NORVASC) 2.5 MG tablet Take 1 tablet by mouth daily Yes Bharat Guo MD   Dimethicone-Zinc Oxide-Vit A-D ([employer] ZINC OXIDE DIAPER) 1-10 % CREA Apply 1 applicator topically 2 times daily Yes Bharat Guo MD   ibuprofen (ADVIL;MOTRIN) 800 MG tablet Take 1 tablet by mouth 2 times daily as needed for Pain Yes Bharat Guo MD   ketoconazole (NIZORAL) 2 % shampoo Use 3 times weekly for 2 weeks.  Yes Bharat Guo MD   Zinc Oxide (ELTA SEAL MOISTURE BARRIER) 6 % CREA Apply 1 applicator topically in the morning and at bedtime Yes Frances Garcia MD   pantoprazole (PROTONIX) 40 MG tablet Take 1 tablet by mouth daily Yes Frances Garcia MD   ketoconazole (NIZORAL) 2 % cream Apply topically twice daily for at least 4 weeks (or for 1 week after lesions have healed). Yes Frances Garcia MD   clobetasol (TEMOVATE) 0.05 % cream Apply topically 2 times daily. Yes Frances Garcia MD   simvastatin (ZOCOR) 10 MG tablet TAKE ONE TABLET BY MOUTH ONCE DAILY IN THE EVENING Yes Frances Garcia MD   Ascorbic Acid (VITAMIN C GUMMIE) 120 MG CHEW Take by mouth Yes Historical Provider, MD   Vitamin D (CHOLECALCIFEROL) 50 MCG (2000 UT) TABS tablet Take 1 tablet by mouth daily Yes Fátima Howell MD   mometasone (ELOCON) 0.1 % cream Apply topically daily. Yes Frances Garcia MD   traZODone (DESYREL) 150 MG tablet Take 1 tablet by mouth nightly as needed for Sleep (prn)  Frances Garcia MD   nitrofurantoin, macrocrystal-monohydrate, (MACROBID) 100 MG capsule Take 1 capsule by mouth 2 times daily for 10 days  Frances Garcia MD   ketorolac (ACULAR) 0.5 % ophthalmic solution USE 1 DROP IN THE OPERATIVE EYE TWICE DAILY  Patient not taking: No sig reported  Historical Provider, MD   neomycin-polymyxin-dexameth (MAXITROL) 3.5-43056-5.1 ophthalmic suspension INSTILL 1 DROP IN THE OPERATIVE EYE EVERY DAY  Patient not taking: No sig reported  Historical Provider, MD   traZODone (DESYREL) 50 MG tablet Take half tablet every night as needed.  If no improvement after 1 week, increase to 1 whole tablet  Patient not taking: No sig reported  Mushtaq Gonzalez MD   sodium chloride (OCEAN, BABY AYR) 0.65 % nasal spray 2 sprays by Nasal route 3 times daily  Patient not taking: No sig reported  Fátima Howell MD       [facility] (Including outside providers/suppliers regularly involved in providing care):   Patient Care Team:  Frances Garcia MD as PCP - General (Internal Medicine)  Frances Garcia MD as PCP - [facility] Empaneled Provider     Reviewed and updated this visit:  Allergies  Meds

## 2022-09-22 LAB
ORGANISM: ABNORMAL
URINE CULTURE, ROUTINE: ABNORMAL
URINE CULTURE, ROUTINE: ABNORMAL

## 2022-10-03 ENCOUNTER — TELEPHONE (OUTPATIENT)
Dept: PRIMARY CARE CLINIC | Age: 72
End: 2022-10-03

## 2022-10-03 NOTE — TELEPHONE ENCOUNTER
Received call from the call center([facility]) stating patient would like to schedule an appointment for B12 injection,if possible that staff may contact the patient for scheduling.

## 2022-10-13 DIAGNOSIS — G47.09 OTHER INSOMNIA: ICD-10-CM

## 2022-10-13 RX ORDER — TRAZODONE HYDROCHLORIDE 150 MG/1
TABLET ORAL
Qty: 30 TABLET | Refills: 5 | Status: SHIPPED | OUTPATIENT
Start: 2022-10-13

## 2022-12-06 ENCOUNTER — TELEPHONE (OUTPATIENT)
Dept: PRIMARY CARE CLINIC | Age: 72
End: 2022-12-06

## 2022-12-06 DIAGNOSIS — Z12.31 SCREENING MAMMOGRAM, ENCOUNTER FOR: Primary | ICD-10-CM

## 2022-12-06 NOTE — TELEPHONE ENCOUNTER
----- Message from Meredith Patrick sent at 12/6/2022  1:12 PM EST -----  Subject: Referral Request    Reason for referral request? routine mammogram  Provider patient wants to be referred to(if known):     Provider Phone Number(if known): Additional Information for Provider?  Pt would like to go to the location [address] [place] [address]  ---------------------------------------------------------------------------  --------------  [address] [facility]    [phone]; OK to leave message on voicemail  ---------------------------------------------------------------------------  --------------

## 2022-12-23 ENCOUNTER — HOSPITAL ENCOUNTER (OUTPATIENT)
Dept: WOMENS IMAGING | Age: 72
Discharge: HOME | End: 2022-12-23
Payer: MEDICARE

## 2022-12-23 VITALS — HEIGHT: 62 IN | BODY MASS INDEX: 36.21 KG/M2

## 2022-12-23 DIAGNOSIS — Z12.31 SCREENING MAMMOGRAM, ENCOUNTER FOR: ICD-10-CM

## 2022-12-23 PROCEDURE — 77067 SCR MAMMO BI INCL CAD: CPT

## 2022-12-26 NOTE — RESULT ENCOUNTER NOTE
ANSON results back- please advise no evidence of malignancy, would recommend screening ANSON in 1 year.

## 2023-01-18 DIAGNOSIS — L30.9 ECZEMA, UNSPECIFIED TYPE: ICD-10-CM

## 2023-01-18 DIAGNOSIS — L21.9 SEBORRHEIC DERMATITIS: ICD-10-CM

## 2023-01-18 RX ORDER — KETOCONAZOLE 20 MG/ML
SHAMPOO TOPICAL
Qty: 1 EACH | Refills: 5 | Status: SHIPPED | OUTPATIENT
Start: 2023-01-18

## 2023-01-18 RX ORDER — MOMETASONE FUROATE 1 MG/G
CREAM TOPICAL
Qty: 50 G | Refills: 11 | Status: SHIPPED | OUTPATIENT
Start: 2023-01-18

## 2023-01-18 RX ORDER — KETOCONAZOLE 20 MG/G
CREAM TOPICAL
Qty: 1 EACH | Refills: 5 | Status: SHIPPED | OUTPATIENT
Start: 2023-01-18

## 2023-01-25 ENCOUNTER — OFFICE VISIT (OUTPATIENT)
Dept: FAMILY MEDICINE CLINIC | Age: 73
End: 2023-01-25
Payer: MEDICARE

## 2023-01-25 VITALS
WEIGHT: 180 LBS | BODY MASS INDEX: 33.13 KG/M2 | HEART RATE: 78 BPM | HEIGHT: 62 IN | SYSTOLIC BLOOD PRESSURE: 128 MMHG | OXYGEN SATURATION: 98 % | DIASTOLIC BLOOD PRESSURE: 64 MMHG | TEMPERATURE: 97 F

## 2023-01-25 DIAGNOSIS — M79.651 RIGHT THIGH PAIN: Primary | ICD-10-CM

## 2023-01-25 PROCEDURE — G8417 CALC BMI ABV UP PARAM F/U: HCPCS | Performed by: NURSE PRACTITIONER

## 2023-01-25 PROCEDURE — 99213 OFFICE O/P EST LOW 20 MIN: CPT | Performed by: NURSE PRACTITIONER

## 2023-01-25 PROCEDURE — G8399 PT W/DXA RESULTS DOCUMENT: HCPCS | Performed by: NURSE PRACTITIONER

## 2023-01-25 PROCEDURE — 3017F COLORECTAL CA SCREEN DOC REV: CPT | Performed by: NURSE PRACTITIONER

## 2023-01-25 PROCEDURE — 1090F PRES/ABSN URINE INCON ASSESS: CPT | Performed by: NURSE PRACTITIONER

## 2023-01-25 PROCEDURE — G8484 FLU IMMUNIZE NO ADMIN: HCPCS | Performed by: NURSE PRACTITIONER

## 2023-01-25 PROCEDURE — 1123F ACP DISCUSS/DSCN MKR DOCD: CPT | Performed by: NURSE PRACTITIONER

## 2023-01-25 PROCEDURE — 1036F TOBACCO NON-USER: CPT | Performed by: NURSE PRACTITIONER

## 2023-01-25 PROCEDURE — G8427 DOCREV CUR MEDS BY ELIG CLIN: HCPCS | Performed by: NURSE PRACTITIONER

## 2023-01-25 ASSESSMENT — PATIENT HEALTH QUESTIONNAIRE - PHQ9
SUM OF ALL RESPONSES TO PHQ QUESTIONS 1-9: 0
SUM OF ALL RESPONSES TO PHQ QUESTIONS 1-9: 0
SUM OF ALL RESPONSES TO PHQ9 QUESTIONS 1 & 2: 0
SUM OF ALL RESPONSES TO PHQ QUESTIONS 1-9: 0
2. FEELING DOWN, DEPRESSED OR HOPELESS: 0
SUM OF ALL RESPONSES TO PHQ QUESTIONS 1-9: 0
1. LITTLE INTEREST OR PLEASURE IN DOING THINGS: 0

## 2023-01-25 NOTE — PROGRESS NOTES
Subjective:      Patient ID: Melany Edgar is a 67 y.o. female who presents today for:  Chief Complaint   Patient presents with    Fall     Pt states she tripped in the dark and fell, hurt her right hand and upper leg x2weeks, says going up steps is too difficult, walking is uncomfortable         HPI  Patient is here with c/o right hand pain for the last 2 weeks. Says she fell 2 week ago. Says she also has pain to the upper part of the leg. Say it is not painful to touch but is painful to lift her leg. Says she is not able to walk up stairs because of the pain. Says she has arthritis and assumed that is what was causing the pain and then remembered that she fell. Says she tripped in the dark and fell straight down onto her hands and knees. Denies any swelling or bruising. Says she does have Ibuprofen 800mg as needed.      Past Medical History:   Diagnosis Date    Acid reflux     Chronic back pain     COVID-19 06/2021    Hyperlipidemia     Hypertension     Osteoarthritis     shots in knees by ortho     Past Surgical History:   Procedure Laterality Date    APPENDECTOMY  1959    BREAST BIOPSY      BREAST SURGERY      HYSTERECTOMY (CERVIX STATUS UNKNOWN)  1992    OVARY REMOVAL      UT COLON CA SCRN NOT  [address] [place] [address] N/A 5/9/2018    COLONOSCOPY performed by Ana Lilia Hadley MD at [address] History     Socioeconomic History    Marital status:      Spouse name: Not on file    Number of children: Not on file    Years of education: Not on file    Highest education level: Not on file   Occupational History    Not on file   Tobacco Use    Smoking status: Never    Smokeless tobacco: Never   Substance and Sexual Activity    Alcohol use: Yes     Comment: rarely    Drug use: No    Sexual activity: Not on file   Other Topics Concern    Not on file   Social History Narrative    Not on file     Social Determinants of Health     Financial Resource Strain: Low Risk     Difficulty of Paying Living Expenses: Not hard at all   Food Insecurity: No Food Insecurity    Worried About Running Out of Food in the Last Year: Never true    Ran Out of Food in the Last Year: Never true   Transportation Needs: Not on file   Physical Activity: Sufficiently Active    Days of Exercise per Week: 6 days    Minutes of Exercise per Session: 30 min   Stress: Not on file   Social Connections: Not on file   Intimate Partner Violence: Not on file   Housing Stability: Not on file     No family history on file. Allergies   Allergen Reactions    Lidocaine Other (See Comments)    Novocain [Procaine]     Shrimp (Diagnostic)      swelling     Current Outpatient Medications   Medication Sig Dispense Refill    ketoconazole (NIZORAL) 2 % shampoo Use 3 times weekly for 2 weeks. 1 each 5    ketoconazole (NIZORAL) 2 % cream Apply topically twice daily for at least 4 weeks (or for 1 week after lesions have healed). 1 each 5    mometasone (ELOCON) 0.1 % cream Apply topically daily. 50 g 11    traZODone (DESYREL) 150 MG tablet TAKE 1 TABLET BY MOUTH NIGHTLY AS NEEDED FOR SLEEP 30 tablet 5    Diaper Rash Products ([employer]S DIAPER) 1-10 % CREA APPLY TO AFFECTED AREA TWICE A DAY      amLODIPine (NORVASC) 2.5 MG tablet Take 1 tablet by mouth daily 90 tablet 3    Dimethicone-Zinc Oxide-Vit A-D ([employer]S ZINC OXIDE DIAPER) 1-10 % CREA Apply 1 applicator topically 2 times daily 113 g 5    Zinc Oxide (ELTA SEAL MOISTURE BARRIER) 6 % CREA Apply 1 applicator topically in the morning and at bedtime 114 g 0    pantoprazole (PROTONIX) 40 MG tablet Take 1 tablet by mouth daily 30 tablet 5    clobetasol (TEMOVATE) 0.05 % cream Apply topically 2 times daily.  60 g 5    simvastatin (ZOCOR) 10 MG tablet TAKE ONE TABLET BY MOUTH ONCE DAILY IN THE EVENING 90 tablet 3    Ascorbic Acid (VITAMIN C GUMMIE) 120 MG CHEW Take by mouth      ketorolac (ACULAR) 0.5 % ophthalmic solution USE 1 DROP IN THE OPERATIVE EYE TWICE DAILY      neomycin-polymyxin-dexameth (MAXITROL) 3.5-05693-2.1 ophthalmic suspension INSTILL 1 DROP IN THE OPERATIVE EYE EVERY DAY      traZODone (DESYREL) 50 MG tablet Take half tablet every night as needed. If no improvement after 1 week, increase to 1 whole tablet 30 tablet 1    sodium chloride (OCEAN, BABY AYR) 0.65 % nasal spray 2 sprays by Nasal route 3 times daily 1 Bottle 0    Vitamin D (CHOLECALCIFEROL) 50 MCG (2000 UT) TABS tablet Take 1 tablet by mouth daily 60 tablet 0    ibuprofen (ADVIL;MOTRIN) 800 MG tablet Take 1 tablet by mouth 2 times daily as needed for Pain (Patient not taking: Reported on 1/25/2023) 60 tablet 5     No current facility-administered medications for this visit. Review of Systems   Constitutional:  Negative for activity change, appetite change, chills, diaphoresis, fatigue, fever and unexpected weight change. Cardiovascular:  Negative for leg swelling. Musculoskeletal:  Positive for arthralgias (right leg pain). Neurological:  Negative for weakness and numbness. Objective:   /64 (Site: Right Upper Arm, Position: Sitting, Cuff Size: Large Adult)   Pulse 78   Temp 97 °F (36.1 °C) (Temporal)   Ht 5' 2\" (1.575 m) Comment: per pt  Wt 180 lb (81.6 kg) Comment: per pt  SpO2 98%   BMI 32.92 kg/m²     Physical Exam  Vitals reviewed. Constitutional:       General: She is awake. She is not in acute distress. Appearance: Normal appearance. She is well-developed, well-groomed and overweight. She is not ill-appearing, toxic-appearing or diaphoretic. HENT:      Head: Normocephalic and atraumatic. Right Ear: Hearing normal.      Left Ear: Hearing normal.      Mouth/Throat:      Lips: Pink. Eyes:      General: Lids are normal.      Extraocular Movements: Extraocular movements intact. Conjunctiva/sclera: Conjunctivae normal.   Cardiovascular:      Rate and Rhythm: Normal rate and regular rhythm. Pulses: Normal pulses.       Heart sounds: Normal heart sounds, S1 normal and S2 normal.   Pulmonary:      Effort: Pulmonary effort is normal.      Breath sounds: Normal breath sounds and air entry. Musculoskeletal:         General: Tenderness and signs of injury present. No swelling or deformity. Cervical back: Normal range of motion and neck supple. Right lower leg: No edema. Left lower leg: No edema. Legs:       Comments: Pain with ROM to the upper leg. No swelling, bruising, or redness noted. Pain with active ROM only, no pain with passive ROM. Skin:     General: Skin is warm and dry. Capillary Refill: Capillary refill takes less than 2 seconds. Findings: No erythema or rash. Neurological:      General: No focal deficit present. Mental Status: She is alert and oriented to person, place, and time. Mental status is at baseline. Psychiatric:         Attention and Perception: Attention and perception normal.         Mood and Affect: Mood and affect normal.         Speech: Speech normal.         Behavior: Behavior normal. Behavior is cooperative. Thought Content: Thought content normal.         Cognition and Memory: Cognition and memory normal.         Judgment: Judgment normal.       Assessment:       Diagnosis Orders   1. Right thigh pain  XR FEMUR RIGHT (MIN 2 VIEWS)        No results found for this visit on 01/25/23. Plan:     Assessment & Plan   Mady Gutierrez was seen today for fall. Diagnoses and all orders for this visit:    Right thigh pain  -     XR FEMUR RIGHT (MIN 2 VIEWS); Future    Will get XR. Discussed likely hip flexor or muscle strain given pain is only with active ROM. Discussed will call with XR results. Advised patient would consider PT/referral sports med if XR neg. Advised rest, ice, elevate, and NSAID as needed.     Orders Placed This Encounter   Procedures    XR FEMUR RIGHT (MIN 2 VIEWS)     Standing Status:   Future     Standing Expiration Date:   1/25/2024     Order Specific Question:   Reason for exam:     Answer:   Right thigh pain following fall 2 weeks ago, no better     No orders of the defined types were placed in this encounter.    There are no discontinued medications.  Return for Will call with results..        Reviewed with the patient/family: current clinical status & medications.  Side effects of the medication prescribed today, as well as treatment plan/rationale and result expectations have been discussed with the patient/family who expresses understanding. Patient will be discharged home in stable condition.    Follow up with PCP to evaluate treatment results or return if symptoms worsen or fail to improve. Discussed signs and symptoms which require immediate follow-up in ED/call to 911.  Understanding verbalized.     I have reviewed the patient's medical history in detail and updated the computerized patient record.    CARLOS CROSS, APRN - CNP

## 2023-01-30 DIAGNOSIS — M79.651 RIGHT THIGH PAIN: Primary | ICD-10-CM

## 2023-01-30 DIAGNOSIS — R10.31 RIGHT GROIN PAIN: ICD-10-CM

## 2023-01-31 ENCOUNTER — INITIAL CONSULT (OUTPATIENT)
Dept: SPORTS MEDICINE | Age: 73
End: 2023-01-31
Payer: MEDICARE

## 2023-01-31 VITALS
HEIGHT: 62 IN | RESPIRATION RATE: 18 BRPM | BODY MASS INDEX: 33.13 KG/M2 | DIASTOLIC BLOOD PRESSURE: 84 MMHG | TEMPERATURE: 96.8 F | WEIGHT: 180 LBS | SYSTOLIC BLOOD PRESSURE: 132 MMHG

## 2023-01-31 DIAGNOSIS — S76.911A MUSCLE STRAIN OF RIGHT THIGH, INITIAL ENCOUNTER: Primary | ICD-10-CM

## 2023-01-31 PROCEDURE — 3017F COLORECTAL CA SCREEN DOC REV: CPT | Performed by: FAMILY MEDICINE

## 2023-01-31 PROCEDURE — 1090F PRES/ABSN URINE INCON ASSESS: CPT | Performed by: FAMILY MEDICINE

## 2023-01-31 PROCEDURE — G8417 CALC BMI ABV UP PARAM F/U: HCPCS | Performed by: FAMILY MEDICINE

## 2023-01-31 PROCEDURE — G8484 FLU IMMUNIZE NO ADMIN: HCPCS | Performed by: FAMILY MEDICINE

## 2023-01-31 PROCEDURE — 99204 OFFICE O/P NEW MOD 45 MIN: CPT | Performed by: FAMILY MEDICINE

## 2023-01-31 PROCEDURE — 1123F ACP DISCUSS/DSCN MKR DOCD: CPT | Performed by: FAMILY MEDICINE

## 2023-01-31 PROCEDURE — 1036F TOBACCO NON-USER: CPT | Performed by: FAMILY MEDICINE

## 2023-01-31 PROCEDURE — G8399 PT W/DXA RESULTS DOCUMENT: HCPCS | Performed by: FAMILY MEDICINE

## 2023-01-31 PROCEDURE — G8427 DOCREV CUR MEDS BY ELIG CLIN: HCPCS | Performed by: FAMILY MEDICINE

## 2023-01-31 ASSESSMENT — ENCOUNTER SYMPTOMS
DIARRHEA: 0
BACK PAIN: 0
SHORTNESS OF BREATH: 0
CONSTIPATION: 0
NAUSEA: 0

## 2023-01-31 NOTE — PROGRESS NOTES
[facility]  [address], [address]  [place], [place]  P: [phone]  F: [phone]      Lenora Dubose  (1950)    1/31/2023    Subjective:     Lenora Dubose is 72 y.o. female who complains today of:    Chief Complaint   Patient presents with    Consultation    Pain     Right thigh and groin    Hand Pain     Right     Leg Pain     Right        HPI     This patient comes in complaining of 3 weeks of right inner to mid thigh pain which started after a fall she says that she feels like she can walk without pain however making certain maneuvers still bothers her so she is had x-rays done and is here today for further evaluation and treatment  Allergies:  Lidocaine, Novocain [procaine], and Shrimp (diagnostic)    Past Medical History:   Diagnosis Date    Acid reflux     Chronic back pain     COVID-19 06/2021    Hyperlipidemia     Hypertension     Osteoarthritis     shots in knees by ortho     Past Surgical History:   Procedure Laterality Date    APPENDECTOMY  1959    BREAST BIOPSY      BREAST SURGERY      HYSTERECTOMY (CERVIX STATUS UNKNOWN)  1992    OVARY REMOVAL      AL COLON CA SCRN NOT HI [facility] IND N/A 5/9/2018    COLONOSCOPY performed by Nel Schneider MD at [facility]     History reviewed. No pertinent family history.  Social History     Socioeconomic History    Marital status:      Spouse name: Not on file    Number of children: Not on file    Years of education: Not on file    Highest education level: Not on file   Occupational History    Not on file   Tobacco Use    Smoking status: Never    Smokeless tobacco: Never   Substance and Sexual Activity    Alcohol use: Yes     Comment: rarely    Drug use: No    Sexual activity: Not on file   Other Topics Concern    Not on file   Social History Narrative    Not on file     Social Determinants of Health     Financial Resource Strain: Low Risk      Difficulty of Paying Living Expenses: Not hard at all   Food Insecurity: No Food Insecurity    Worried About Running Out of Food in the Last Year: Never true    Ran Out of Food in the Last Year: Never true   Transportation Needs: Not on file   Physical Activity: Sufficiently Active    Days of Exercise per Week: 6 days    Minutes of Exercise per Session: 30 min   Stress: Not on file   Social Connections: Not on file   Intimate Partner Violence: Not on file   Housing Stability: Not on file       Current Outpatient Medications on File Prior to Visit   Medication Sig Dispense Refill    ketoconazole (NIZORAL) 2 % shampoo Use 3 times weekly for 2 weeks. 1 each 5    ketoconazole (NIZORAL) 2 % cream Apply topically twice daily for at least 4 weeks (or for 1 week after lesions have healed). 1 each 5    mometasone (ELOCON) 0.1 % cream Apply topically daily. 50 g 11    traZODone (DESYREL) 150 MG tablet TAKE 1 TABLET BY MOUTH NIGHTLY AS NEEDED FOR SLEEP 30 tablet 5    Diaper Rash Products ([employer]S DIAPER) 1-10 % CREA APPLY TO AFFECTED AREA TWICE A DAY      amLODIPine (NORVASC) 2.5 MG tablet Take 1 tablet by mouth daily 90 tablet 3    Dimethicone-Zinc Oxide-Vit A-D ([employer]S ZINC OXIDE DIAPER) 1-10 % CREA Apply 1 applicator topically 2 times daily 113 g 5    ibuprofen (ADVIL;MOTRIN) 800 MG tablet Take 1 tablet by mouth 2 times daily as needed for Pain (Patient not taking: Reported on 1/25/2023) 60 tablet 5    Zinc Oxide (ELTA SEAL MOISTURE BARRIER) 6 % CREA Apply 1 applicator topically in the morning and at bedtime 114 g 0    pantoprazole (PROTONIX) 40 MG tablet Take 1 tablet by mouth daily 30 tablet 5    clobetasol (TEMOVATE) 0.05 % cream Apply topically 2 times daily.  60 g 5    simvastatin (ZOCOR) 10 MG tablet TAKE ONE TABLET BY MOUTH ONCE DAILY IN THE EVENING 90 tablet 3    Ascorbic Acid (VITAMIN C GUMMIE) 120 MG CHEW Take by mouth      ketorolac (ACULAR) 0.5 % ophthalmic solution USE 1 DROP IN THE OPERATIVE EYE TWICE DAILY neomycin-polymyxin-dexameth (MAXITROL) 3.5-43593-9.1 ophthalmic suspension INSTILL 1 DROP IN THE OPERATIVE EYE EVERY DAY      traZODone (DESYREL) 50 MG tablet Take half tablet every night as needed. If no improvement after 1 week, increase to 1 whole tablet 30 tablet 1    sodium chloride (OCEAN, BABY AYR) 0.65 % nasal spray 2 sprays by Nasal route 3 times daily 1 Bottle 0    Vitamin D (CHOLECALCIFEROL) 50 MCG (2000 UT) TABS tablet Take 1 tablet by mouth daily 60 tablet 0     No current facility-administered medications on file prior to visit. Review of Systems   Constitutional:  Negative for fever. HENT:  Negative for hearing loss. Respiratory:  Negative for shortness of breath. Gastrointestinal:  Negative for constipation, diarrhea and nausea. Genitourinary:  Negative for difficulty urinating. Musculoskeletal:  Negative for back pain and neck pain. Skin:  Negative for rash. Neurological:  Negative for headaches. Hematological:  Does not bruise/bleed easily. Psychiatric/Behavioral:  Negative for sleep disturbance. Objective:     Vitals:  /84   Temp 96.8 °F (36 °C)   Resp 18   Ht 5' 2\" (1.575 m)   Wt 180 lb (81.6 kg)   BMI 32.92 kg/m²        Physical Exam  Constitutional:       Appearance: Normal appearance. HENT:      Head: Normocephalic. Nose: No rhinorrhea. Mouth/Throat:      Pharynx: Oropharynx is clear. Eyes:      Pupils: Pupils are equal, round, and reactive to light. Cardiovascular:      Rate and Rhythm: Normal rate. Pulses: Normal pulses. Pulmonary:      Breath sounds: No wheezing. Abdominal:      Palpations: Abdomen is soft. Musculoskeletal:         General: No deformity. Cervical back: No rigidity. Lymphadenopathy:      Cervical: No cervical adenopathy. Skin:     General: Skin is warm and dry. Findings: No rash. Neurological:      Mental Status: She is alert and oriented to person, place, and time.    Psychiatric: Mood and Affect: Mood normal.         Behavior: Behavior normal.       Ortho Exam   Examination of the right thigh reveals the neurovascular status to be intact there is no edema or ecchymosis noted tenderness was noted mostly along the sartorius with pain with activation sartorius muscle no pain with activation of the quad musculature or hamstrings musculature patient is a normal gait  Rudimentary exam of the lumbar spine shows full range of motion no palpable tenderness neurovascular muscle status is intact    I reviewed x-rays of the right femur done on 1/27/2023    Assessment:      Diagnosis Orders   1. Muscle strain of right thigh, initial encounter      SARTORIUS          Plan:   Patient states she was sent for evaluation and treatment at this point I think she is done some injury to her sartorius muscle and I think she be best suited at this point for some physical therapy I did suggest a wrap to give her some compression over the area which should help with the pain and take ibuprofen also as needed for the pain she is to return in 3 weeks if she is no better at that point we will consider further testing       No orders of the defined types were placed in this encounter. No orders of the defined types were placed in this encounter. Follow up:  Return in about 3 weeks (around 2/21/2023).     MABLE LEONE DO

## 2023-02-09 ENCOUNTER — HOSPITAL ENCOUNTER (OUTPATIENT)
Dept: PHYSICAL THERAPY | Age: 73
Setting detail: THERAPIES SERIES
Discharge: HOME | End: 2023-02-09
Payer: MEDICARE

## 2023-02-09 PROCEDURE — 97162 PT EVAL MOD COMPLEX 30 MIN: CPT

## 2023-02-09 ASSESSMENT — PAIN SCALES - GENERAL: PAINLEVEL_OUTOF10: 1

## 2023-02-09 ASSESSMENT — PAIN DESCRIPTION - ORIENTATION: ORIENTATION: RIGHT

## 2023-02-09 ASSESSMENT — PAIN DESCRIPTION - DESCRIPTORS: DESCRIPTORS: SORE

## 2023-02-09 ASSESSMENT — PAIN DESCRIPTION - LOCATION: LOCATION: LEG

## 2023-02-09 NOTE — PROGRESS NOTES
[address]   [address]  Phone: [phone]                             Physical Therapy: Initial Evaluation    Patient: Malu Acosta (63 y.o.     female)   Examination Date: 2023   :  1950 ;    Confirmed: Yes MRN: [id number]  CSN: [id number]   Insurance: Payor: MEDICARE / Plan: MEDICARE PART A AND B / Product Type: *No Product type* /   Insurance ID: [id number] - (Medicare) PT Insurance Information: Medicare; Medical [place] Secondary Insurance (if applicable): MEDICAL [employer]   Referring Physician: Justo Billy DO      PCP: Yue Ortez MD Visits to Date/Visits Approved: 1 /  (BMN)    No Show/Cancelled Appts: 0 / 0     Medical Diagnosis: Muscle strain of right thigh, initial encounter [I96.681E]    Treatment Diagnosis: impaired LE strength, decreased hip flexor ROM, right LE pain     PERTINENT MEDICAL HISTORY   Patient Assessed for Rehabilitation Services: Yes       Medical History: Chart Reviewed: Yes   Past Medical History:   Diagnosis Date    Acid reflux     Chronic back pain     COVID-19 2021    Hyperlipidemia     Hypertension     Osteoarthritis     shots in knees by ortho     Surgical History:   Past Surgical History:   Procedure Laterality Date    APPENDECTOMY      BREAST BIOPSY      BREAST SURGERY      HYSTERECTOMY (CERVIX STATUS UNKNOWN)      OVARY REMOVAL      HI COLON CA SCRN NOT  W [address] [place] [address] N/A 2018    COLONOSCOPY performed by Everton Arthur MD at [facility]       Medications:   Current Outpatient Medications:     ketoconazole (NIZORAL) 2 % shampoo, Use 3 times weekly for 2 weeks. , Disp: 1 each, Rfl: 5    ketoconazole (NIZORAL) 2 % cream, Apply topically twice daily for at least 4 weeks (or for 1 week after lesions have healed). , Disp: 1 each, Rfl: 5    mometasone (ELOCON) 0.1 % cream, Apply topically daily. , Disp: 50 g, Rfl: 11    traZODone (DESYREL) 150 MG tablet, TAKE 1 TABLET BY MOUTH NIGHTLY AS NEEDED FOR SLEEP, Disp: 30 tablet, Rfl: 5    Diaper Rash Products ([employer] DIAPER) 1-10 % CREA, APPLY TO AFFECTED AREA TWICE A DAY, Disp: , Rfl:     amLODIPine (NORVASC) 2.5 MG tablet, Take 1 tablet by mouth daily, Disp: 90 tablet, Rfl: 3    Dimethicone-Zinc Oxide-Vit A-D ([employer] ZINC OXIDE DIAPER) 1-10 % CREA, Apply 1 applicator topically 2 times daily, Disp: 113 g, Rfl: 5    ibuprofen (ADVIL;MOTRIN) 800 MG tablet, Take 1 tablet by mouth 2 times daily as needed for Pain (Patient not taking: Reported on 1/25/2023), Disp: 60 tablet, Rfl: 5    Zinc Oxide (ELTA SEAL MOISTURE BARRIER) 6 % CREA, Apply 1 applicator topically in the morning and at bedtime, Disp: 114 g, Rfl: 0    pantoprazole (PROTONIX) 40 MG tablet, Take 1 tablet by mouth daily, Disp: 30 tablet, Rfl: 5    clobetasol (TEMOVATE) 0.05 % cream, Apply topically 2 times daily. , Disp: 60 g, Rfl: 5    simvastatin (ZOCOR) 10 MG tablet, TAKE ONE TABLET BY MOUTH ONCE DAILY IN THE EVENING, Disp: 90 tablet, Rfl: 3    Ascorbic Acid (VITAMIN C GUMMIE) 120 MG CHEW, Take by mouth, Disp: , Rfl:     ketorolac (ACULAR) 0.5 % ophthalmic solution, USE 1 DROP IN THE OPERATIVE EYE TWICE DAILY, Disp: , Rfl:     neomycin-polymyxin-dexameth (MAXITROL) 3.5-40066-1.1 ophthalmic suspension, INSTILL 1 DROP IN THE OPERATIVE EYE EVERY DAY, Disp: , Rfl:     traZODone (DESYREL) 50 MG tablet, Take half tablet every night as needed. If no improvement after 1 week, increase to 1 whole tablet, Disp: 30 tablet, Rfl: 1    sodium chloride (OCEAN, BABY AYR) 0.65 % nasal spray, 2 sprays by Nasal route 3 times daily, Disp: 1 Bottle, Rfl: 0    Vitamin D (CHOLECALCIFEROL) 50 MCG (2000 UT) TABS tablet, Take 1 tablet by mouth daily, Disp: 60 tablet, Rfl: 0  Allergies: Lidocaine, Novocain [procaine], and Shrimp (diagnostic)      SUBJECTIVE EXAMINATION      ,           Subjective History:  Onset Date:  (3-4 weeks ago)  Subjective: Patient reports right hand and right thigh pain after a falling in her dining room walking in the dark. Patient reports this happened 3-4 weeks ago. Increased pain with steps and getting into the car. Decreased pain with biofreeze and ibuprofen. Additional Pertinent Hx (if applicable):     Imaging: XR FEMUR RIGHT (MIN 2 VIEWS)    Result Date: 1/27/2023  EXAMINATION: XRAY VIEWS OF THE RIGHT FEMUR 1/27/2023 12:23 pm COMPARISON: None. HISTORY: ORDERING SYSTEM PROVIDED HISTORY: Right thigh pain TECHNOLOGIST PROVIDED HISTORY: Reason for exam:->Right thigh pain following fall 2 weeks ago, no better What reading provider will be dictating this exam?->CRC FINDINGS: There are no lytic or sclerotic bone lesions. The osseous structures appear intact. The joint spaces are maintained. The soft tissues appear unremarkable. No acute osseous abnormality.      Prior diagnostic testing[de-identified] X-ray  Previous treatments prior to current episode?: Medications      Learning/Language: Learning  Does the patient/guardian have any barriers to learning?: No barriers  What is the preferred language of the patient/guardian?: English  How does the patient/guardian prefer to learn new concepts?: Listening     Pain Screening    Pain Screening  Patient Currently in Pain: Yes  Pain Assessment: 0-10  Pain Level: 1  Worst Pain Level: 9  Pain Location: Leg  Pain Orientation: Right  Pain Descriptors: Sore    Functional Status    Social History:    Social History  Lives With:  (grand-daughter, great grandson)  Type of Home: House  Home Layout: One level  Home Access: Stairs to enter with rails  Entrance Stairs - Number of Steps: 2    Occupation/Interests:   Occupation: Retired    Prior Level of Function:     Independent        Current Level of Function:   independent      ADL Assistance: Independent  Ambulation Assistance: Independent  Transfer Assistance: Independent         OBJECTIVE EXAMINATION     Restrictions:          No restricitions    Review of Systems:  Vision: Within Functional Limits  Hearing: Within functional limits  Follows Commands: Within Functional Limits    Observations:   General Observations  General Observations: Yes  Posture:  (rounded shoulders)    Mobility:            Ambulation  Surface: Carpet  Device: No Device  Assistance: Independent  Gait Deviations: Slow Jaja  Distance: clinical distance    Neuro Screen: Sensation  Overall Sensation Status: WFL    Left Strength  Right Strength         Strength LLE  L Hip Flexion: 3+/5  L Hip Extension: 3+/5  L Hip ABduction: 3+/5  L Knee Flexion: 4+/5  L Knee Extension: 5/5  L Ankle Dorsiflexion: 5/5    Strength RLE  R Hip Flexion: 2+/5, 2/5  R Hip Extension: 2+/5  R Hip ABduction: 3+/5  R Knee Flexion: 4/5  R Knee Extension: 4+/5  R Ankle Dorsiflexion: 5/5       Special Tests:   Special Tests for Hip  Hip Special Tests Performed: Performed (bilateral Elys test positive)    Outcomes Score:  Exam: LEFS: 46/80         Treatment:    Exercises:   Exercises  Exercise 1: prone quad stretch with strap 30'' x 1, on right  Exercise 2: seated march x 10  Exercise 3: hip adduction with ball 5'' x 10, hip abduction YTB 5'' x 10  Exercise 4: supine march*  Exercise 5: heel slides on right*  Exercise 6: SLR (needs assist on right)*  Exercise 7: bridges*  Exercise 8: clamshells*  Exercise 9: bike*  Exercise 10: sink exercises*  Exercise 20: HEP: seated march, hip adduction with pillow, hip abduction with band, prone quad stretch     Modalities:Modalities: Yes  Moist Heat (CPT 04736)  Number Minutes Moist Heat: *       *Indicates exercise,modality, or manual techniques to be initiated when appropriate       ASSESSMENT     Impression: Assessment: Patient report right thigh pain after falling in the dark. Upon PT evaluation, patient demonstrates significant hip flexor weakness with impaired hip flexor ROM. Patient unable to perform SLR upon evaluation. Further PT recommended to improve strength and decrease pain for overall qulality of life.     Body Structures, Functions, Activity Limitations Requiring Skilled Therapeutic Intervention: Decreased ROM, Decreased strength, Decreased endurance, Increased pain    Statement of Medical Necessity: Physical Therapy is both indicated and medically necessary as outlined in the POC to increase the likelihood of meeting the functionally related goals stated below. Patient's Activity Tolerance:        Patient's rehabilitation potential/prognosis is considered to be: Good    Factors which may impact rehabilitation potential include: Age, Pain tolerance/management     Patient Education: Goals, PT Role, Plan of Care, Home Exercise Program      GOALS   Patient Goal(s): Patient Goals : \"decrease pain\"    Long Term Goals Completed by 4 weeks Goal Status   LTG 1 Patient will report 0/10 pain in right thigh with all functional movement. New   LTG 2 Patient will increase strength in bilateral LEs >/= 4+/5 to demonstrate functional improvements. New   LTG 3 LEFS >/= 56/80 to demonstrate functional improvements. New   LTG 4 Patient will be independent with HEP.  New     TREATMENT PLAN       Requires PT Follow-Up: Yes    Treatment may include any combination of the following: Strengthening, ROM, Endurance training, Neuromuscular re-education, Manual, Home exercise program, Patient/Caregiver education & training, Equipment evaluation, education, & procurement, Modalities     Frequency / Duration:  Patient to be seen 2 times per week for 4 weeks  Plan Comment:               Eval Complexity:   Decision Making: Medium Complexity  History: Personal Factors and/or Comorbidities Impacting POC: Medium  History: GERD, HTN, OA  Examination of body system(s) including body structures and functions, activity limitations, and/or participation restrictions: Medium  Exam: LEFS: 46/80  Clinical Presentation: Medium  Clinical Presentation: evolving  Clinical Decision Making : Medium Complexity    POST-PAIN     Pain Rating (0-10 pain scale):   1/10  Location and pain description same as pre-treatment unless indicated. Action: [] NA  [] Call Physician  [x] Perform HEP  [x] Meds as prescribed    Evaluation and patient rights have been reviewed and patient agrees with plan of care. Yes  [x]  No  []   Explain:     Jeannette Fall Risk Assessment  Risk Factor Scale  Score   History of Falls [x] Yes  [] No 25  0 25   Secondary Diagnosis [] Yes  [x] No 15  0 0   Ambulatory Aid [] Furniture  [] Crutches/cane/walker  [x] None/bedrest/wheelchair/nurse 30  15  0 0   IV/Heparin Lock [] Yes  [x] No 20  0 0   Gait/Transferring [] Impaired  [] Weak  [x] Normal/bedrest/immobile 20  10  0 0   Mental Status [] Forgets limitations  [x] Oriented to own ability 15  0 0      Total:25     Based on the Assessment score: check the appropriate box.   []  No intervention needed   Low =   Score of 0-24  [x]  Use standard prevention interventions Moderate =  Score of 24-44   [x] Discuss fall prevention strategies   [x] Indicate moderate falls risk on eval  []  Use high risk prevention interventions High = Score of 45 and higher   [] Discuss fall prevention strategies   [] Provide supervision during treatment time      Minutes:  PT Individual Minutes  Time In: 1030  Time Out: 1105  Minutes: 35  Timed Code Treatment Minutes: 5 Minutes  Procedure Minutes: 30 PT evaluation minutes     Timed Activity Minutes Units   Ther Ex 5 0   Manual          Electronically signed by Cee Bentley PT on 2/9/23 at 1:31 PM EST

## 2023-02-09 NOTE — PLAN OF CARE
[address]  [address]      VWAGE:[phone]    [x] Certification  [] Recertification [x]  Plan of Care  [] Progress Note [] Discharge      Referring Provider: Yogi Rivera DO     From:  Nay Ward, PT     Patient: Bob Khan (67 y.o. female) : 1950 Date: 2023   Medical Diagnosis: Muscle strain of right thigh, initial encounter [S76.911A]    Treatment Diagnosis: impaired LE strength, decreased hip flexor ROM, right LE pain    Plan of Care/Certification Expiration Date: 04/10/23   Progress Report Period from:  2023  to 2023    Visits to Date: 1 No Show: 0 Cancelled Appts: 0    OBJECTIVE:   Short Term Goals=Long term goals    Long Term Goals - Time Frame for Long Term Goals : 4 weeks  Goals Current/ Discharge status Status   Long Term Goal 1: Patient will report 0/10 pain in right thigh with all functional movement. Patient reports 1-9/10 pain in right thigh New   Long Term Goal 2: Patient will increase strength in bilateral LEs >/= 4+/5 to demonstrate functional improvements. Strength RLE  R Hip Flexion: 2+/5, 2/5  R Hip Extension: 2+/5  R Hip ABduction: 3+/5  R Knee Flexion: 4/5  R Knee Extension: 4+/5  R Ankle Dorsiflexion: 5/5  Strength LLE  L Hip Flexion: 3+/5  L Hip Extension: 3+/5  L Hip ABduction: 3+/5  L Knee Flexion: 4+/5  L Knee Extension: 5/5  L Ankle Dorsiflexion: 5/5            New   Long Term Goal 3: LEFS >/= 56/80 to demonstrate functional improvements. [address] Term Goal 4: Patient will be independent with HEP. Patient issued HEP. New     Body Structures, Functions, Activity Limitations Requiring Skilled Therapeutic Intervention: Decreased ROM, Decreased strength, Decreased endurance, Increased pain  Assessment: Patient report right thigh pain after falling in the dark. Upon PT evaluation, patient demonstrates significant hip flexor weakness with impaired hip flexor ROM.   Patient unable to perform SLR upon evaluation. Further PT recommended to improve strength and decrease pain for overall qulality of life. Therapy Prognosis: Good      PT Education: Goals;PT Role;Plan of Care;Home Exercise Program    PLAN: [x] Evaluate and Treat  Frequency/Duration:  Plan Frequency: 2  Plan weeks: 4  Current Treatment Recommendations: Strengthening, ROM, Endurance training, Neuromuscular re-education, Manual, Home exercise program, Patient/Caregiver education & training, Equipment evaluation, education, & procurement, Modalities  Modalities: Heat/Cold, Ultrasound     Precautions:                            Patient Status:[x] Continue/ Initiate plan of Care     [] Discharge PT. Recommend pt continue with HEP. [] Additional visits requested, Please re-certify for additional visits:     [] Hold         Signature: Electronically signed by Winifred Britt PT on 2/9/2023 at 1:34 PM      If you have any questions or concerns, please don't hesitate to call. Thank you for your referral.    I have reviewed this plan of care and certify a need for medically necessary rehabilitation services.     Physician Signature:__________________________________________________________  Date:  Please sign and return

## 2023-02-13 ENCOUNTER — HOSPITAL ENCOUNTER (OUTPATIENT)
Dept: PHYSICAL THERAPY | Age: 73
Setting detail: THERAPIES SERIES
Discharge: HOME | End: 2023-02-13
Payer: MEDICARE

## 2023-02-13 PROCEDURE — 97110 THERAPEUTIC EXERCISES: CPT

## 2023-02-13 NOTE — PROGRESS NOTES
[address] [facility] [address] [facility] [address]    [address]   BPQFF:[phone]   Treatment Note        Date: 2023  Patient: Armida Ko  : 1950   Confirmed: Yes  MRN: [id number]  Referring Provider: Seth Ochoa DO      Medical Diagnosis: Muscle strain of right thigh, initial encounter [X95.941W]      Treatment Diagnosis: impaired LE strength, decreased hip flexor ROM, right LE pain    Visit Information:  Insurance: Payor: MEDICARE / Plan: MEDICARE PART A AND B / Product Type: *No Product type* /   PT Visit Information  Onset Date:  (3-4 weeks ago)  PT Insurance Information: Medicare; Medical [place]  Total # of Visits Approved:  (BMN)  Total # of Visits to Date: 2  Plan of Care/Certification Expiration Date: 04/10/23  No Show: 0  Progress Note Due Date: 03/10/23  Canceled Appointment: 0  Progress Note Counter:  (PN due 3/10/23)    Subjective Information:  Subjective: Patient reports compliance with HEP 2x daily. States her ability to lift RLE when sitting has improved. Also notes pain intensity has lessened. Continues to have difficulty getting in/out of her car and going up/down stairs. HEP Compliance:  [x] Good [] Fair [] Poor [] Reports not doing due to:    Pain Screening  Patient Currently in Pain: Yes  Pain Assessment: 0-10    Treatment:  Exercises:  Exercises  Exercise 1: modified jessica stretch 3x30\"  Exercise 2: SAQ 3\" x10  Exercise 3: hip adduction with ball 5'' x 15, hip abduction YTB 5'' x 15 hook lying  Exercise 4: supine march x10  Exercise 5: heel slides on right 10\"x10  Exercise 6: SLR (needs assist on right) 2x5  Exercise 7: bridges 3\"x10- with towel  Exercise 8: clamshells x10 B    Assessment: Body Structures, Functions, Activity Limitations Requiring Skilled Therapeutic Intervention: Decreased ROM, Decreased strength, Decreased endurance, Increased pain  Assessment: Continued exercise progressions with focus on right hip flexor/quad strengthening. Patient tolerates progressions without reports of increased pain. Continues to require assistance for SLRs on right. Provided additional hip flexor stretch for HEP. Patient verbalizes good understanding. Treatment Diagnosis: impaired LE strength, decreased hip flexor ROM, right LE pain  Therapy Prognosis: Good          Post-Pain Assessment:       Pain Rating (0-10 pain scale):   0/10   Location and pain description same as pre-treatment unless indicated. Action: [] NA   [] Perform HEP  [] Meds as prescribed  [] Modalities as prescribed   [] Call Physician     GOALS   Patient Goal(s): Patient Goals : \"decrease pain\"        Long Term Goals Completed by 4 weeks Goal Status   LTG 1 Patient will report 0/10 pain in right thigh with all functional movement. In progress   LTG 2 Patient will increase strength in bilateral LEs >/= 4+/5 to demonstrate functional improvements. In progress   LTG 3 LEFS >/= 56/80 to demonstrate functional improvements. In progress   LTG 4 Patient will be independent with HEP. In progress     Plan:  Frequency/Duration:  Plan  Plan Frequency: 2  Plan weeks: 4  Current Treatment Recommendations: Strengthening, ROM, Endurance training, Neuromuscular re-education, Manual, Home exercise program, Patient/Caregiver education & training, Equipment evaluation, education, & procurement, Modalities  Modalities: Heat/Cold, Ultrasound  Pt to continue current HEP. See objective section for any therapeutic exercise changes, additions or modifications this date.     Therapy Time:      PT Individual Minutes  Time In: 4358  Time Out: 3121  Minutes: 42  Timed Code Treatment Minutes: 42 Minutes  Procedure Minutes:  Timed Activity Minutes Units   Ther Ex 42 3     Electronically signed by Efren Salmon PTA on 2/13/23 at 2:59 PM EST

## 2023-02-16 ENCOUNTER — HOSPITAL ENCOUNTER (OUTPATIENT)
Dept: PHYSICAL THERAPY | Age: 73
Setting detail: THERAPIES SERIES
Discharge: HOME | End: 2023-02-16
Payer: MEDICARE

## 2023-02-16 PROCEDURE — 97110 THERAPEUTIC EXERCISES: CPT

## 2023-02-16 ASSESSMENT — PAIN DESCRIPTION - DESCRIPTORS: DESCRIPTORS: SORE

## 2023-02-16 ASSESSMENT — PAIN DESCRIPTION - ORIENTATION: ORIENTATION: RIGHT

## 2023-02-16 ASSESSMENT — PAIN DESCRIPTION - LOCATION: LOCATION: LEG

## 2023-02-16 NOTE — PROGRESS NOTES
[address] [facility] [address] [facility] [address]   [place], [facility] [address] [facility] [address]  AUJOZ:[phone]      Physical TherapyTreatment Note        Date: 2023  Patient: Katie Rios  : 1950   Confirmed: Yes  MRN: [id number]  Referring Provider: Mesha Richardson DO      Medical Diagnosis: Muscle strain of right thigh, initial encounter [R24.186X]      Treatment Diagnosis: impaired LE strength, decreased hip flexor ROM, right LE pain    Visit Information:  Insurance: Payor: MEDICARE / Plan: MEDICARE PART A AND B / Product Type: *No Product type* /   PT Visit Information  Onset Date:  (3-4 weeks ago)  PT Insurance Information: Medicare; Medical [place]  Total # of Visits Approved:  (BMN)  Total # of Visits to Date: 3  Plan of Care/Certification Expiration Date: 04/10/23  No Show: 0  Progress Note Due Date: 03/10/23  Canceled Appointment: 0  Progress Note Counter: 3/8 (PN due 3/10/23)    Subjective Information:  Subjective: Pt reports that pain is pinching back pain. HEP Compliance:  [x] Good [] Fair [] Poor [] Reports not doing due to:    Pain Screening  Patient Currently in Pain: Yes  Pain Assessment: 0-10  Pain Level:  (pain with certain movemtent )  Pain Location: Leg  Pain Orientation: Right  Pain Descriptors: Sore    Treatment:  Exercises:  Exercises  Exercise 2: SAQ 3\" x15  Exercise 3: hip adduction with ball 5'' x 15, hip abduction YTB 5'' x 15 hook lying  Exercise 4: supine march x12  Exercise 5: heel slides on right 10\"x10 with ball under foot  Exercise 6: SLR (needs assist on right) 2x5  Exercise 7: bridges 3\"x10  Exercise 8: clamshells x10 B  Exercise 9: bike 4 min gentle        Assessment: Body Structures, Functions, Activity Limitations Requiring Skilled Therapeutic Intervention: Decreased ROM, Decreased strength, Decreased endurance, Increased pain  Assessment: Pt with cont difficulty with hip exercises due to decrease strength. Initiated bike to increase LE strength.   Treatment Diagnosis: impaired LE strength, decreased hip flexor ROM, right LE pain  Therapy Prognosis: Good          Post-Pain Assessment:       Pain Rating (0-10 pain scale):  0 /10   Location and pain description same as pre-treatment unless indicated. Action: [] NA   [x] Perform HEP  [] Meds as prescribed  [] Modalities as prescribed   [] Call Physician     GOALS   Patient Goal(s):      Long Term Goals Completed by 4 weeks Goal Status   LTG 1 Patient will report 0/10 pain in right thigh with all functional movement. In progress   LTG 2 Patient will increase strength in bilateral LEs >/= 4+/5 to demonstrate functional improvements. In progress   LTG 3 LEFS >/= 56/80 to demonstrate functional improvements. In progress   LTG 4 Patient will be independent with HEP. In progress       Plan:  Frequency/Duration:  Plan  Plan Frequency: 2  Plan weeks: 4  Current Treatment Recommendations: Strengthening, ROM, Endurance training, Neuromuscular re-education, Manual, Home exercise program, Patient/Caregiver education & training, Equipment evaluation, education, & procurement, Modalities  Modalities: Heat/Cold, Ultrasound  Pt to continue current HEP. See objective section for any therapeutic exercise changes, additions or modifications this date.     Therapy Time:      PT Individual Minutes  Time In: 2189  Time Out: 3965  Minutes: 42  Timed Code Treatment Minutes: 42 Minutes    Timed Activity Minutes Units   Ther Ex 42 3     Electronically signed by Treasure Castaneda PTA on 2/16/23 at 12:56 PM EST

## 2023-02-20 ENCOUNTER — HOSPITAL ENCOUNTER (OUTPATIENT)
Dept: PHYSICAL THERAPY | Age: 73
Setting detail: THERAPIES SERIES
Discharge: HOME | End: 2023-02-20
Payer: MEDICARE

## 2023-02-20 PROCEDURE — 97110 THERAPEUTIC EXERCISES: CPT

## 2023-02-20 PROCEDURE — 97032 APPL MODALITY 1+ESTIM EA 15: CPT

## 2023-02-20 ASSESSMENT — PAIN DESCRIPTION - DESCRIPTORS: DESCRIPTORS: SORE

## 2023-02-20 ASSESSMENT — PAIN DESCRIPTION - ORIENTATION: ORIENTATION: RIGHT

## 2023-02-20 ASSESSMENT — PAIN DESCRIPTION - LOCATION: LOCATION: LEG

## 2023-02-20 ASSESSMENT — PAIN SCALES - GENERAL: PAINLEVEL_OUTOF10: 3

## 2023-02-20 NOTE — PROGRESS NOTES
[facility], [place], [facility] [address]   [address]  [address]      Physical TherapyTreatment Note        Date: 2023  Patient: Lyman Seip  : 1950   Confirmed: Yes  MRN: [id number]  Referring Provider: Kenny Dutta DO      Medical Diagnosis: Muscle strain of right thigh, initial encounter [V94.999C]      Treatment Diagnosis: impaired LE strength, decreased hip flexor ROM, right LE pain    Visit Information:  Insurance: Payor: MEDICARE / Plan: MEDICARE PART A AND B / Product Type: *No Product type* /   PT Visit Information  Onset Date:  (3-4 weeks ago)  PT Insurance Information: Medicare; Medical [place]  Total # of Visits Approved:  (BMN)  Total # of Visits to Date: 3  Plan of Care/Certification Expiration Date: 04/10/23  No Show: 0  Progress Note Due Date: 03/10/23  Canceled Appointment: 0  Progress Note Counter: 3/8 (PN due 3/10/23)    Subjective Information:  Subjective: Pt reported that going up steps is easier for her. Stated lifting her R LE is still a challenge for her. Seated she can but laying down unable to lift her R leg. Pain levels are getting better.   HEP Compliance:  [x] Good [] Fair [] Poor [] Reports not doing due to:    Pain Screening  Patient Currently in Pain: Yes  Pain Assessment: 0-10  Pain Location: Leg  Pain Orientation: Right  Pain Descriptors: Sore    Treatment:  Exercises:  Exercises  Exercise 2: SAQ 3\" x15  Exercise 3: hip adduction with ball 5'' x 15,  Exercise 4: supine march x12  Exercise 5: heel slides on right 10\"x10 with ball under foot  Exercise 6: SLR (needs assist on right) 2x5  Exercise 7: bridges 3\"x10  Exercise 8: clamshells x10 B  Exercise 9: bike 4 min gentle- not completed     Modalities:  Moist Heat (CPT 00075)  Patient Position: L sidelying  Number Minutes Moist Heat: 10 mins  Moist heat location: Low back  Moist heat specified location: R hip and lumbar  Electric stimulation, unattended (CPT 40107) /  (Medicare)  Patient Position: L sidelying  E-stim location: Low back  E-stim specified location: x 10 m ins with hot pack. *Indicates exercise, modality, or manual techniques to be initiated when appropriate    Objective Measures:       LTG 1 Current Status[de-identified] Pain 3-6/10     Assessment: Body Structures, Functions, Activity Limitations Requiring Skilled Therapeutic Intervention: Decreased ROM, Decreased strength, Decreased endurance, Increased pain  Assessment: Pt with cont difficulty with hip exercises due to decrease strength. Initiated bike to increase LE strength. Treatment Diagnosis: impaired LE strength, decreased hip flexor ROM, right LE pain  Therapy Prognosis: Good      Post-Pain Assessment:       Pain Rating (0-10 pain scale):   2-3/10   Location and pain description same as pre-treatment unless indicated. Action: [] NA   [x] Perform HEP  [x] Meds as prescribed  [] Modalities as prescribed   [] Call Physician     GOALS   Patient Goal(s): Patient Goals : \"decrease pain\"    Long Term Goals Completed by 4 weeks Goal Status   LTG 1 Patient will report 0/10 pain in right thigh with all functional movement. In progress   LTG 2 Patient will increase strength in bilateral LEs >/= 4+/5 to demonstrate functional improvements. In progress   LTG 3 LEFS >/= 56/80 to demonstrate functional improvements. In progress   LTG 4 Patient will be independent with HEP. In progress     Plan:  Frequency/Duration:  Plan  Plan Frequency: 2  Plan weeks: 4  Current Treatment Recommendations: Strengthening, ROM, Endurance training, Neuromuscular re-education, Manual, Home exercise program, Patient/Caregiver education & training, Equipment evaluation, education, & procurement, Modalities  Modalities: Heat/Cold, Ultrasound  Pt to continue current HEP. See objective section for any therapeutic exercise changes, additions or modifications this date.     Therapy Time:      PT Individual Minutes  Time In: 3506  Time Out: 1115  Minutes: 55  Timed Code Treatment Minutes: 39 Minutes  Procedure Minutes:ifc lumbar/R hip x 10 mins with hot pack   Timed Activity Minutes Units   Ther Ex 39 3     Electronically signed by Joshua Whitman PTA on 2/20/23 at 10:40 AM EST

## 2023-02-23 ENCOUNTER — OFFICE VISIT (OUTPATIENT)
Dept: SPORTS MEDICINE | Age: 73
End: 2023-02-23
Payer: MEDICARE

## 2023-02-23 ENCOUNTER — HOSPITAL ENCOUNTER (OUTPATIENT)
Dept: PHYSICAL THERAPY | Age: 73
Setting detail: THERAPIES SERIES
Discharge: HOME | End: 2023-02-23
Payer: MEDICARE

## 2023-02-23 VITALS — BODY MASS INDEX: 34.04 KG/M2 | TEMPERATURE: 96.9 F | HEIGHT: 62 IN | WEIGHT: 185 LBS

## 2023-02-23 DIAGNOSIS — M16.11 PRIMARY OSTEOARTHRITIS OF RIGHT HIP: ICD-10-CM

## 2023-02-23 DIAGNOSIS — S76.911D MUSCLE STRAIN OF RIGHT THIGH, SUBSEQUENT ENCOUNTER: Primary | ICD-10-CM

## 2023-02-23 PROCEDURE — G8399 PT W/DXA RESULTS DOCUMENT: HCPCS | Performed by: FAMILY MEDICINE

## 2023-02-23 PROCEDURE — 3017F COLORECTAL CA SCREEN DOC REV: CPT | Performed by: FAMILY MEDICINE

## 2023-02-23 PROCEDURE — 1036F TOBACCO NON-USER: CPT | Performed by: FAMILY MEDICINE

## 2023-02-23 PROCEDURE — 1090F PRES/ABSN URINE INCON ASSESS: CPT | Performed by: FAMILY MEDICINE

## 2023-02-23 PROCEDURE — 99213 OFFICE O/P EST LOW 20 MIN: CPT | Performed by: FAMILY MEDICINE

## 2023-02-23 PROCEDURE — G8427 DOCREV CUR MEDS BY ELIG CLIN: HCPCS | Performed by: FAMILY MEDICINE

## 2023-02-23 PROCEDURE — G8484 FLU IMMUNIZE NO ADMIN: HCPCS | Performed by: FAMILY MEDICINE

## 2023-02-23 PROCEDURE — G8417 CALC BMI ABV UP PARAM F/U: HCPCS | Performed by: FAMILY MEDICINE

## 2023-02-23 PROCEDURE — 1123F ACP DISCUSS/DSCN MKR DOCD: CPT | Performed by: FAMILY MEDICINE

## 2023-02-23 PROCEDURE — 97110 THERAPEUTIC EXERCISES: CPT

## 2023-02-23 ASSESSMENT — ENCOUNTER SYMPTOMS
BACK PAIN: 0
NAUSEA: 0
CONSTIPATION: 0
SHORTNESS OF BREATH: 0
DIARRHEA: 0

## 2023-02-23 NOTE — PROGRESS NOTES
[facility] ([facility]) Physicians  Neurosurgery and Pain [facility]  [address], [address] [facility] , [address] [facility], [place] [address]: [phone]  F: [phone]      Lyman Seip  (1950)    2/23/2023    Subjective:     Lyman Seip is 67 y.o. female who complains today of:    Chief Complaint   Patient presents with    Follow-up     3 week     Leg Pain     Muscle strain of right thigh       HPI     Patient returns stating that she is feeling better still has some difficulties lifting her leg but is doing much better than prior to therapy she still going to therapy  Allergies:  Lidocaine, Novocain [procaine], and Shrimp (diagnostic)    Past Medical History:   Diagnosis Date    Acid reflux     Chronic back pain     COVID-19 06/2021    Hyperlipidemia     Hypertension     Osteoarthritis     shots in knees by ortho     Past Surgical History:   Procedure Laterality Date    APPENDECTOMY  1959    BREAST BIOPSY      BREAST SURGERY      HYSTERECTOMY (CERVIX STATUS UNKNOWN)  [place] 58 [place] SCRN NOT  [address] [place] [address] N/A 5/9/2018    COLONOSCOPY performed by Deborah Beckham MD at [address] [place] [address]     History reviewed. No pertinent family history.   Social History     Socioeconomic History    Marital status:      Spouse name: Not on file    Number of children: Not on file    Years of education: Not on file    Highest education level: Not on file   Occupational History    Not on file   Tobacco Use    Smoking status: Never    Smokeless tobacco: Never   Substance and Sexual Activity    Alcohol use: Yes     Comment: rarely    Drug use: No    Sexual activity: Not on file   Other Topics Concern    Not on file   Social History Narrative    Not on file     Social Determinants of Health     Financial Resource Strain: Low Risk     Difficulty of Paying Living Expenses: Not hard at all   Food Insecurity: No Food Insecurity    Worried About [facility] in the Last Year: Never true    Ran Out of Food in the Last Year: Never true   Transportation Needs: Not on file   Physical Activity: Sufficiently Active    Days of Exercise per Week: 6 days    Minutes of Exercise per Session: 30 min   Stress: Not on file   Social Connections: Not on file   Intimate Partner Violence: Not on file   Housing Stability: Not on file       Current Outpatient Medications on File Prior to Visit   Medication Sig Dispense Refill    ketoconazole (NIZORAL) 2 % shampoo Use 3 times weekly for 2 weeks. 1 each 5    ketoconazole (NIZORAL) 2 % cream Apply topically twice daily for at least 4 weeks (or for 1 week after lesions have healed). 1 each 5    mometasone (ELOCON) 0.1 % cream Apply topically daily. 50 g 11    traZODone (DESYREL) 150 MG tablet TAKE 1 TABLET BY MOUTH NIGHTLY AS NEEDED FOR SLEEP 30 tablet 5    Diaper Rash Products ([employer] DIAPER) 1-10 % CREA APPLY TO AFFECTED AREA TWICE A DAY      amLODIPine (NORVASC) 2.5 MG tablet Take 1 tablet by mouth daily 90 tablet 3    Dimethicone-Zinc Oxide-Vit A-D ([employer] ZINC OXIDE DIAPER) 1-10 % CREA Apply 1 applicator topically 2 times daily 113 g 5    ibuprofen (ADVIL;MOTRIN) 800 MG tablet Take 1 tablet by mouth 2 times daily as needed for Pain (Patient not taking: Reported on 1/25/2023) 60 tablet 5    Zinc Oxide (ELTA SEAL MOISTURE BARRIER) 6 % CREA Apply 1 applicator topically in the morning and at bedtime 114 g 0    pantoprazole (PROTONIX) 40 MG tablet Take 1 tablet by mouth daily 30 tablet 5    clobetasol (TEMOVATE) 0.05 % cream Apply topically 2 times daily.  60 g 5    simvastatin (ZOCOR) 10 MG tablet TAKE ONE TABLET BY MOUTH ONCE DAILY IN THE EVENING 90 tablet 3    Ascorbic Acid (VITAMIN C GUMMIE) 120 MG CHEW Take by mouth      ketorolac (ACULAR) 0.5 % ophthalmic solution USE 1 DROP IN THE OPERATIVE EYE TWICE DAILY      neomycin-polymyxin-dexameth (MAXITROL) 3.5-85951-9.1 ophthalmic suspension INSTILL 1 DROP IN THE OPERATIVE EYE EVERY DAY      traZODone (DESYREL) 50 MG tablet Take half tablet every night as needed. If no improvement after 1 week, increase to 1 whole tablet 30 tablet 1    sodium chloride (OCEAN, BABY AYR) 0.65 % nasal spray 2 sprays by Nasal route 3 times daily 1 Bottle 0    Vitamin D (CHOLECALCIFEROL) 50 MCG (2000 UT) TABS tablet Take 1 tablet by mouth daily 60 tablet 0     No current facility-administered medications on file prior to visit. Review of Systems   Constitutional:  Negative for fever. HENT:  Negative for hearing loss. Respiratory:  Negative for shortness of breath. Gastrointestinal:  Negative for constipation, diarrhea and nausea. Genitourinary:  Negative for difficulty urinating. Musculoskeletal:  Negative for back pain and neck pain. Skin:  Negative for rash. Neurological:  Negative for headaches. Hematological:  Does not bruise/bleed easily. Psychiatric/Behavioral:  Negative for sleep disturbance. Objective:     Vitals:  Temp 96.9 °F (36.1 °C)   Ht 5' 2\" (1.575 m)   Wt 185 lb (83.9 kg)   BMI 33.84 kg/m² Pain Score:   0 - No pain      Physical Exam  Vitals reviewed. Constitutional:       Appearance: Normal appearance. Skin:     General: Skin is warm and dry. Neurological:      Mental Status: She is alert. Ortho Exam   Examination of the right thigh and hip show good range of motion still some pain with activation of the sartorius muscle good strength throughout the thigh and hip region neurovascular muscle status is intact    Assessment:      Diagnosis Orders   1. Muscle strain of right thigh, subsequent encounter      Sartorius      2. Primary osteoarthritis of right hip            Plan:   Patient is improving at a good pace and want to continue with physical therapy see her back in 3 weeks       No orders of the defined types were placed in this encounter. No orders of the defined types were placed in this encounter. Follow up:  Return in about 3 weeks (around 3/16/2023).     MABLE LEONE, DO

## 2023-02-23 NOTE — PROGRESS NOTES
[facility] [address] [facility] [address]    [address]   IMOMT:[phone]   Treatment Note        Date: 2023  Patient: Mayra Vazquez  : 1950   Confirmed: Yes  MRN: [id number]  Referring Provider: Jacqueline Armenta DO      Medical Diagnosis: Muscle strain of right thigh, initial encounter [C94.509K]      Treatment Diagnosis: impaired LE strength, decreased hip flexor ROM, right LE pain    Visit Information:  Insurance: Payor: MEDICARE / Plan: MEDICARE PART A AND B / Product Type: *No Product type* /   PT Visit Information  Onset Date:  (3-4 weeks ago)  PT Insurance Information: Medicare; Medical [place]  Total # of Visits Approved:  (BMN)  Total # of Visits to Date: 4  Plan of Care/Certification Expiration Date: 04/10/23  No Show: 0  Progress Note Due Date: 03/10/23  Canceled Appointment: 0  Progress Note Counter:  (PN due 3/10/23)    Subjective Information:  Subjective: Patient reports stairs seem to be getting easier however notes she is still taking one step at a time. Continues to have to assist R LE into car due to pain/weakness. HEP Compliance:  [x] Good [] Fair [] Poor [] Reports not doing due to:    Pain Screening  Patient Currently in Pain: Denies    Treatment:  Exercises:  Exercises  Exercise 1: modified jessica stretch 3x30\"  Exercise 2: SAQ 3\" x15 2.5 # ankle weight  Exercise 6: SLR attempted patient unable to tolerate this date. Exercise 10: sink exercises x10 ea   Assessment: Body Structures, Functions, Activity Limitations Requiring Skilled Therapeutic Intervention: Decreased ROM, Decreased strength, Decreased endurance, Increased pain  Assessment: Introduced sink exercises for bilateral LE strengthening. Patient requires cues to correct posture. C/o increased discomfort with hip flexion. Unable to tolerate SLR on Right with assistance. Request to leave early another obligation.   Treatment Diagnosis: impaired LE strength, decreased hip flexor ROM, right LE pain  Therapy Prognosis: Good    Post-Pain Assessment:       Pain Rating (0-10 pain scale):   0/10   Location and pain description same as pre-treatment unless indicated. Action: [] NA   [] Perform HEP  [] Meds as prescribed  [] Modalities as prescribed   [] Call Physician     GOALS   Patient Goal(s): Patient Goals : \"decrease pain\"        Long Term Goals Completed by 4 weeks Goal Status   LTG 1 Patient will report 0/10 pain in right thigh with all functional movement. In progress   LTG 2 Patient will increase strength in bilateral LEs >/= 4+/5 to demonstrate functional improvements. In progress   LTG 3 LEFS >/= 56/80 to demonstrate functional improvements. In progress   LTG 4 Patient will be independent with HEP. In progress       Plan:  Frequency/Duration:  Plan  Plan Frequency: 2  Plan weeks: 4  Current Treatment Recommendations: Strengthening, ROM, Endurance training, Neuromuscular re-education, Manual, Home exercise program, Patient/Caregiver education & training, Equipment evaluation, education, & procurement, Modalities  Modalities: Heat/Cold, Ultrasound  Pt to continue current HEP. See objective section for any therapeutic exercise changes, additions or modifications this date.     Therapy Time:      PT Individual Minutes  Time In: 1350  Time Out: 8161  Minutes: 25  Timed Code Treatment Minutes: 25 Minutes  Procedure Minutes:0  Timed Activity Minutes Units   Ther Ex 25 2     Electronically signed by Anat Ramey PTA on 2/23/23 at 3:00 PM EST

## 2023-02-27 ENCOUNTER — HOSPITAL ENCOUNTER (OUTPATIENT)
Dept: PHYSICAL THERAPY | Age: 73
Setting detail: THERAPIES SERIES
Discharge: HOME | End: 2023-02-27
Payer: MEDICARE

## 2023-02-27 PROCEDURE — 97110 THERAPEUTIC EXERCISES: CPT

## 2023-02-27 NOTE — PROGRESS NOTES
[facility] [address]   [place], [address]  YGFWU:[phone]      Physical TherapyTreatment Note        Date: 2023  Patient: Nichol Lemons  : 1950   Confirmed: Yes  MRN: [id number]  Referring Provider: Sisi Guzman DO      Medical Diagnosis: Muscle strain of right thigh, initial encounter [V27.839V]      Treatment Diagnosis: impaired LE strength, decreased hip flexor ROM, right LE pain    Visit Information:  Insurance: Payor: MEDICARE / Plan: MEDICARE PART A AND B / Product Type: *No Product type* /   PT Visit Information  Onset Date:  (3-4 weeks ago)  PT Insurance Information: Medicare; Medical [place]  Total # of Visits Approved:  (BMN)  Total # of Visits to Date: 5  Plan of Care/Certification Expiration Date: 04/10/23  No Show: 0  Progress Note Due Date: 03/10/23  Canceled Appointment: 0  Progress Note Counter:  (PN due 3/10/23)    Subjective Information:  Subjective: Pt reports that she almost fell yesterday going up the [employer]. A littl emore sore today from that. HEP Compliance:  [x] Good [] Fair [] Poor [] Reports not doing due to:    Pain Screening  Patient Currently in Pain: Denies  Pain Assessment: 0-10    Treatment:  Exercises:  Exercises  Exercise 1: hip abduction x10  Exercise 2: LAQ [id number]  Exercise 3: hip adduction with ball 5'' x 15,  Exercise 4: step ups 8 inch x10  Exercise 5: side lying hip flecion x10  Exercise 6: SLR AAROM x10  Exercise 7: supine march x10  Exercise 8: clamshells x10 B  Exercise 9: fwd walk with YTB on 2 laps. Exercise 10: sink exercises x15 ea      Assessment: Body Structures, Functions, Activity Limitations Requiring Skilled Therapeutic Intervention: Decreased ROM, Decreased strength, Decreased endurance, Increased pain  Assessment: Focused treatment on hip flexor strength. Trialed SLR in different positions to see if pt able to. Pt able to perform hip flexion S/l.   Treatment Diagnosis: impaired LE strength, decreased hip flexor ROM, right LE pain  Therapy Prognosis: Good    Post-Pain Assessment:       Pain Rating (0-10 pain scale):  0 /10   Location and pain description same as pre-treatment unless indicated. Action: [] NA   [x] Perform HEP  [] Meds as prescribed  [] Modalities as prescribed   [] Call Physician     GOALS   Patient Goal(s):      Long Term Goals Completed by 4 weeks Goal Status   LTG 1 Patient will report 0/10 pain in right thigh with all functional movement. In progress   LTG 2 Patient will increase strength in bilateral LEs >/= 4+/5 to demonstrate functional improvements. In progress   LTG 3 LEFS >/= 56/80 to demonstrate functional improvements. In progress   LTG 4 Patient will be independent with HEP. In progress     Plan:  Frequency/Duration:  Plan  Plan Frequency: 2  Plan weeks: 4  Current Treatment Recommendations: Strengthening, ROM, Endurance training, Neuromuscular re-education, Manual, Home exercise program, Patient/Caregiver education & training, Equipment evaluation, education, & procurement, Modalities  Modalities: Heat/Cold, Ultrasound  Pt to continue current HEP. See objective section for any therapeutic exercise changes, additions or modifications this date.     Therapy Time:      PT Individual Minutes  Time In: 1021  Time Out: 9517  Minutes: 44  Timed Code Treatment Minutes: 44 Minutes    Timed Activity Minutes Units   Ther Ex 44 3     Electronically signed by Fermin Serrano PTA on 2/27/23 at 11:46 AM EST

## 2023-03-02 ENCOUNTER — HOSPITAL ENCOUNTER (OUTPATIENT)
Dept: PHYSICAL THERAPY | Age: 73
Setting detail: THERAPIES SERIES
Discharge: HOME | End: 2023-03-02
Payer: MEDICARE

## 2023-03-02 PROCEDURE — G0283 ELEC STIM OTHER THAN WOUND: HCPCS

## 2023-03-02 PROCEDURE — 97110 THERAPEUTIC EXERCISES: CPT

## 2023-03-02 ASSESSMENT — PAIN DESCRIPTION - PAIN TYPE: TYPE: CHRONIC PAIN

## 2023-03-02 ASSESSMENT — PAIN DESCRIPTION - LOCATION: LOCATION: LEG

## 2023-03-02 ASSESSMENT — PAIN SCALES - GENERAL: PAINLEVEL_OUTOF10: 3

## 2023-03-02 ASSESSMENT — PAIN DESCRIPTION - DESCRIPTORS: DESCRIPTORS: SORE

## 2023-03-02 ASSESSMENT — PAIN DESCRIPTION - ORIENTATION: ORIENTATION: RIGHT

## 2023-03-02 NOTE — PROGRESS NOTES
[address]   [place] [address]  Phone:[phone]      Physical TherapyTreatment Note        Date: 3/2/2023  Patient: Lenora uDbose  : 1950   Confirmed: Yes  MRN: [id number]  Referring Provider: Sukhjinder Lemon DO      Medical Diagnosis: Muscle strain of right thigh, initial encounter [X64.431T]      Treatment Diagnosis: impaired LE strength, decreased hip flexor ROM, right LE pain    Visit Information:  Insurance: Payor: MEDICARE / Plan: MEDICARE PART A AND B / Product Type: *No Product type* /   PT Visit Information  Onset Date:  (3-4 weeks ago)  PT Insurance Information: Medicare; Medical [place]  Total # of Visits Approved:  (BMN)  Total # of Visits to Date: 6  Plan of Care/Certification Expiration Date: 04/10/23  No Show: 0  Progress Note Due Date: 03/10/23  Canceled Appointment: 0  Progress Note Counter:  (PN due 3/10/23)    Subjective Information:  Subjective: Pt reports R hip/thigh increase pain and weakness.   Challenge for her to walk and to function.  HEP Compliance:  [x] Good [] Fair [] Poor [] Reports not doing due to:    Pain Screening  Patient Currently in Pain: Yes  Pain Assessment: 0-10  Pain Level: 3  Pain Type: Chronic pain  Pain Location: Leg  Pain Orientation: Right  Pain Descriptors: Sore    Treatment:  Exercises:  Exercises  Exercise 2: LAQ [id number] on table  focus on quad sets  Exercise 3: hip adduction with ball 5'' x 15- seated  Exercise 4: step ups 8 inch :   transisitoned to stairs  Recip up/down with UE support and CGA:   R and L lateral with UE support and CGA. VC's for correct LE alignment.  Exercise 5: side lying hip abuction to flex to neutral-down  x10 pausing 2-3 secs in between movements.  Exercise 6: BBD standing hip flex/abd/neurtal down  backwards L  x 5 each leg VC's 3 sec pause in between movments. with UE support  Exercise 7: standing on BBD hip circles cw/ccw x 10 each .     L SL hip [place] CW/CCW x 5 each -challenge     Modalities:  Cryotherapy (CPT 78918)  Patient Position: L sidelying  Number Minutes Cryotherapy: x10 mins with ifc tx  Cryotherapy location: Right, Low back  Electric stimulation, unattended (CPT 14464) /  (Medicare)  Patient Position: L sidelying  E-stim location: Low back, Right  E-stim specified location: x 10 m ins with cold pack     *Indicates exercise, modality, or manual techniques to be initiated when appropriate    Objective Measures:       LTG 1 Current Status[de-identified] pain levels 3/2/23:   3-5/10     Assessment: Body Structures, Functions, Activity Limitations Requiring Skilled Therapeutic Intervention: Decreased ROM, Decreased strength, Decreased endurance, Increased pain  Assessment: Pt able progress with  stairs recipricating, completed forward and R/L lateral steps with UE support. VC's to complete steps correctly with CGA. Progressed with standing hip flexion exercises to improve LE strength to reach LTG's. IFC applied to L and R thigh area with cold pack to help manage pain levels. Relief noted post tx. Treatment Diagnosis: impaired LE strength, decreased hip flexor ROM, right LE pain  Therapy Prognosis: Good        Post-Pain Assessment:       Pain Rating (0-10 pain scale):   2-3/10   Location and pain description same as pre-treatment unless indicated. Action: [] NA   [x] Perform HEP  [x] Meds as prescribed  [] Modalities as prescribed   [] Call Physician     GOALS   Patient Goal(s): Patient Goals : \"decrease pain\"    Long Term Goals Completed by 4 weeks Goal Status   LTG 1 Patient will report 0/10 pain in right thigh with all functional movement. In progress   LTG 2 Patient will increase strength in bilateral LEs >/= 4+/5 to demonstrate functional improvements. In progress   LTG 3 LEFS >/= 56/80 to demonstrate functional improvements. In progress   LTG 4 Patient will be independent with HEP.  In progress     Plan:  Frequency/Duration:  Plan  Plan Frequency: 2  Plan weeks: 4  Current Treatment Recommendations: Strengthening, ROM, Endurance training, Neuromuscular re-education, Manual, Home exercise program, Patient/Caregiver education & training, Equipment evaluation, education, & procurement, Modalities  Modalities: Heat/Cold, Ultrasound  Pt to continue current HEP. See objective section for any therapeutic exercise changes, additions or modifications this date.     Therapy Time:      PT Individual Minutes  Time In: 8296  Time Out: 1115  Minutes: 52  Timed Code Treatment Minutes: 39 Minutes  Procedure Minutes:ifc with cold pack to R hip and thigh x 10 mins L SL  Timed Activity Minutes Units   Ther Ex 39 3     Electronically signed by Samina Sesay PTA on 3/2/23 at 11:21 AM EST

## 2023-03-06 ENCOUNTER — HOSPITAL ENCOUNTER (OUTPATIENT)
Dept: PHYSICAL THERAPY | Age: 73
Setting detail: THERAPIES SERIES
Discharge: HOME | End: 2023-03-06
Payer: MEDICARE

## 2023-03-06 PROCEDURE — G0283 ELEC STIM OTHER THAN WOUND: HCPCS

## 2023-03-06 PROCEDURE — 97110 THERAPEUTIC EXERCISES: CPT

## 2023-03-06 ASSESSMENT — PAIN DESCRIPTION - ORIENTATION: ORIENTATION: RIGHT

## 2023-03-06 ASSESSMENT — PAIN DESCRIPTION - LOCATION: LOCATION: LEG;BACK

## 2023-03-06 ASSESSMENT — PAIN SCALES - GENERAL: PAINLEVEL_OUTOF10: 2

## 2023-03-06 ASSESSMENT — PAIN DESCRIPTION - DESCRIPTORS: DESCRIPTORS: SORE

## 2023-03-06 NOTE — PROGRESS NOTES
[facility], [address] [facility] [address]   [facility], [address]  VBRQT:[phone]      Physical TherapyTreatment Note        Date: 3/6/2023  Patient: Diane Clinton  : 1950   Confirmed: Yes  MRN: [id number]  Referring Provider: Joe Santamaria DO      Medical Diagnosis: Muscle strain of right thigh, initial encounter [Q98.919E]      Treatment Diagnosis: impaired LE strength, decreased hip flexor ROM, right LE pain    Visit Information:  Insurance: Payor: MEDICARE / Plan: MEDICARE PART A AND B / Product Type: *No Product type* /   PT Visit Information  Onset Date:  (3-4 weeks ago)  PT Insurance Information: Medicare; [employer]  Total # of Visits Approved:  (BMN)  Total # of Visits to Date: 7  Plan of Care/Certification Expiration Date: 04/10/23  No Show: 0  Progress Note Due Date: 03/10/23  Canceled Appointment: 0  Progress Note Counter:  (PN due 3/10/23)    Subjective Information:  Subjective: Pt reported R hip/thigh pain jeet a 2/10. [place] the exercises from last visit. Pt walking lately and feeling much better, with less pain. HEP Compliance:  [x] Good [] Fair [] Poor [] Reports not doing due to:    Pain Screening  Patient Currently in Pain: Yes  Pain Assessment: 0-10  Pain Level: 2  Pain Location: Leg, Back  Pain Orientation: Right  Pain Descriptors: Sore    Treatment:  Exercises:  Exercises  Exercise 2: LAQ 8uple88- on table  focus on quad sets with blue tband around ankles. Exercise 3: seated hamstring curls x 10 blue tband  Exercise 4: Recip up/down with UE support and CGA:   R and L lateral with UE support and CGA. VC's for correct LE alignment. Exercise 5: sidelying hip abuction to flex to neutral-down  x10 pausing 2-3 secs in between movements. Exercise 6: BBD standing hip flex/abd/neurtal down/ hip/knee flexion   L  x 5 each leg VC's 3 sec pause in between movments.  with UE support  Exercise 7: L SL hip [place] CW/CCW x 10each  Exercise 8: SLR  2 x 5 each  supine 3 secs R L x 10 3 secs     Modalities:  Moist Heat (CPT 10270)  Patient Position: L sidelying  Number Minutes Moist Heat: 10 mins  Moist heat location: Low back, Right  Moist heat specified location: R hip and lumbar x10 mins with ifc  Electric stimulation, unattended (CPT 98692) /  (Medicare)  Patient Position: L sidelying  E-stim location: Right, Low back  E-stim specified location: x10 mins with hot pack     *Indicates exercise, modality, or manual techniques to be initiated when appropriate    Objective Measures:         LTG 1 Current Status[de-identified] pain levels 3/6/23:   2/10     Assessment: Body Structures, Functions, Activity Limitations Requiring Skilled Therapeutic Intervention: Decreased ROM, Decreased strength, Decreased endurance, Increased pain  Assessment: Pt able to continue with LE strengthening LE's. Completed SLR 2 sets of 5 unassisted. Pt able to continue to recip stairs with 1 UE support descending. Pt continues to improve working towards LTG's. IFC applied to lumbar/R hip/thigh with hot packs to help manage R hip discomfort post active therex. No increase pain noted post tx. Treatment Diagnosis: impaired LE strength, decreased hip flexor ROM, right LE pain  Therapy Prognosis: Good      Post-Pain Assessment:       Pain Rating (0-10 pain scale):   1-2/10   Location and pain description same as pre-treatment unless indicated. Action: [] NA   [x] Perform HEP  [x] Meds as prescribed  [] Modalities as prescribed   [] Call Physician     GOALS   Patient Goal(s): Patient Goals : \"decrease pain\"    Long Term Goals Completed by 4 weeks Goal Status   LTG 1 Patient will report 0/10 pain in right thigh with all functional movement. In progress   LTG 2 Patient will increase strength in bilateral LEs >/= 4+/5 to demonstrate functional improvements. In progress   LTG 3 LEFS >/= 56/80 to demonstrate functional improvements. In progress   LTG 4 Patient will be independent with HEP.  In progress Plan:  Frequency/Duration:  Plan  Plan Frequency: 2  Plan weeks: 4  Current Treatment Recommendations: Strengthening, ROM, Endurance training, Neuromuscular re-education, Manual, Home exercise program, Patient/Caregiver education & training, Equipment evaluation, education, & procurement, Modalities  Modalities: Heat/Cold, Ultrasound  Pt to continue current HEP. See objective section for any therapeutic exercise changes, additions or modifications this date.     Therapy Time:      PT Individual Minutes  Time In: 1102  Time Out: 7188  Minutes: 52  Timed Code Treatment Minutes: 38 Minutes  Procedure Minutes:IFC lumbar/R LE x 10 mins with hot pack   Timed Activity Minutes Units   Ther Ex 38 3     Electronically signed by Samantha Camarillo PTA on 3/6/23 at 12:03 PM EST

## 2023-03-08 DIAGNOSIS — E78.00 PURE HYPERCHOLESTEROLEMIA: ICD-10-CM

## 2023-03-08 NOTE — TELEPHONE ENCOUNTER
Requested Prescriptions     Pending Prescriptions Disp Refills    simvastatin (ZOCOR) 10 MG tablet 90 tablet 3     Sig: TAKE ONE TABLET BY MOUTH ONCE DAILY IN THE EVENING

## 2023-03-09 ENCOUNTER — HOSPITAL ENCOUNTER (OUTPATIENT)
Dept: PHYSICAL THERAPY | Age: 73
Setting detail: THERAPIES SERIES
Discharge: HOME | End: 2023-03-09
Payer: MEDICARE

## 2023-03-09 PROCEDURE — 97110 THERAPEUTIC EXERCISES: CPT

## 2023-03-09 PROCEDURE — G0283 ELEC STIM OTHER THAN WOUND: HCPCS

## 2023-03-09 RX ORDER — SIMVASTATIN 10 MG
TABLET ORAL
Qty: 90 TABLET | Refills: 2 | Status: SHIPPED | OUTPATIENT
Start: 2023-03-09

## 2023-03-09 ASSESSMENT — PAIN SCALES - GENERAL: PAINLEVEL_OUTOF10: 0

## 2023-03-09 ASSESSMENT — PAIN DESCRIPTION - PAIN TYPE: TYPE: CHRONIC PAIN

## 2023-03-09 ASSESSMENT — PAIN DESCRIPTION - LOCATION: LOCATION: LEG

## 2023-03-09 ASSESSMENT — PAIN DESCRIPTION - ORIENTATION: ORIENTATION: RIGHT

## 2023-03-09 NOTE — PROGRESS NOTES
[address]  [address]     [address]      ZIWAT:[phone]      PHYSICAL THERAPY PLAN OF CARE     [] Certification  [x] Recertification [x]  Plan of Care  [] Progress Note [] Discharge      Referring Provider: Reba Morris DO    From:  [facility], PT     Patient: Hayden Barba (67 y.o. female) : 1950 Date: 2023   Medical Diagnosis: Muscle strain of right thigh, initial encounter [S76.911A]    Treatment Diagnosis: impaired LE strength, decreased hip flexor ROM, right LE pain    Plan of Care/Certification Expiration Date: 04/10/23   Progress Report Period from: 23 to 3/9/2023    Visits to Date: 8 No Show: 0 Cancelled Appts: 0    OBJECTIVE:   Short Term Goals -=Long term goals    Long Term Goals - Time Frame for Long Term Goals : 4 weeks  Goals Current/ Discharge status Status   Long Term Goal 1: Patient will report 0/10 pain in right thigh with all functional movement. LTG 1 Current Status[de-identified] Pain levels :   2/10 mostly with stairs   In progress   Long Term Goal 2: Patient will increase strength in bilateral LEs >/= 4+/5 to demonstrate functional improvements. Strength RLE  R Hip Flexion: 4-/5  R Hip Extension: 4-/5  R Hip ABduction: 4/5  R Knee Flexion: 5/5  R Knee Extension: 5/5  R Ankle Dorsiflexion: 5/5  Strength LLE  L Hip Flexion: 4+/5  L Hip Extension: 4-/5  L Hip ABduction: 4/5, 4+/5  L Knee Flexion: 5/5  L Knee Extension: 5/5  L Ankle Dorsiflexion: 5/5     In progress, Partially met   Long Term Goal 3: LEFS >/= 56/80 to demonstrate functional improvements. LTG 3 Current Status[de-identified] LEFS 56/80   Met   Long Term Goal 4: Patient will be independent with HEP. LTG 4 Current Status[de-identified] Pt I with HEP   Met     Body Structures, Functions, Activity Limitations Requiring Skilled Therapeutic Intervention: Decreased ROM, Decreased strength, Decreased endurance, Increased pain  Assessment: Pt has Met 2/4 LTG. Pt able to perform a SLR now with some quad lag. Pt  would benifit from cont LE strengthening for overall improvements with function. Therapy Prognosis: Good      PLAN: [x] Evaluate and Treat  Frequency/Duration:  Plan Frequency: 2  Plan weeks: 2-3  Current Treatment Recommendations: Strengthening, ROM, Endurance training, Neuromuscular re-education, Manual, Home exercise program, Patient/Caregiver education & training, Equipment evaluation, education, & procurement, Modalities  Modalities: Heat/Cold, Ultrasound     Precautions:                            Patient Status: []Continue/ Initiate plan of Care     [] Discharge PT. Recommend pt continue with HEP. [x] Additional visits requested, Please re-certify for additional visits:     [] Hold     Objective information provided by: Electronically signed by Tres Aleman PTA on 3/9/23 at 1:22 PM EST        Signature: Electronically signed by Panfilo Watkins PT on 3/9/2023 at 1:36 PM      If you have any questions or concerns, please don't hesitate to call. Thank you for your referral.    I have reviewed this plan of care and certify a need for medically necessary rehabilitation services.     Physician Signature:__________________________________________________________  Date:  Please sign and return

## 2023-03-09 NOTE — PROGRESS NOTES
[address] [facility] [address] [facility] [address]   [place], [address]  DYMGD:[phone]      Physical TherapyTreatment Note        Date: 3/9/2023  Patient: Mariposa Hoffman  : 1950   Confirmed: Yes  MRN: [id number]  Referring Provider: Preston Humphreys DO      Medical Diagnosis: Muscle strain of right thigh, initial encounter [I20.956G]      Treatment Diagnosis: impaired LE strength, decreased hip flexor ROM, right LE pain    Visit Information:  Insurance: Payor: MEDICARE / Plan: MEDICARE PART A AND B / Product Type: *No Product type* /   PT Visit Information  Onset Date:  (3-4 weeks ago)  PT Insurance Information: Medicare; Medical [place]  Total # of Visits Approved:  (BMN)  Total # of Visits to Date: 8  Plan of Care/Certification Expiration Date: 04/10/23  No Show: 0  Progress Note Due Date: 03/10/23  Canceled Appointment: 0  Progress Note Counter:  (PN due 3/10/23)    Subjective Information:  Subjective: Pt reports that she is walking a little more now. HEP Compliance:  [x] Good [] Fair [] Poor [] Reports not doing due to:    Pain Screening  Patient Currently in Pain: Yes  Pain Assessment: 0-10  Pain Level: 0 (with stairs 2/10)  Pain Type: Chronic pain  Pain Location: Leg  Pain Orientation: Right    Treatment:  Exercises:  Exercises  Exercise 1: hip abduction x10  Exercise 3: seated hamstring curls x 10 blue tband  Exercise 5: sidelying hip abuction to flex to neutral-down  x10 pausing 2-3 secs in between movements. Exercise 6: BBD standing hip flex/abd/neurtal down/ hip/knee flexion   L  x 5 each leg VC's 3 sec pause in between movments.  with UE support  Exercise 8: SLR  2 x 5 each  supine 3 secs R   L x 10 3 secs       Manual:           Modalities:  Cryotherapy (CPT 03880)  Patient Position: L sidelying  Number Minutes Cryotherapy: x10 mins with premod tx with cold pack  Cryotherapy location: Right, Low back  Electric stimulation, unattended (CPT 33503) /  (Medicare)  Patient Position: L sidelying  E-stim location: Right, Hip  E-stim specified location: x 10 premod with cold pack       *Indicates exercise, modality, or manual techniques to be initiated when appropriate    Objective Measures:     LTG 1 Current Status[de-identified] Pain levels :   2/10 mostly with stairs  LTG 2 Strength RLE  R Hip Flexion: 4-/5  R Hip Extension: 4-/5  R Hip ABduction: 4/5  R Knee Flexion: 5/5  R Knee Extension: 5/5  R Ankle Dorsiflexion: 5/5  Strength LLE  L Hip Flexion: 4+/5  L Hip Extension: 4-/5  L Hip ABduction: 4/5, 4+/5  L Knee Flexion: 5/5  L Knee Extension: 5/5  L Ankle Dorsiflexion: 5/5  LTG 3 Current Status[de-identified] LEFS 56/80  LTG 4 Current Status[de-identified] Pt I with HEP         Assessment: Body Structures, Functions, Activity Limitations Requiring Skilled Therapeutic Intervention: Decreased ROM, Decreased strength, Decreased endurance, Increased pain  Assessment: Pt has Met 2/4 LTG. Pt able to perform a SLR now with some quad lag. Pt  would benifit from cont LE strengthening. Treatment Diagnosis: impaired LE strength, decreased hip flexor ROM, right LE pain  Therapy Prognosis: Good          Post-Pain Assessment:       Pain Rating (0-10 pain scale):  0 /10   Location and pain description same as pre-treatment unless indicated. Action: [] NA   [x] Perform HEP  [] Meds as prescribed  [] Modalities as prescribed   [] Call Physician     GOALS   Patient Goal(s):    Long Term Goals Completed by 4 weeks Goal Status   LTG 1 Patient will report 0/10 pain in right thigh with all functional movement. In progress   LTG 2 Patient will increase strength in bilateral LEs >/= 4+/5 to demonstrate functional improvements. In progress, Partially met   LTG 3 LEFS >/= 56/80 to demonstrate functional improvements. Met   LTG 4 Patient will be independent with HEP.  Met       Plan:  Frequency/Duration:  Plan  Plan Frequency: 2  Plan weeks: 4  Current Treatment Recommendations: Strengthening, ROM, Endurance training, Neuromuscular re-education, Manual, Home exercise program, Patient/Caregiver education & training, Equipment evaluation, education, & procurement, Modalities  Modalities: Heat/Cold, Ultrasound  Pt to continue current HEP. See objective section for any therapeutic exercise changes, additions or modifications this date.     Therapy Time:      PT Individual Minutes  Time In: 9219  Time Out: 4413  Minutes: 55  Timed Code Treatment Minutes: 45 Minutes  Procedure Minutes:10 min premod with ice   Timed Activity Minutes Units   Ther Ex 45 3     Electronically signed by Albert Blair PTA on 3/9/23 at 12:59 PM EST

## 2023-03-13 ENCOUNTER — HOSPITAL ENCOUNTER (OUTPATIENT)
Dept: PHYSICAL THERAPY | Age: 73
Setting detail: THERAPIES SERIES
Discharge: HOME | End: 2023-03-13
Payer: MEDICARE

## 2023-03-13 PROCEDURE — 97110 THERAPEUTIC EXERCISES: CPT

## 2023-03-13 ASSESSMENT — PAIN SCALES - GENERAL: PAINLEVEL_OUTOF10: 0

## 2023-03-13 NOTE — PROGRESS NOTES
6 [facility] [address] [facility] [address]   [address]  ACTLD:[phone]      Physical TherapyTreatment Note        Date: 3/13/2023  Patient: Rob Paul  : 1950   Confirmed: Yes  MRN: [id number]  Referring Provider: Patito Vidal DO      Medical Diagnosis: Muscle strain of right thigh, initial encounter [O96.045A]      Treatment Diagnosis: impaired LE strength, decreased hip flexor ROM, right LE pain    Visit Information:  Insurance: Payor: MEDICARE / Plan: MEDICARE PART A AND B / Product Type: *No Product type* /   PT Visit Information  Onset Date:  (3-4 weeks ago)  PT Insurance Information: Medicare; Medical [employer]  Total # of Visits Approved:  (BMN)  Total # of Visits to Date: 9  Plan of Care/Certification Expiration Date: 04/10/23  No Show: 0  Progress Note Due Date: 23  Canceled Appointment: 0  Progress Note Counter: -(PN due 23)    Subjective Information:  Subjective: Pt reports that she is able to walk the dogs throughout the day. HEP Compliance:  [x] Good [] Fair [] Poor [] Reports not doing due to:    Pain Screening  Patient Currently in Pain: Denies  Pain Assessment: 0-10  Pain Level: 0    Treatment:  Exercises:  Exercises  Exercise 1: hip abduction x10  Exercise 2: hip abduction x10  Exercise 3: seated hamstring curls x 10 blue tband  Exercise 4: Recip up/down without UE support close SBA with lateral sway  Exercise 7: standing heel raises, BBD flexion, abduction extansion, knee flexion x10  Exercise 8: SLR  2 x 5 each  supine  R   L x 10 3 secs  Exercise 9: lateral step ups x10 SHILPA  Exercise 10: [id number]     Assessment: Body Structures, Functions, Activity Limitations Requiring Skilled Therapeutic Intervention: Decreased ROM, Decreased strength, Decreased endurance, Increased pain  Assessment: Pt with good [place] to exercises. Pt with cont fatigue with after 5 SLR. Will cont hip strengthening as pt north.   Treatment Diagnosis: impaired LE strength, decreased hip flexor ROM, right LE pain  Therapy Prognosis: Good       Post-Pain Assessment:       Pain Rating (0-10 pain scale):   0/10   Location and pain description same as pre-treatment unless indicated. Action: [] NA   [x] Perform HEP  [] Meds as prescribed  [] Modalities as prescribed   [] Call Physician     GOALS   Patient Goal(s):      Long Term Goals Completed by 4 weeks Goal Status   LTG 1 Patient will report 0/10 pain in right thigh with all functional movement. In progress   LTG 2 Patient will increase strength in bilateral LEs >/= 4+/5 to demonstrate functional improvements. In progress   LTG 3 LEFS >/= 56/80 to demonstrate functional improvements. In progress   LTG 4 Patient will be independent with HEP. In progress   LTG 5       LTG 6       LTG 7       LTG 8       LTG 9       LTG 10           Plan:  Frequency/Duration:  Plan  Plan Frequency: 2  Plan weeks: 2-3  Current Treatment Recommendations: Strengthening, ROM, Endurance training, Neuromuscular re-education, Manual, Home exercise program, Patient/Caregiver education & training, Equipment evaluation, education, & procurement, Modalities  Modalities: Heat/Cold, Ultrasound  Pt to continue current HEP. See objective section for any therapeutic exercise changes, additions or modifications this date.     Therapy Time:      PT Individual Minutes  Time In: 1017  Time Out: 1058  Minutes: 41  Timed Code Treatment Minutes: 41 Minutes    Timed Activity Minutes Units   Ther Ex 41 3     Electronically signed by Dylan Torres PTA on 3/13/23 at 12:56 PM EDT

## 2023-03-15 ENCOUNTER — HOSPITAL ENCOUNTER (OUTPATIENT)
Dept: PHYSICAL THERAPY | Age: 73
Setting detail: THERAPIES SERIES
Discharge: HOME | End: 2023-03-15
Payer: MEDICARE

## 2023-03-15 PROCEDURE — 97110 THERAPEUTIC EXERCISES: CPT

## 2023-03-15 PROCEDURE — G0283 ELEC STIM OTHER THAN WOUND: HCPCS

## 2023-03-15 ASSESSMENT — PAIN DESCRIPTION - DESCRIPTORS: DESCRIPTORS: SORE

## 2023-03-15 ASSESSMENT — PAIN SCALES - GENERAL: PAINLEVEL_OUTOF10: 0

## 2023-03-15 ASSESSMENT — PAIN DESCRIPTION - LOCATION: LOCATION: LEG

## 2023-03-15 ASSESSMENT — PAIN DESCRIPTION - PAIN TYPE: TYPE: CHRONIC PAIN

## 2023-03-15 ASSESSMENT — PAIN DESCRIPTION - ORIENTATION: ORIENTATION: RIGHT

## 2023-03-15 NOTE — PROGRESS NOTES
6 [facility] 14 [facility] [address]   [address]  JLQTN:[phone]      Physical TherapyTreatment Note        Date: 3/15/2023  Patient: Vinson Cheadle  : 1950   Confirmed: Yes  MRN: [id number]  Referring Provider: Patricia Velázquez DO      Medical Diagnosis: Muscle strain of right thigh, initial encounter [P53.155Q]      Treatment Diagnosis: impaired LE strength, decreased hip flexor ROM, right LE pain    Visit Information:  Insurance: Payor: MEDICARE / Plan: MEDICARE PART A AND B / Product Type: *No Product type* /   PT Visit Information  Onset Date:  (3-4 weeks ago)  PT Insurance Information: Medicare; Medical [employer]  Total # of Visits Approved:  (BMN)  Total # of Visits to Date: 10  Plan of Care/Certification Expiration Date: 04/10/23  No Show: 0  Progress Note Due Date: 23  Canceled Appointment: 0  Progress Note Counter: -(PN due 23)    Subjective Information:  Subjective: Pt reports walking the dog more and increase pain noted in L knee. HEP Compliance:  [x] Good [] Fair [] Poor [] Reports not doing due to:    Pain Screening  Patient Currently in Pain: Yes  Pain Assessment: 0-10  Pain Level: 0  Pain Type: Chronic pain  Pain Location: Leg  Pain Orientation: Right  Pain Descriptors: Sore    Treatment:  Exercises:  Exercises  Exercise 1: hip abduction x10  Exercise 2: hip abduction x10  Exercise 3: seated hamstring curls x 10 blue tband  Exercise 4: Recip up/down with minimal support  Lateral step up completed R and L . 2 times  Exercise 5: sidelying hip abuction to flex to neutral-down  x10 pausing 2-3 secs in between movements.   Exercise 7: standing heel raises, BBD flexion, abduction extension, knee flexion x10  Exercise 8: SLR x10 each  Exercise 10: bridge  x10  3-5 secs     Modalities:  Moist Heat (CPT 89953)  Patient Position: L sidelying  Number Minutes Moist Heat: 10 mins  Moist heat location: Low back, Right  Moist heat specified location: R hip and lumbar x10 mins with ifc  Electric stimulation, unattended (CPT 44442) /  (Medicare)  Patient Position: L sidelying  E-stim location: Right, Hip  E-stim specified location: x 10 m ins with hot pack     *Indicates exercise, modality, or manual techniques to be initiated when appropriate    Objective Measures:       LTG 1 Current Status[de-identified] 3/15/23 minimal pain 0-1/10      Assessment: Body Structures, Functions, Activity Limitations Requiring Skilled Therapeutic Intervention: Decreased ROM, Decreased strength, Decreased endurance, Increased pain  Assessment: Pt able to tolerate supine LE ROM and strengthening. Pt able to complete 10 SLR on R LE this visit. Pt able to recip stairs ascend, descend required UE support. IFC applied to R hip hot pack to help manage pain levels and R hip pain. Treatment Diagnosis: impaired LE strength, decreased hip flexor ROM, right LE pain  Therapy Prognosis: Good      Post-Pain Assessment:       Pain Rating (0-10 pain scale):   0/10   Location and pain description same as pre-treatment unless indicated. Action: [] NA   [x] Perform HEP  [x] Meds as prescribed  [] Modalities as prescribed   [] Call Physician     GOALS   Patient Goal(s): Patient Goals : \"decrease pain\"    Long Term Goals Completed by 4 weeks Goal Status   LTG 1 Patient will report 0/10 pain in right thigh with all functional movement. In progress   LTG 2 Patient will increase strength in bilateral LEs >/= 4+/5 to demonstrate functional improvements. In progress   LTG 3 LEFS >/= 56/80 to demonstrate functional improvements. In progress   LTG 4 Patient will be independent with HEP.  In progress     Plan:  Frequency/Duration:  Plan  Plan Frequency: 2  Plan weeks: 2-3  Current Treatment Recommendations: Strengthening, ROM, Endurance training, Neuromuscular re-education, Manual, Home exercise program, Patient/Caregiver education & training, Equipment evaluation, education, & procurement, Modalities  Modalities: Heat/Cold, Ultrasound  Additional Comments: Pt has f/u with Dr. Lamont Pena on 3/15/23  will call to schedule more appts or be d/c and complete water therapy. Pt to continue current HEP. See objective section for any therapeutic exercise changes, additions or modifications this date.     Therapy Time:      PT Individual Minutes  Time In: 6527  Time Out: 1200  Minutes: 55  Timed Code Treatment Minutes: 31 Minutes  Procedure Minutes: IFC to R hip x hot pack  Timed Activity Minutes Units   Ther Ex 31 2     Electronically signed by Feliberto Baca PTA on 3/15/23 at 12:21 PM EDT

## 2023-03-16 ENCOUNTER — OFFICE VISIT (OUTPATIENT)
Dept: SPORTS MEDICINE | Age: 73
End: 2023-03-16
Payer: MEDICARE

## 2023-03-16 VITALS
BODY MASS INDEX: 34.04 KG/M2 | WEIGHT: 185 LBS | HEIGHT: 62 IN | DIASTOLIC BLOOD PRESSURE: 64 MMHG | SYSTOLIC BLOOD PRESSURE: 120 MMHG | TEMPERATURE: 97.2 F

## 2023-03-16 DIAGNOSIS — M16.11 PRIMARY OSTEOARTHRITIS OF RIGHT HIP: ICD-10-CM

## 2023-03-16 DIAGNOSIS — M17.12 PRIMARY OSTEOARTHRITIS OF LEFT KNEE: Primary | ICD-10-CM

## 2023-03-16 DIAGNOSIS — S76.911D MUSCLE STRAIN OF RIGHT THIGH, SUBSEQUENT ENCOUNTER: ICD-10-CM

## 2023-03-16 PROCEDURE — 99213 OFFICE O/P EST LOW 20 MIN: CPT | Performed by: FAMILY MEDICINE

## 2023-03-16 PROCEDURE — G8399 PT W/DXA RESULTS DOCUMENT: HCPCS | Performed by: FAMILY MEDICINE

## 2023-03-16 PROCEDURE — 1036F TOBACCO NON-USER: CPT | Performed by: FAMILY MEDICINE

## 2023-03-16 PROCEDURE — 1090F PRES/ABSN URINE INCON ASSESS: CPT | Performed by: FAMILY MEDICINE

## 2023-03-16 PROCEDURE — G8427 DOCREV CUR MEDS BY ELIG CLIN: HCPCS | Performed by: FAMILY MEDICINE

## 2023-03-16 PROCEDURE — G8417 CALC BMI ABV UP PARAM F/U: HCPCS | Performed by: FAMILY MEDICINE

## 2023-03-16 PROCEDURE — 1123F ACP DISCUSS/DSCN MKR DOCD: CPT | Performed by: FAMILY MEDICINE

## 2023-03-16 PROCEDURE — 3017F COLORECTAL CA SCREEN DOC REV: CPT | Performed by: FAMILY MEDICINE

## 2023-03-16 PROCEDURE — G8484 FLU IMMUNIZE NO ADMIN: HCPCS | Performed by: FAMILY MEDICINE

## 2023-03-16 ASSESSMENT — ENCOUNTER SYMPTOMS
NAUSEA: 0
CONSTIPATION: 0
SHORTNESS OF BREATH: 0
BACK PAIN: 0
DIARRHEA: 0

## 2023-03-16 NOTE — PROGRESS NOTES
[facility]) Physicians  Neurosurgery and Pain [facility]  [address] [facility] , [address] [facility], [place] [address]: [phone]  F: [phone]      Tutu De La Cruz  (1950)    3/16/2023    Subjective:     Tutu De La Cruz is 67 y.o. female who complains today of:    Chief Complaint   Patient presents with    Follow-up     Muscle strain of right thigh    Knee Pain     left       HPI     This patient returns stating that she has noticed significant progress on her right leg and she basically feels minimal to no pain at this point and feels her strength is coming back however in the interim she has irritated her left knee that she has had knee problems in the past and she had gel injections in the past and helpful however she thinks the extra weight she is been putting on it because of her right leg issue is caused this problem  Allergies:  Lidocaine, Novocain [procaine], and Shrimp (diagnostic)    Past Medical History:   Diagnosis Date    Acid reflux     Chronic back pain     COVID-19 06/2021    Hyperlipidemia     Hypertension     Osteoarthritis     shots in knees by ortho     Past Surgical History:   Procedure Laterality Date    APPENDECTOMY  1959    BREAST BIOPSY      BREAST SURGERY      HYSTERECTOMY (CERVIX STATUS UNKNOWN)  1992    OVARY REMOVAL      SC COLON CA SCRN NOT  [address] [place] [address] N/A 5/9/2018    COLONOSCOPY performed by Ranjana Guerrero MD at [address] [place] [address]     History reviewed. No pertinent family history.   Social History     Socioeconomic History    Marital status:      Spouse name: Not on file    Number of children: Not on file    Years of education: Not on file    Highest education level: Not on file   Occupational History    Not on file   Tobacco Use    Smoking status: Never    Smokeless tobacco: Never   Substance and Sexual Activity    Alcohol use: Yes     Comment: rarely    Drug use: No    Sexual activity: Not on file   Other Topics Concern    Not on file   Social History Narrative    Not on file     Social Determinants of Health     Financial Resource Strain: Low Risk     Difficulty of Paying Living Expenses: Not hard at all   Food Insecurity: No Food Insecurity    Worried About Running Out of Food in the Last Year: Never true    Ran Out of Food in the Last Year: Never true   Transportation Needs: Not on file   Physical Activity: Sufficiently Active    Days of Exercise per Week: 6 days    Minutes of Exercise per Session: 30 min   Stress: Not on file   Social Connections: Not on file   Intimate Partner Violence: Not on file   Housing Stability: Not on file       Current Outpatient Medications on File Prior to Visit   Medication Sig Dispense Refill    simvastatin (ZOCOR) 10 MG tablet TAKE ONE TABLET BY MOUTH ONCE DAILY IN THE EVENING 90 tablet 2    ketoconazole (NIZORAL) 2 % shampoo Use 3 times weekly for 2 weeks. 1 each 5    ketoconazole (NIZORAL) 2 % cream Apply topically twice daily for at least 4 weeks (or for 1 week after lesions have healed). 1 each 5    mometasone (ELOCON) 0.1 % cream Apply topically daily. 50 g 11    traZODone (DESYREL) 150 MG tablet TAKE 1 TABLET BY MOUTH NIGHTLY AS NEEDED FOR SLEEP 30 tablet 5    Diaper Rash Products ([employer]S DIAPER) 1-10 % CREA APPLY TO AFFECTED AREA TWICE A DAY      amLODIPine (NORVASC) 2.5 MG tablet Take 1 tablet by mouth daily 90 tablet 3    Dimethicone-Zinc Oxide-Vit A-D (CVS ZINC OXIDE DIAPER) 1-10 % CREA Apply 1 applicator topically 2 times daily 113 g 5    Zinc Oxide (ELTA SEAL MOISTURE BARRIER) 6 % CREA Apply 1 applicator topically in the morning and at bedtime 114 g 0    clobetasol (TEMOVATE) 0.05 % cream Apply topically 2 times daily.  60 g 5    Ascorbic Acid (VITAMIN C GUMMIE) 120 MG CHEW Take by mouth      ketorolac (ACULAR) 0.5 % ophthalmic solution USE 1 DROP IN THE OPERATIVE EYE TWICE DAILY      neomycin-polymyxin-dexameth (MAXITROL) 3.5-31964-7.1 ophthalmic suspension INSTILL 1 DROP IN THE OPERATIVE EYE EVERY DAY      traZODone (DESYREL) 50 MG tablet Take half tablet every night as needed. If no improvement after 1 week, increase to 1 whole tablet 30 tablet 1    sodium chloride (OCEAN, BABY AYR) 0.65 % nasal spray 2 sprays by Nasal route 3 times daily 1 Bottle 0    Vitamin D (CHOLECALCIFEROL) 50 MCG (2000 UT) TABS tablet Take 1 tablet by mouth daily 60 tablet 0    ibuprofen (ADVIL;MOTRIN) 800 MG tablet Take 1 tablet by mouth 2 times daily as needed for Pain (Patient not taking: No sig reported) 60 tablet 5    pantoprazole (PROTONIX) 40 MG tablet Take 1 tablet by mouth daily 30 tablet 5     No current facility-administered medications on file prior to visit. Review of Systems   Constitutional:  Negative for fever. HENT:  Negative for hearing loss. Respiratory:  Negative for shortness of breath. Gastrointestinal:  Negative for constipation, diarrhea and nausea. Genitourinary:  Negative for difficulty urinating. Musculoskeletal:  Negative for back pain and neck pain. Skin:  Negative for rash. Neurological:  Negative for headaches. Hematological:  Does not bruise/bleed easily. Psychiatric/Behavioral:  Negative for sleep disturbance. Objective:     Vitals:  /64   Temp 97.2 °F (36.2 °C)   Ht 5' 2\" (1.575 m)   Wt 185 lb (83.9 kg)   BMI 33.84 kg/m² Pain Score:   7 (left knee)      Physical Exam  Vitals reviewed. Constitutional:       Appearance: Normal appearance. Skin:     General: Skin is warm and dry. Neurological:      Mental Status: She is alert.        Ortho Exam   Examination of the right hip hip and thigh reveals good range of motion neurovascular status is intact no palpable tenderness no pain with sartorius engagement patient can do near full squat without pain  Examination of the left knee reveals the neurovascular muscle status to be intact there is no edema or effusion tenderness over the medial joint space mild varus valgus laxity but no instabilities were appreciated negative Lachman negative anterior drawer    I reviewed the x-rays of the left knee done today    Assessment:      Diagnosis Orders   1. Primary osteoarthritis of left knee  VA ARTHROCENTESIS ASPIR&/INJ MAJOR JT/BURSA W/US    XR KNEE LEFT (3 VIEWS)      2. Muscle strain of right thigh, subsequent encounter        3. Primary osteoarthritis of right hip            Plan:   Regarding the patient's right leg issues she seems to be doing well at this point I want her to complete her PT program and continue on home PT  Regarding the left knee we will schedule the patient for a Durolane injection since she has used gels in the past and thus so well with them       No orders of the defined types were placed in this encounter. Orders Placed This Encounter   Procedures    XR KNEE LEFT (3 VIEWS)     Standing Status:   Future     Number of Occurrences:   1     Standing Expiration Date:   3/16/2024    VA ARTHROCENTESIS ASPIR&/INJ MAJOR JT/BURSA W/US     Durolane     Standing Status:   Future     Standing Expiration Date:   4/16/2023         Follow up:  Return for gel injections.     MABLE LEONE DO

## 2023-03-20 ENCOUNTER — HOSPITAL ENCOUNTER (OUTPATIENT)
Dept: PHYSICAL THERAPY | Age: 73
Setting detail: THERAPIES SERIES
Discharge: HOME | End: 2023-03-20
Payer: MEDICARE

## 2023-03-20 PROCEDURE — 97110 THERAPEUTIC EXERCISES: CPT

## 2023-03-20 PROCEDURE — G0283 ELEC STIM OTHER THAN WOUND: HCPCS

## 2023-03-20 ASSESSMENT — PAIN DESCRIPTION - ORIENTATION: ORIENTATION: RIGHT

## 2023-03-20 ASSESSMENT — PAIN DESCRIPTION - PAIN TYPE: TYPE: CHRONIC PAIN

## 2023-03-20 ASSESSMENT — PAIN SCALES - GENERAL: PAINLEVEL_OUTOF10: 1

## 2023-03-20 ASSESSMENT — PAIN DESCRIPTION - LOCATION: LOCATION: LEG

## 2023-03-20 ASSESSMENT — PAIN DESCRIPTION - DESCRIPTORS: DESCRIPTORS: SORE

## 2023-03-20 NOTE — PROGRESS NOTES
[facility], [address] [facility] [address]   [facility]  POYYT:[phone]      Physical TherapyTreatment Note        Date: 3/20/2023  Patient: Iram Ramirez  : 1950   Confirmed: Yes  MRN: [id number]  Referring Provider: Hallie Anguiano DO      Medical Diagnosis: Muscle strain of right thigh, initial encounter [Z14.612H]      Treatment Diagnosis: impaired LE strength, decreased hip flexor ROM, right LE pain    Visit Information:  Insurance: Payor: MEDICARE / Plan: MEDICARE PART A AND B / Product Type: *No Product type* /   PT Visit Information  Onset Date:  (3-4 weeks ago)  PT Insurance Information: Medicare; Medical [place]  Total # of Visits Approved:  (BMN)  Total # of Visits to Date: 6  Plan of Care/Certification Expiration Date: 04/10/23  No Show: 0  Progress Note Due Date: 23  Canceled Appointment: 0  Progress Note Counter: 3/4-(PN due 23)    Subjective Information:  Subjective: Pt reports that her pain levels are not bad today. Has not done a lot of walking yet today. Scheduled for an injection R knee on 3/20/23. HEP Compliance:  [x] Good [] Fair [] Poor [] Reports not doing due to:    Pain Screening  Patient Currently in Pain: Yes  Pain Assessment: 0-10  Pain Level: 1  Pain Type: Chronic pain  Pain Location: Leg  Pain Orientation: Right  Pain Descriptors: Sore    Treatment:  Exercises:  Exercises  Exercise 2: hip adduction x 15  Exercise 3: seated hamstring curls x 10 blue tband  Exercise 4: Recip up/down with minimal support  Lateral step up completed R and L  x 2 sets of 13 stairs  Exercise 5: sidelying hip abduction to flex to neutral-down  x10 pausing 2-3 secs in between movements.   Exercise 8: SLR x10 each  Exercise 10: bridge  x10  3-5 secs     Modalities:  Moist Heat (CPT 62510)  Patient Position: L sidelying  Number Minutes Moist Heat: 10 mins  Moist heat location: Low back, Right  Moist heat specified location: R hip and lumbar x10 mins with [facility]  Electric

## 2023-03-24 ENCOUNTER — PROCEDURE VISIT (OUTPATIENT)
Dept: SPORTS MEDICINE | Age: 73
End: 2023-03-24

## 2023-03-24 DIAGNOSIS — M17.12 PRIMARY OSTEOARTHRITIS OF LEFT KNEE: Primary | ICD-10-CM

## 2023-03-24 NOTE — PROGRESS NOTES
@[facility]@   Spine Surgery  Advanced Pain Management           Provider: Cruz Gomez DO          Patient Name: Waqar Doherty : 1950         Date: 3/24/23          PROCEDURE:  US Knee Injection  Dx DJD left knee    After informed consent the patient was put in a supine position the left knee was exposed and draped. Using msk US the knee joint was identified and prepped with Betadine. A 22g US guided needle was used to inject 3 mL Durolane with relief of symptoms.   Pt tolerated procedure well, left stable, told to ice the knee today and resume normal activity in 2 days  US images of procedure were saved

## 2023-03-27 ENCOUNTER — HOSPITAL ENCOUNTER (OUTPATIENT)
Dept: PHYSICAL THERAPY | Age: 73
Setting detail: THERAPIES SERIES
Discharge: HOME | End: 2023-03-27
Payer: MEDICARE

## 2023-03-27 PROCEDURE — G0283 ELEC STIM OTHER THAN WOUND: HCPCS

## 2023-03-27 PROCEDURE — 97110 THERAPEUTIC EXERCISES: CPT

## 2023-03-27 PROCEDURE — 97140 MANUAL THERAPY 1/> REGIONS: CPT

## 2023-03-27 ASSESSMENT — PAIN DESCRIPTION - ORIENTATION: ORIENTATION: RIGHT

## 2023-03-27 ASSESSMENT — PAIN DESCRIPTION - DESCRIPTORS: DESCRIPTORS: SORE

## 2023-03-27 ASSESSMENT — PAIN DESCRIPTION - PAIN TYPE: TYPE: CHRONIC PAIN

## 2023-03-27 ASSESSMENT — PAIN SCALES - GENERAL: PAINLEVEL_OUTOF10: 1

## 2023-03-27 ASSESSMENT — PAIN DESCRIPTION - LOCATION: LOCATION: LEG

## 2023-03-27 NOTE — PROGRESS NOTES
[address]  [address]     [address]      BFEGV:[phone]      PHYSICAL THERAPY PLAN OF CARE     [] Certification  [] Recertification []  Plan of Care  [] Progress Note [x] Discharge      Referring Provider: Teodoro Knox DO    From: Usama Maki PT     Patient: Lisette Mckenzie (67 y.o. female) : 1950 Date: 2023   Medical Diagnosis: Muscle strain of right thigh, initial encounter [S76.911A]    Treatment Diagnosis: impaired LE strength, decreased hip flexor ROM, right LE pain    Plan of Care/Certification Expiration Date: 04/10/23   Progress Report Period from: 23  to 3/27/2023    Visits to Date: 12 No Show: 0 Cancelled Appts: 0    OBJECTIVE:   Long Term Goals - Time Frame for Long Term Goals : 4 weeks  Goals Current/ Discharge status Status   Long Term Goal 1: Patient will report 0/10 pain in right thigh with all functional movement. LTG 1 Current Status[de-identified] 3/27/23 minimal pain 0-1/10   Met   Long Term Goal 2: Patient will increase strength in bilateral LEs >/= 4+/5 to demonstrate functional improvements. LTG 2 Current Status[de-identified] 3/27/23:  R SLR 3/5 strength  remaining movements R LE 4/5   Not Met   Long Term Goal 3: LEFS >/= 56/80 to demonstrate functional improvements. LTG 3 Current Status[de-identified] LEFS 73/80   3/27/23 for functional  improvements. Met   Long Term Goal 4: Patient will be independent with HEP. LTG 4 Current Status[de-identified] Pt independent with HEP 3/27/23   Met     Body Structures, Functions, Activity Limitations Requiring Skilled Therapeutic Intervention: Decreased ROM, Decreased strength, Decreased endurance, Increased pain  Assessment: Objective measures taken ROM /MMT LE and Lumbar spine. Pt able to complete R SLR unable to hold pressure. Pt continues with HEP and will progress with pool therapy.   Therapy Prognosis: Good     PLAN: [x] Discharged from PT                      Patient Status:[] Continue/ Initiate plan of Care     [x] Discharge PT.
Individual Minutes  Time In: 1530  Time Out: 1610  Minutes: 40  Timed Code Treatment Minutes: 25 Minutes  Procedure Minutes:IFC to R hip with hot pack x 10 mins   Timed Activity Minutes Units   Ther Ex 10 1   Manual  15 1     Electronically signed by Ovi Johnson PTA on 3/27/23 at 3:39 PM EDT

## 2023-03-30 ENCOUNTER — TELEPHONE (OUTPATIENT)
Dept: SPORTS MEDICINE | Age: 73
End: 2023-03-30

## 2023-03-30 NOTE — TELEPHONE ENCOUNTER
Patient left voice mail stating she had a knee injection on Friday and done a lot of walking with her dog, she did ice it and was wondering what she should do, she is asking for a call [phone]

## 2023-03-30 NOTE — TELEPHONE ENCOUNTER
Patient is aware of Dr Tiffanie Carney message. She does have a follow up on 4/10, patient did move appointment sooner.

## 2023-04-05 ENCOUNTER — TELEPHONE (OUTPATIENT)
Dept: GASTROENTEROLOGY | Age: 73
End: 2023-04-05

## 2023-04-05 DIAGNOSIS — Z12.11 COLON CANCER SCREENING: Primary | ICD-10-CM

## 2023-04-05 PROBLEM — E66.01 SEVERE OBESITY (BMI 35.0-39.9) WITH COMORBIDITY (HCC): Status: ACTIVE | Noted: 2023-04-05

## 2023-04-05 NOTE — TELEPHONE ENCOUNTER
Spoke to patient, agreeable to colonoscopy. Patient scheduled 8/10/2023 at 11:30 am. Miralax Prep mailed to patient. Referral pended to PCP. Info faxed to Jaime Freeman.

## 2023-04-06 ENCOUNTER — OFFICE VISIT (OUTPATIENT)
Dept: SPORTS MEDICINE | Age: 73
End: 2023-04-06
Payer: MEDICARE

## 2023-04-06 VITALS
WEIGHT: 197 LBS | HEIGHT: 62 IN | DIASTOLIC BLOOD PRESSURE: 62 MMHG | TEMPERATURE: 97.7 F | BODY MASS INDEX: 36.25 KG/M2 | SYSTOLIC BLOOD PRESSURE: 126 MMHG

## 2023-04-06 DIAGNOSIS — Z12.11 COLON CANCER SCREENING: Primary | ICD-10-CM

## 2023-04-06 DIAGNOSIS — M17.12 PRIMARY OSTEOARTHRITIS OF LEFT KNEE: Primary | ICD-10-CM

## 2023-04-06 PROCEDURE — 1090F PRES/ABSN URINE INCON ASSESS: CPT | Performed by: FAMILY MEDICINE

## 2023-04-06 PROCEDURE — 99213 OFFICE O/P EST LOW 20 MIN: CPT | Performed by: FAMILY MEDICINE

## 2023-04-06 PROCEDURE — G8399 PT W/DXA RESULTS DOCUMENT: HCPCS | Performed by: FAMILY MEDICINE

## 2023-04-06 PROCEDURE — 1123F ACP DISCUSS/DSCN MKR DOCD: CPT | Performed by: FAMILY MEDICINE

## 2023-04-06 PROCEDURE — G8427 DOCREV CUR MEDS BY ELIG CLIN: HCPCS | Performed by: FAMILY MEDICINE

## 2023-04-06 PROCEDURE — G8417 CALC BMI ABV UP PARAM F/U: HCPCS | Performed by: FAMILY MEDICINE

## 2023-04-06 PROCEDURE — 3017F COLORECTAL CA SCREEN DOC REV: CPT | Performed by: FAMILY MEDICINE

## 2023-04-06 PROCEDURE — 1036F TOBACCO NON-USER: CPT | Performed by: FAMILY MEDICINE

## 2023-04-06 ASSESSMENT — ENCOUNTER SYMPTOMS
SHORTNESS OF BREATH: 0
DIARRHEA: 0
NAUSEA: 0
CONSTIPATION: 0
BACK PAIN: 0

## 2023-04-06 NOTE — PROGRESS NOTES
Needs: Unknown    Lack of Transportation (Medical): Not on file    Lack of Transportation (Non-Medical): No   Physical Activity: Sufficiently Active    Days of Exercise per Week: 6 days    Minutes of Exercise per Session: 30 min   Stress: Not on file   Social Connections: Not on file   Intimate Partner Violence: Not on file   Housing Stability: Unknown    Unable to Pay for Housing in the Last Year: Not on file    Number of Places Lived in the Last Year: Not on file    Unstable Housing in the Last Year: No       Current Outpatient Medications on File Prior to Visit   Medication Sig Dispense Refill    simvastatin (ZOCOR) 10 MG tablet TAKE ONE TABLET BY MOUTH ONCE DAILY IN THE EVENING 90 tablet 2    ketoconazole (NIZORAL) 2 % shampoo Use 3 times weekly for 2 weeks. 1 each 5    ketoconazole (NIZORAL) 2 % cream Apply topically twice daily for at least 4 weeks (or for 1 week after lesions have healed). 1 each 5    mometasone (ELOCON) 0.1 % cream Apply topically daily. 50 g 11    traZODone (DESYREL) 150 MG tablet TAKE 1 TABLET BY MOUTH NIGHTLY AS NEEDED FOR SLEEP 30 tablet 5    Diaper Rash Products ([employer]S DIAPER) 1-10 % CREA APPLY TO AFFECTED AREA TWICE A DAY      amLODIPine (NORVASC) 2.5 MG tablet Take 1 tablet by mouth daily 90 tablet 3    clobetasol (TEMOVATE) 0.05 % cream Apply topically 2 times daily. 60 g 5    Ascorbic Acid (VITAMIN C GUMMIE) 120 MG CHEW Take by mouth      ketorolac (ACULAR) 0.5 % ophthalmic solution USE 1 DROP IN THE OPERATIVE EYE TWICE DAILY      neomycin-polymyxin-dexameth (MAXITROL) 3.5-18187-2.1 ophthalmic suspension INSTILL 1 DROP IN THE OPERATIVE EYE EVERY DAY      traZODone (DESYREL) 50 MG tablet Take half tablet every night as needed.  If no improvement after 1 week, increase to 1 whole tablet 30 tablet 1    Vitamin D (CHOLECALCIFEROL) 50 MCG (2000 UT) TABS tablet Take 1 tablet by mouth daily 60 tablet 0    Dimethicone-Zinc Oxide-Vit A-D (CVS ZINC OXIDE DIAPER) 1-10 % CREA Apply 1 applicator

## 2023-06-22 ENCOUNTER — OFFICE VISIT (OUTPATIENT)
Dept: SPORTS MEDICINE | Age: 73
End: 2023-06-22
Payer: MEDICARE

## 2023-06-22 VITALS
HEIGHT: 62 IN | WEIGHT: 190 LBS | DIASTOLIC BLOOD PRESSURE: 64 MMHG | BODY MASS INDEX: 34.96 KG/M2 | SYSTOLIC BLOOD PRESSURE: 126 MMHG | TEMPERATURE: 97.1 F

## 2023-06-22 DIAGNOSIS — M17.12 PRIMARY OSTEOARTHRITIS OF LEFT KNEE: Primary | ICD-10-CM

## 2023-06-22 PROCEDURE — 1036F TOBACCO NON-USER: CPT | Performed by: FAMILY MEDICINE

## 2023-06-22 PROCEDURE — 99213 OFFICE O/P EST LOW 20 MIN: CPT | Performed by: FAMILY MEDICINE

## 2023-06-22 PROCEDURE — 1123F ACP DISCUSS/DSCN MKR DOCD: CPT | Performed by: FAMILY MEDICINE

## 2023-06-22 PROCEDURE — 3017F COLORECTAL CA SCREEN DOC REV: CPT | Performed by: FAMILY MEDICINE

## 2023-06-22 PROCEDURE — 1090F PRES/ABSN URINE INCON ASSESS: CPT | Performed by: FAMILY MEDICINE

## 2023-06-22 PROCEDURE — G8399 PT W/DXA RESULTS DOCUMENT: HCPCS | Performed by: FAMILY MEDICINE

## 2023-06-22 PROCEDURE — G8427 DOCREV CUR MEDS BY ELIG CLIN: HCPCS | Performed by: FAMILY MEDICINE

## 2023-06-22 PROCEDURE — G8417 CALC BMI ABV UP PARAM F/U: HCPCS | Performed by: FAMILY MEDICINE

## 2023-06-22 ASSESSMENT — ENCOUNTER SYMPTOMS
NAUSEA: 0
CONSTIPATION: 0
DIARRHEA: 0
SHORTNESS OF BREATH: 0
BACK PAIN: 0

## 2023-06-22 NOTE — PROGRESS NOTES
[facility]) Physicians  Neurosurgery and Pain [facility]  [address] [facility] [address] [facility] [facility] , [address] [facility], [place] [address]: [phone]  F: [phone]      Krystal Orozco  (1950)    6/22/2023    Subjective:     Krystal Orozco is 67 y.o. female who complains today of:    Chief Complaint   Patient presents with    Knee Pain     left       HPI     This patient returns stating that she is feeling a lot better since her injection she went through a lot of walking on her trip and did well  Allergies:  Lidocaine, Novocain [procaine], and Shrimp (diagnostic)    Past Medical History:   Diagnosis Date    Acid reflux     Chronic back pain     COVID-19 06/2021    Hyperlipidemia     Hypertension     Osteoarthritis     shots in knees by ortho     Past Surgical History:   Procedure Laterality Date    APPENDECTOMY  1959    BREAST BIOPSY      BREAST SURGERY      HYSTERECTOMY (CERVIX STATUS UNKNOWN)  [place] [address] [place] SCRN NOT  [address] IND N/A 5/9/2018    COLONOSCOPY performed by Jennifer Miranda MD at [address] [place] [address]     History reviewed. No pertinent family history.   Social History     Socioeconomic History    Marital status:      Spouse name: Not on file    Number of children: Not on file    Years of education: Not on file    Highest education level: Not on file   Occupational History    Not on file   Tobacco Use    Smoking status: Never    Smokeless tobacco: Never   Substance and Sexual Activity    Alcohol use: Yes     Comment: rarely    Drug use: No    Sexual activity: Not on file   Other Topics Concern    Not on file   Social History Narrative    Not on file     Social Determinants of Health     Financial Resource Strain: Low Risk     Difficulty of Paying Living Expenses: Not hard at all   Food Insecurity: No Food Insecurity    Worried About Running Out of Food in the Last Year: Never true    [address] in the Last Year: Never true   Transportation Needs:

## 2023-07-07 ENCOUNTER — TELEPHONE (OUTPATIENT)
Dept: PRIMARY CARE CLINIC | Age: 73
End: 2023-07-07

## 2023-07-07 DIAGNOSIS — L21.9 SEBORRHEIC DERMATITIS: Primary | ICD-10-CM

## 2023-08-08 ENCOUNTER — PREP FOR PROCEDURE (OUTPATIENT)
Dept: GASTROENTEROLOGY | Age: 73
End: 2023-08-08

## 2023-08-08 RX ORDER — SODIUM CHLORIDE 0.9 % (FLUSH) 0.9 %
5-40 SYRINGE (ML) INJECTION EVERY 12 HOURS SCHEDULED
Status: CANCELLED | OUTPATIENT
Start: 2023-08-08

## 2023-08-08 RX ORDER — SODIUM CHLORIDE 9 MG/ML
INJECTION, SOLUTION INTRAVENOUS CONTINUOUS
Status: CANCELLED | OUTPATIENT
Start: 2023-08-08

## 2023-08-08 RX ORDER — SODIUM CHLORIDE 9 MG/ML
INJECTION, SOLUTION INTRAVENOUS PRN
Status: CANCELLED | OUTPATIENT
Start: 2023-08-08

## 2023-08-10 ENCOUNTER — ANESTHESIA (OUTPATIENT)
Dept: ENDOSCOPY | Age: 73
End: 2023-08-10
Payer: MEDICARE

## 2023-08-10 ENCOUNTER — ANESTHESIA EVENT (OUTPATIENT)
Dept: ENDOSCOPY | Age: 73
End: 2023-08-10
Payer: MEDICARE

## 2023-08-10 ENCOUNTER — HOSPITAL ENCOUNTER (OUTPATIENT)
Age: 73
Setting detail: OUTPATIENT SURGERY
Discharge: HOME | End: 2023-08-10
Attending: SPECIALIST | Admitting: SPECIALIST
Payer: MEDICARE

## 2023-08-10 VITALS
DIASTOLIC BLOOD PRESSURE: 68 MMHG | HEIGHT: 62 IN | BODY MASS INDEX: 34.96 KG/M2 | OXYGEN SATURATION: 95 % | SYSTOLIC BLOOD PRESSURE: 147 MMHG | RESPIRATION RATE: 16 BRPM | WEIGHT: 190 LBS | TEMPERATURE: 98.5 F | HEART RATE: 67 BPM

## 2023-08-10 PROBLEM — Z86.010 H/O ADENOMATOUS POLYP OF COLON: Status: ACTIVE | Noted: 2023-08-10

## 2023-08-10 PROBLEM — Z86.0101 H/O ADENOMATOUS POLYP OF COLON: Status: ACTIVE | Noted: 2023-08-10

## 2023-08-10 PROCEDURE — 6360000002 HC RX W HCPCS: Performed by: NURSE ANESTHETIST, CERTIFIED REGISTERED

## 2023-08-10 PROCEDURE — 2580000003 HC RX 258: Performed by: SPECIALIST

## 2023-08-10 PROCEDURE — 2709999900 HC NON-CHARGEABLE SUPPLY: Performed by: SPECIALIST

## 2023-08-10 PROCEDURE — 3700000001 HC ADD 15 MINUTES (ANESTHESIA): Performed by: SPECIALIST

## 2023-08-10 PROCEDURE — 7100000010 HC PHASE II RECOVERY - FIRST 15 MIN: Performed by: SPECIALIST

## 2023-08-10 PROCEDURE — 3700000000 HC ANESTHESIA ATTENDED CARE: Performed by: SPECIALIST

## 2023-08-10 PROCEDURE — 3609027000 HC COLONOSCOPY: Performed by: SPECIALIST

## 2023-08-10 PROCEDURE — 6370000000 HC RX 637 (ALT 250 FOR IP): Performed by: SPECIALIST

## 2023-08-10 PROCEDURE — 7100000011 HC PHASE II RECOVERY - ADDTL 15 MIN: Performed by: SPECIALIST

## 2023-08-10 PROCEDURE — 2580000003 HC RX 258

## 2023-08-10 PROCEDURE — G0105 COLORECTAL SCRN; HI RISK IND: HCPCS | Performed by: SPECIALIST

## 2023-08-10 RX ORDER — MAGNESIUM HYDROXIDE 1200 MG/15ML
LIQUID ORAL PRN
Status: DISCONTINUED | OUTPATIENT
Start: 2023-08-10 | End: 2023-08-10 | Stop reason: ALTCHOICE

## 2023-08-10 RX ORDER — ONDANSETRON 2 MG/ML
4 INJECTION INTRAMUSCULAR; INTRAVENOUS
Status: DISCONTINUED | OUTPATIENT
Start: 2023-08-10 | End: 2023-08-10 | Stop reason: HOSPADM

## 2023-08-10 RX ORDER — SODIUM CHLORIDE 9 MG/ML
INJECTION, SOLUTION INTRAVENOUS CONTINUOUS
Status: DISCONTINUED | OUTPATIENT
Start: 2023-08-10 | End: 2023-08-10 | Stop reason: HOSPADM

## 2023-08-10 RX ORDER — SODIUM CHLORIDE 0.9 % (FLUSH) 0.9 %
5-40 SYRINGE (ML) INJECTION EVERY 12 HOURS SCHEDULED
Status: DISCONTINUED | OUTPATIENT
Start: 2023-08-10 | End: 2023-08-10 | Stop reason: HOSPADM

## 2023-08-10 RX ORDER — SODIUM CHLORIDE 0.9 % (FLUSH) 0.9 %
5-40 SYRINGE (ML) INJECTION PRN
Status: DISCONTINUED | OUTPATIENT
Start: 2023-08-10 | End: 2023-08-10 | Stop reason: HOSPADM

## 2023-08-10 RX ORDER — SODIUM CHLORIDE 9 MG/ML
INJECTION, SOLUTION INTRAVENOUS PRN
Status: DISCONTINUED | OUTPATIENT
Start: 2023-08-10 | End: 2023-08-10 | Stop reason: HOSPADM

## 2023-08-10 RX ORDER — SIMETHICONE 20 MG/.3ML
EMULSION ORAL PRN
Status: DISCONTINUED | OUTPATIENT
Start: 2023-08-10 | End: 2023-08-10 | Stop reason: ALTCHOICE

## 2023-08-10 RX ORDER — SODIUM CHLORIDE 9 MG/ML
INJECTION, SOLUTION INTRAVENOUS
Status: COMPLETED
Start: 2023-08-10 | End: 2023-08-10

## 2023-08-10 RX ORDER — PROPOFOL 10 MG/ML
INJECTION, EMULSION INTRAVENOUS PRN
Status: DISCONTINUED | OUTPATIENT
Start: 2023-08-10 | End: 2023-08-10 | Stop reason: SDUPTHER

## 2023-08-10 RX ADMIN — PROPOFOL 200 MG: 10 INJECTION, EMULSION INTRAVENOUS at 11:22

## 2023-08-10 RX ADMIN — SODIUM CHLORIDE: 9 INJECTION, SOLUTION INTRAVENOUS at 10:57

## 2023-08-10 ASSESSMENT — PAIN - FUNCTIONAL ASSESSMENT: PAIN_FUNCTIONAL_ASSESSMENT: 0-10

## 2023-08-10 NOTE — DISCHARGE INSTRUCTIONS
Lower Discharge Instructions    Patient Name: Nesha Schwab  Patient ID: [id number]  YOB: 1950  Procedure: Amaury Villegas  Referring Physician: [unfilled]  Procedure Date: 8/10/2023    Recommendations:  []   Follow-up appointment with family physician in 4 weeks. []   Colonoscopy recommended in 5  years. []   Follow-up appointment with endoscopist in  Reports of your procedure and these recommendations have been sent to:  [unfilled]    Sedative medication given for procedures can slow your reaction time and coordination for many hours. If you receive medications, it is important for your safety to follow the instructions below for the remainder of the day:  BE TAKEN directly home from the center and rest quietly. DO NOT resume normal activities until tomorrow. Do NOT drive, return to work, or operate any machinery or power tools. Do NOT make any important personal or business decisions, sign any legal papers or perform any activity that depends on your full concentration power or mental judgement. Do NOT drink any alcohol or take nerve or sleeping drugs. They add to the effects of the medicine still present in your body.    [] (Checked if a biopsy or polyp removal was performed)  There is a slight risk of bleeding. If you had a biopsy or a polyp removed, we suggest that you follow the instructions below:  Do NOT take aspirin or similar anti-inflammatory medicine for ___ days. Do NOT exercise, jog, or do any heavy lifting or straining for 1 day. Potential common after effects and treatment following the procedure:  Mild abdominal pain, bloating, or excessive gas - rest, eat lightly, and use a heating pad. Redness and/or swelling where the IV was - apply heat and elevate. Symptoms to report to your physician:  Severe abdominal pain or bloating. Chills or fever above 101 degrees occurring within 24 hours after procedure. Large amounts of rectal bleeding that does not stop.  Small amount of rectal

## 2023-08-10 NOTE — ANESTHESIA PRE PROCEDURE
Department of Anesthesiology  Preprocedure Note       Name:  Edagrdo Moody   Age:  67 y.o.  :  1950                                          MRN:  [id number]         Date:  8/10/2023      Surgeon: Mony Ibrahim):  Zee Gutierrez MD    Procedure: Procedure(s):  COLORECTAL CANCER SCREENING, NOT HIGH RISK    Medications prior to admission:   Prior to Admission medications    Medication Sig Start Date End Date Taking? Authorizing Provider   simvastatin (ZOCOR) 10 MG tablet TAKE ONE TABLET BY MOUTH ONCE DAILY IN THE EVENING 3/9/23   Daja Winters MD   ketoconazole (NIZORAL) 2 % shampoo Use 3 times weekly for 2 weeks. 23   Daja Winters MD   ketoconazole (NIZORAL) 2 % cream Apply topically twice daily for at least 4 weeks (or for 1 week after lesions have healed). 23   Daja Winters MD   mometasone (ELOCON) 0.1 % cream Apply topically daily. 23   Daja Winters MD   traZODone (DESYREL) 150 MG tablet TAKE 1 TABLET BY MOUTH NIGHTLY AS NEEDED FOR SLEEP 10/13/22   Daja Winters MD   Diaper Rash Products ([employer]S DIAPER) 1-10 % CREA APPLY TO AFFECTED AREA TWICE A DAY 22   Historical Provider, MD   amLODIPine (NORVASC) 2.5 MG tablet Take 1 tablet by mouth daily 22   Daja Winters MD   Zinc Oxide (ELTA SEAL MOISTURE BARRIER) 6 % CREA Apply 1 applicator topically in the morning and at bedtime 22   Daja Winters MD   pantoprazole (PROTONIX) 40 MG tablet Take 1 tablet by mouth daily 2/10/22 2/10/23  Daja Winters MD   clobetasol (TEMOVATE) 0.05 % cream Apply topically 2 times daily.  21   Daja Winters MD   Ascorbic Acid (VITAMIN C GUMMIE) 120 MG CHEW Take by mouth    Historical Provider, MD   ketorolac (ACULAR) 0.5 % ophthalmic solution USE 1 DROP IN THE OPERATIVE EYE TWICE DAILY  Patient not taking: Reported on 8/10/2023 8/25/21   Historical Provider, MD   neomycin-polymyxin-dexameth (MAXITROL) 3.5-29576-1.1 ophthalmic suspension INSTILL 1 DROP IN THE OPERATIVE EYE EVERY DAY  Patient not

## 2023-08-10 NOTE — ANESTHESIA POSTPROCEDURE EVALUATION
Department of Anesthesiology  Postprocedure Note    Patient: Oralia Olivas  MRN: [id number]  YOB: 1950  Date of evaluation: 8/10/2023      Procedure Summary     Date: 08/10/23 Room / Location: [address] [place] [address] 01 / [facility]    Anesthesia Start: 1119 Anesthesia Stop: 1132    Procedure: COLORECTAL CANCER SCREENING, NOT HIGH RISK Diagnosis:       Special screening for malignant neoplasms, colon      (Special screening for malignant neoplasms, colon [Z12.11])    Surgeons: Gilberto Thacker MD Responsible Provider: ILIA Johnston CRNA    Anesthesia Type: MAC ASA Status: 2          Anesthesia Type: No value filed.     Eboni Phase I: Eboni Score: 10    Eboni Phase II:        Anesthesia Post Evaluation    Patient location during evaluation: bedside  Patient participation: complete - patient participated  Level of consciousness: awake and awake and alert  Airway patency: patent  Nausea & Vomiting: no nausea and no vomiting  Complications: no  Cardiovascular status: blood pressure returned to baseline and hemodynamically stable  Respiratory status: acceptable  Hydration status: euvolemic  Pain management: adequate

## 2023-09-16 DIAGNOSIS — I10 ESSENTIAL HYPERTENSION: ICD-10-CM

## 2023-09-18 RX ORDER — AMLODIPINE BESYLATE 2.5 MG/1
2.5 TABLET ORAL DAILY
Qty: 90 TABLET | Refills: 2 | Status: SHIPPED | OUTPATIENT
Start: 2023-09-18

## 2023-09-25 ENCOUNTER — OFFICE VISIT (OUTPATIENT)
Dept: SPORTS MEDICINE | Age: 73
End: 2023-09-25
Payer: MEDICARE

## 2023-09-25 VITALS — TEMPERATURE: 96.9 F | BODY MASS INDEX: 34.96 KG/M2 | WEIGHT: 190 LBS | HEIGHT: 62 IN

## 2023-09-25 DIAGNOSIS — M17.12 PRIMARY OSTEOARTHRITIS OF LEFT KNEE: Primary | ICD-10-CM

## 2023-09-25 PROCEDURE — 20611 DRAIN/INJ JOINT/BURSA W/US: CPT | Performed by: FAMILY MEDICINE

## 2023-09-25 PROCEDURE — 99999 PR OFFICE/OUTPT VISIT,PROCEDURE ONLY: CPT | Performed by: FAMILY MEDICINE

## 2023-09-25 NOTE — PROGRESS NOTES
@[facility]@   Spine Surgery  Advanced Pain Management           Provider: Rajinder Dial DO          Patient Name: Jackelin Inman : 1950         Date: 23          PROCEDURE:  US Knee Injection  Dx DJD left knee    After informed consent the patient was put in a supine position the left knee was exposed and draped. Using msk US the knee joint was identified and prepped with Betadine. A 22g US guided needle was used to inject 3 mL Durolane with relief of symptoms.   Pt tolerated procedure well, left stable, told to ice the knee today and resume normal activity in 2 days  US images of procedure were saved

## 2023-10-04 ENCOUNTER — OFFICE VISIT (OUTPATIENT)
Dept: PRIMARY CARE CLINIC | Age: 73
End: 2023-10-04
Payer: MEDICARE

## 2023-10-04 VITALS
SYSTOLIC BLOOD PRESSURE: 122 MMHG | TEMPERATURE: 97.9 F | WEIGHT: 194 LBS | OXYGEN SATURATION: 93 % | DIASTOLIC BLOOD PRESSURE: 72 MMHG | BODY MASS INDEX: 35.7 KG/M2 | HEART RATE: 72 BPM | HEIGHT: 62 IN

## 2023-10-04 DIAGNOSIS — I10 PRIMARY HYPERTENSION: ICD-10-CM

## 2023-10-04 DIAGNOSIS — E78.49 OTHER HYPERLIPIDEMIA: ICD-10-CM

## 2023-10-04 DIAGNOSIS — Z00.00 MEDICARE ANNUAL WELLNESS VISIT, SUBSEQUENT: Primary | ICD-10-CM

## 2023-10-04 DIAGNOSIS — R73.9 HYPERGLYCEMIA: ICD-10-CM

## 2023-10-04 DIAGNOSIS — F33.1 MAJOR DEPRESSIVE DISORDER, RECURRENT, MODERATE (HCC): ICD-10-CM

## 2023-10-04 DIAGNOSIS — Z00.00 PREVENTATIVE HEALTH CARE: ICD-10-CM

## 2023-10-04 DIAGNOSIS — F33.0 MAJOR DEPRESSIVE DISORDER, RECURRENT, MILD (HCC): ICD-10-CM

## 2023-10-04 DIAGNOSIS — E03.9 HYPOTHYROIDISM, UNSPECIFIED TYPE: ICD-10-CM

## 2023-10-04 DIAGNOSIS — F33.0 MILD EPISODE OF RECURRENT MAJOR DEPRESSIVE DISORDER (HCC): ICD-10-CM

## 2023-10-04 DIAGNOSIS — Z23 NEED FOR INFLUENZA VACCINATION: ICD-10-CM

## 2023-10-04 PROBLEM — F33.9 MAJOR DEPRESSIVE DISORDER, RECURRENT, UNSPECIFIED (HCC): Status: ACTIVE | Noted: 2023-10-04

## 2023-10-04 PROCEDURE — 3017F COLORECTAL CA SCREEN DOC REV: CPT | Performed by: INTERNAL MEDICINE

## 2023-10-04 PROCEDURE — 3074F SYST BP LT 130 MM HG: CPT | Performed by: INTERNAL MEDICINE

## 2023-10-04 PROCEDURE — 1123F ACP DISCUSS/DSCN MKR DOCD: CPT | Performed by: INTERNAL MEDICINE

## 2023-10-04 PROCEDURE — 3078F DIAST BP <80 MM HG: CPT | Performed by: INTERNAL MEDICINE

## 2023-10-04 PROCEDURE — G8484 FLU IMMUNIZE NO ADMIN: HCPCS | Performed by: INTERNAL MEDICINE

## 2023-10-04 PROCEDURE — G0439 PPPS, SUBSEQ VISIT: HCPCS | Performed by: INTERNAL MEDICINE

## 2023-10-04 PROCEDURE — 90694 VACC AIIV4 NO PRSRV 0.5ML IM: CPT | Performed by: INTERNAL MEDICINE

## 2023-10-04 PROCEDURE — G0008 ADMIN INFLUENZA VIRUS VAC: HCPCS | Performed by: INTERNAL MEDICINE

## 2023-10-04 ASSESSMENT — ENCOUNTER SYMPTOMS
BLOOD IN STOOL: 0
PHOTOPHOBIA: 0
APNEA: 0
CHOKING: 0
ABDOMINAL DISTENTION: 0
FACIAL SWELLING: 0

## 2023-10-04 ASSESSMENT — PATIENT HEALTH QUESTIONNAIRE - PHQ9
SUM OF ALL RESPONSES TO PHQ QUESTIONS 1-9: 0
1. LITTLE INTEREST OR PLEASURE IN DOING THINGS: 0
SUM OF ALL RESPONSES TO PHQ QUESTIONS 1-9: 0
SUM OF ALL RESPONSES TO PHQ QUESTIONS 1-9: 0
2. FEELING DOWN, DEPRESSED OR HOPELESS: 0
SUM OF ALL RESPONSES TO PHQ9 QUESTIONS 1 & 2: 0
SUM OF ALL RESPONSES TO PHQ QUESTIONS 1-9: 0
1. LITTLE INTEREST OR PLEASURE IN DOING THINGS: 0
SUM OF ALL RESPONSES TO PHQ QUESTIONS 1-9: 0
SUM OF ALL RESPONSES TO PHQ9 QUESTIONS 1 & 2: 0
SUM OF ALL RESPONSES TO PHQ QUESTIONS 1-9: 0
2. FEELING DOWN, DEPRESSED OR HOPELESS: 0

## 2023-10-04 ASSESSMENT — LIFESTYLE VARIABLES
HOW OFTEN DO YOU HAVE A DRINK CONTAINING ALCOHOL: MONTHLY OR LESS
HOW MANY STANDARD DRINKS CONTAINING ALCOHOL DO YOU HAVE ON A TYPICAL DAY: PATIENT DOES NOT DRINK

## 2023-10-16 ENCOUNTER — OFFICE VISIT (OUTPATIENT)
Dept: SPORTS MEDICINE | Age: 73
End: 2023-10-16
Payer: MEDICARE

## 2023-10-16 VITALS
BODY MASS INDEX: 35.88 KG/M2 | HEART RATE: 67 BPM | HEIGHT: 62 IN | OXYGEN SATURATION: 96 % | TEMPERATURE: 97.2 F | WEIGHT: 195 LBS

## 2023-10-16 DIAGNOSIS — M17.12 PRIMARY OSTEOARTHRITIS OF LEFT KNEE: Primary | ICD-10-CM

## 2023-10-16 DIAGNOSIS — M70.52 PATELLAR BURSITIS OF LEFT KNEE: ICD-10-CM

## 2023-10-16 PROCEDURE — 99214 OFFICE O/P EST MOD 30 MIN: CPT | Performed by: FAMILY MEDICINE

## 2023-10-16 PROCEDURE — 1123F ACP DISCUSS/DSCN MKR DOCD: CPT | Performed by: FAMILY MEDICINE

## 2023-10-16 PROCEDURE — G8399 PT W/DXA RESULTS DOCUMENT: HCPCS | Performed by: FAMILY MEDICINE

## 2023-10-16 PROCEDURE — 1036F TOBACCO NON-USER: CPT | Performed by: FAMILY MEDICINE

## 2023-10-16 PROCEDURE — 3017F COLORECTAL CA SCREEN DOC REV: CPT | Performed by: FAMILY MEDICINE

## 2023-10-16 PROCEDURE — G8484 FLU IMMUNIZE NO ADMIN: HCPCS | Performed by: FAMILY MEDICINE

## 2023-10-16 PROCEDURE — 1090F PRES/ABSN URINE INCON ASSESS: CPT | Performed by: FAMILY MEDICINE

## 2023-10-16 PROCEDURE — G8427 DOCREV CUR MEDS BY ELIG CLIN: HCPCS | Performed by: FAMILY MEDICINE

## 2023-10-16 PROCEDURE — G8417 CALC BMI ABV UP PARAM F/U: HCPCS | Performed by: FAMILY MEDICINE

## 2023-10-16 ASSESSMENT — ENCOUNTER SYMPTOMS
SHORTNESS OF BREATH: 0
NAUSEA: 0
DIARRHEA: 0
CONSTIPATION: 0
BACK PAIN: 0

## 2023-10-16 NOTE — PROGRESS NOTES
[facility] ([facility]) Physicians  Neurosurgery and Pain [facility]  [address] , [address] [address] [facility], [address] [place] [address] [place]: [phone]  F: [phone]      Leola Ring  (1950)    10/16/2023    Subjective:     Leola Ring is 67 y.o. female who complains today of:    Chief Complaint   Patient presents with    Knee Pain     LEFT    Other     RECENT FALL-APPROXIMATELY 1 WEEK AGO       HPI     This patient comes in stating that she was improving with a Durolane injection and then fell right on her knee about a week and a half ago says it seems to be about the same now if not a little bit worse she is able to walk on it  Allergies:  Lidocaine, Novocain [procaine], and Shrimp (diagnostic)    Past Medical History:   Diagnosis Date    Acid reflux     Chronic back pain     COVID-19 06/2021    Hyperlipidemia     Hypertension     Osteoarthritis     shots in knees by ortho     Past Surgical History:   Procedure Laterality Date    APPENDECTOMY  1959    BREAST BIOPSY      BREAST SURGERY      COLONOSCOPY N/A 8/10/2023    COLORECTAL CANCER SCREENING, NOT HIGH RISK performed by Lamont Hermosillo MD at [address] [place] [address] ([address] [place] [address])  [facility] SCRN NOT HI [address] IND N/A 5/9/2018    COLONOSCOPY performed by Lamont Hermosillo MD at [facility]     Family History   Problem Relation Age of Onset    Colon Cancer Neg Hx      Social History     Socioeconomic History    Marital status:      Spouse name: Not on file    Number of children: Not on file    Years of education: Not on file    Highest education level: Not on file   Occupational History    Not on file   Tobacco Use    Smoking status: Never    Smokeless tobacco: Never   Substance and Sexual Activity    Alcohol use: Not Currently     Comment: rarely    Drug use: No    Sexual activity: Not on file   Other Topics Concern    Not on file   Social History Narrative    Not on file

## 2023-10-31 NOTE — TELEPHONE ENCOUNTER
She said you asked her if she needed this at her last appointment but she didn't need it then but needs it now?

## 2023-11-01 RX ORDER — IBUPROFEN 800 MG/1
800 TABLET ORAL 2 TIMES DAILY PRN
Qty: 60 TABLET | Refills: 5 | Status: SHIPPED | OUTPATIENT
Start: 2023-11-01

## 2023-11-06 ENCOUNTER — PROCEDURE VISIT (OUTPATIENT)
Dept: PAIN MANAGEMENT | Age: 73
End: 2023-11-06
Payer: MEDICARE

## 2023-11-06 DIAGNOSIS — M17.12 ARTHRITIS OF LEFT KNEE: Primary | ICD-10-CM

## 2023-11-06 PROCEDURE — 77002 NEEDLE LOCALIZATION BY XRAY: CPT | Performed by: PAIN MEDICINE

## 2023-11-06 PROCEDURE — 20610 DRAIN/INJ JOINT/BURSA W/O US: CPT | Performed by: PAIN MEDICINE

## 2023-11-06 RX ORDER — BETAMETHASONE SODIUM PHOSPHATE AND BETAMETHASONE ACETATE 3; 3 MG/ML; MG/ML
6 INJECTION, SUSPENSION INTRA-ARTICULAR; INTRALESIONAL; INTRAMUSCULAR; SOFT TISSUE ONCE
Status: COMPLETED | OUTPATIENT
Start: 2023-11-06 | End: 2023-11-06

## 2023-11-06 RX ORDER — LIDOCAINE HYDROCHLORIDE 10 MG/ML
3 INJECTION, SOLUTION EPIDURAL; INFILTRATION; INTRACAUDAL; PERINEURAL ONCE
Status: COMPLETED | OUTPATIENT
Start: 2023-11-06 | End: 2023-11-06

## 2023-11-06 RX ADMIN — LIDOCAINE HYDROCHLORIDE 3 ML: 10 INJECTION, SOLUTION EPIDURAL; INFILTRATION; INTRACAUDAL; PERINEURAL at 13:13

## 2023-11-06 RX ADMIN — BETAMETHASONE SODIUM PHOSPHATE AND BETAMETHASONE ACETATE 6 MG: 3; 3 INJECTION, SUSPENSION INTRA-ARTICULAR; INTRALESIONAL; INTRAMUSCULAR; SOFT TISSUE at 13:14

## 2023-11-06 NOTE — PROGRESS NOTES
[facility] [employer]  Spine Surgery  Advanced Pain Management           Provider: Homa Khan DO          Patient Name: Angelita Morfin : 1950        Date: 2023         PROCEDURE: Left knee injection under fluoroscopic guidance    Description of Procedure: The area was cleaned with Betadine and alcohol. Sterile technique  was used. Landmarks were identified. Superolateral approach was used. After negative aspiration,  6 mg of Celestone along with 2 mL  of 1% preservative-free lidocaine was injected without difficulty. The  patient  tolerated the procedure well. No obvious complications occurred during the procedure. Homa Khan, [address] [place] [address] [id number] [place] [address],[address], [address]  Phone [phone]/[phone] [email or web address]

## 2023-12-25 DIAGNOSIS — E78.00 PURE HYPERCHOLESTEROLEMIA: ICD-10-CM

## 2023-12-25 RX ORDER — SIMVASTATIN 10 MG
TABLET ORAL
Qty: 90 TABLET | Refills: 2 | Status: SHIPPED | OUTPATIENT
Start: 2023-12-25

## 2024-01-22 ENCOUNTER — OFFICE VISIT (OUTPATIENT)
Dept: PRIMARY CARE CLINIC | Age: 74
End: 2024-01-22
Payer: COMMERCIAL

## 2024-01-22 VITALS
SYSTOLIC BLOOD PRESSURE: 120 MMHG | HEIGHT: 62 IN | WEIGHT: 195 LBS | HEART RATE: 68 BPM | RESPIRATION RATE: 18 BRPM | DIASTOLIC BLOOD PRESSURE: 74 MMHG | BODY MASS INDEX: 35.88 KG/M2 | TEMPERATURE: 98.1 F | OXYGEN SATURATION: 97 %

## 2024-01-22 DIAGNOSIS — F33.1 MAJOR DEPRESSIVE DISORDER, RECURRENT, MODERATE (HCC): ICD-10-CM

## 2024-01-22 DIAGNOSIS — E66.01 SEVERE OBESITY (BMI 35.0-39.9) WITH COMORBIDITY (HCC): ICD-10-CM

## 2024-01-22 DIAGNOSIS — R22.32 LOCALIZED SWELLING ON LEFT HAND: ICD-10-CM

## 2024-01-22 DIAGNOSIS — I10 PRIMARY HYPERTENSION: Primary | ICD-10-CM

## 2024-01-22 DIAGNOSIS — F33.0 MAJOR DEPRESSIVE DISORDER, RECURRENT, MILD (HCC): ICD-10-CM

## 2024-01-22 LAB — HBA1C MFR BLD: 6.3 %

## 2024-01-22 PROCEDURE — 3074F SYST BP LT 130 MM HG: CPT | Performed by: INTERNAL MEDICINE

## 2024-01-22 PROCEDURE — 83036 HEMOGLOBIN GLYCOSYLATED A1C: CPT | Performed by: INTERNAL MEDICINE

## 2024-01-22 PROCEDURE — 99214 OFFICE O/P EST MOD 30 MIN: CPT | Performed by: INTERNAL MEDICINE

## 2024-01-22 PROCEDURE — 1123F ACP DISCUSS/DSCN MKR DOCD: CPT | Performed by: INTERNAL MEDICINE

## 2024-01-22 PROCEDURE — 3078F DIAST BP <80 MM HG: CPT | Performed by: INTERNAL MEDICINE

## 2024-01-22 RX ORDER — LOSARTAN POTASSIUM 25 MG/1
25 TABLET ORAL DAILY
Qty: 30 TABLET | Refills: 3 | Status: SHIPPED | OUTPATIENT
Start: 2024-01-22

## 2024-01-22 ASSESSMENT — PATIENT HEALTH QUESTIONNAIRE - PHQ9
2. FEELING DOWN, DEPRESSED OR HOPELESS: 0
6. FEELING BAD ABOUT YOURSELF - OR THAT YOU ARE A FAILURE OR HAVE LET YOURSELF OR YOUR FAMILY DOWN: 0
SUM OF ALL RESPONSES TO PHQ QUESTIONS 1-9: 0
10. IF YOU CHECKED OFF ANY PROBLEMS, HOW DIFFICULT HAVE THESE PROBLEMS MADE IT FOR YOU TO DO YOUR WORK, TAKE CARE OF THINGS AT HOME, OR GET ALONG WITH OTHER PEOPLE: 0
3. TROUBLE FALLING OR STAYING ASLEEP: 0
SUM OF ALL RESPONSES TO PHQ9 QUESTIONS 1 & 2: 0
8. MOVING OR SPEAKING SO SLOWLY THAT OTHER PEOPLE COULD HAVE NOTICED. OR THE OPPOSITE, BEING SO FIGETY OR RESTLESS THAT YOU HAVE BEEN MOVING AROUND A LOT MORE THAN USUAL: 0
5. POOR APPETITE OR OVEREATING: 0
1. LITTLE INTEREST OR PLEASURE IN DOING THINGS: 0
SUM OF ALL RESPONSES TO PHQ QUESTIONS 1-9: 0
9. THOUGHTS THAT YOU WOULD BE BETTER OFF DEAD, OR OF HURTING YOURSELF: 0
4. FEELING TIRED OR HAVING LITTLE ENERGY: 0
7. TROUBLE CONCENTRATING ON THINGS, SUCH AS READING THE NEWSPAPER OR WATCHING TELEVISION: 0
SUM OF ALL RESPONSES TO PHQ QUESTIONS 1-9: 0
SUM OF ALL RESPONSES TO PHQ QUESTIONS 1-9: 0

## 2024-01-22 ASSESSMENT — ENCOUNTER SYMPTOMS
SINUS PAIN: 0
SINUS PRESSURE: 0
VOMITING: 0
NAUSEA: 0
SORE THROAT: 0
SHORTNESS OF BREATH: 0
ABDOMINAL PAIN: 0
RHINORRHEA: 0
DIARRHEA: 0
COUGH: 0
WHEEZING: 0

## 2024-01-22 NOTE — PROGRESS NOTES
or TTP, joint ROM full in flexion, extension adduction and abduction   Neurological:      Mental Status: She is alert.       Assessment:       Diagnosis Orders   1. Primary hypertension Inadequately Controlled losartan (COZAAR) 25 MG tablet    POCT glycosylated hemoglobin (Hb A1C)    will add another med to amlodipine       2. Localized swelling on left hand  XR HAND LEFT (MIN 3 VIEWS)    XR WRIST LEFT (MIN 3 VIEWS)    r/o fracture   ?? tendonitis       3. Major depressive disorder, recurrent, mild (HCC) Controlled       4. Major depressive disorder, recurrent, moderate (HCC) Controlled       5. Severe obesity (BMI 35.0-39.9) with comorbidity (HCC) Inadequately Controlled POCT glycosylated hemoglobin (Hb A1C)    lifestyle modification advised         Plan:      Orders Placed This Encounter   Procedures    XR HAND LEFT (MIN 3 VIEWS)     Standing Status:   Future     Number of Occurrences:   1     Standing Expiration Date:   1/22/2025     Order Specific Question:   Reason for exam:     Answer:   lateral metacarpal swelling    XR WRIST LEFT (MIN 3 VIEWS)     Standing Status:   Future     Number of Occurrences:   1     Standing Expiration Date:   1/22/2025    POCT glycosylated hemoglobin (Hb A1C)     Orders Placed This Encounter   Medications    losartan (COZAAR) 25 MG tablet     Sig: Take 1 tablet by mouth daily     Dispense:  30 tablet     Refill:  3     Return in about 2 weeks (around 2/5/2024) for BP recheck, with PCP.

## 2024-02-05 ENCOUNTER — OFFICE VISIT (OUTPATIENT)
Dept: PRIMARY CARE CLINIC | Age: 74
End: 2024-02-05
Payer: COMMERCIAL

## 2024-02-05 VITALS
OXYGEN SATURATION: 99 % | TEMPERATURE: 98.1 F | RESPIRATION RATE: 15 BRPM | HEART RATE: 62 BPM | SYSTOLIC BLOOD PRESSURE: 124 MMHG | BODY MASS INDEX: 35.15 KG/M2 | WEIGHT: 191 LBS | HEIGHT: 62 IN | DIASTOLIC BLOOD PRESSURE: 66 MMHG

## 2024-02-05 DIAGNOSIS — I10 PRIMARY HYPERTENSION: Primary | ICD-10-CM

## 2024-02-05 PROCEDURE — 1123F ACP DISCUSS/DSCN MKR DOCD: CPT | Performed by: INTERNAL MEDICINE

## 2024-02-05 PROCEDURE — 3074F SYST BP LT 130 MM HG: CPT | Performed by: INTERNAL MEDICINE

## 2024-02-05 PROCEDURE — 3078F DIAST BP <80 MM HG: CPT | Performed by: INTERNAL MEDICINE

## 2024-02-05 PROCEDURE — 99213 OFFICE O/P EST LOW 20 MIN: CPT | Performed by: INTERNAL MEDICINE

## 2024-02-05 RX ORDER — UBIDECARENONE 75 MG
50 CAPSULE ORAL DAILY
COMMUNITY

## 2024-02-05 ASSESSMENT — ENCOUNTER SYMPTOMS
SINUS PRESSURE: 0
VOMITING: 0
SHORTNESS OF BREATH: 0
SINUS PAIN: 0
DIARRHEA: 0
COUGH: 0
SORE THROAT: 0
WHEEZING: 0
RHINORRHEA: 0
ABDOMINAL PAIN: 0
NAUSEA: 0

## 2024-02-05 NOTE — PROGRESS NOTES
Subjective:      Patient ID: Lenora Dubose is a 73 y.o. female    Follow up   HPI  Pt presents for follow up regarding treatment of hypertension. Now controlled upon addition of losartan to amlodipine. No lightheadedness or GI side effects. No chest pain or SOB, no one sided weakness or slurred speech  Past Medical History:   Diagnosis Date    Acid reflux     Chronic back pain     COVID-19 06/2021    Hyperlipidemia     Hypertension     Osteoarthritis     shots in knees by ortho     Past Surgical History:   Procedure Laterality Date    APPENDECTOMY  1959    BREAST BIOPSY      BREAST SURGERY      COLONOSCOPY N/A 8/10/2023    COLORECTAL CANCER SCREENING, NOT HIGH RISK performed by Nel Schneider MD at [facility]    HYSTERECTOMY (CERVIX STATUS UNKNOWN)  1992    OVARY REMOVAL      [place] COLON CA SCRN NOT HI RSK IND N/A 5/9/2018    COLONOSCOPY performed by Nel Schneider MD at [facility]     Social History     Socioeconomic History    Marital status:      Spouse name: Not on file    Number of children: Not on file    Years of education: Not on file    Highest education level: Not on file   Occupational History    Not on file   Tobacco Use    Smoking status: Never    Smokeless tobacco: Never   Substance and Sexual Activity    Alcohol use: Not Currently     Comment: rarely    Drug use: No    Sexual activity: Not on file   Other Topics Concern    Not on file   Social History Narrative    Not on file     Social Determinants of Health     Financial Resource Strain: Low Risk  (4/5/2023)    Overall Financial Resource Strain (CARDIA)     Difficulty of Paying Living Expenses: Not hard at all   Food Insecurity: Not on file (4/5/2023)   Transportation Needs: Unknown (4/5/2023)    PRAPARE - Transportation     Lack of Transportation (Medical): Not on file     Lack of Transportation (Non-Medical): No   Physical Activity: Sufficiently Active (10/4/2023)    Exercise Vital Sign     Days of Exercise per Week:

## 2024-02-12 ENCOUNTER — TELEPHONE (OUTPATIENT)
Dept: PRIMARY CARE CLINIC | Age: 74
End: 2024-02-12

## 2024-02-12 NOTE — TELEPHONE ENCOUNTER
She said the IC Losartan potassium 25 mg does not work quick enough when her blood pressure is up? She said the Amlodipine 2.5 works quicker to get it to go down? She has been using Losartan 25 mg at night and Amlodipine 2.5 mg in the mornings. Please advise what she needs to do? Her high reading was 172/104. After taking the Amlodipine 2.5, it dropped to 124/61 after 45 minutes.

## 2024-02-14 DIAGNOSIS — I10 PRIMARY HYPERTENSION: Primary | ICD-10-CM

## 2024-02-14 RX ORDER — AMLODIPINE BESYLATE 5 MG/1
5 TABLET ORAL DAILY
Qty: 90 TABLET | Refills: 3 | Status: SHIPPED | OUTPATIENT
Start: 2024-02-14 | End: 2024-02-16

## 2024-02-16 ENCOUNTER — OFFICE VISIT (OUTPATIENT)
Dept: PRIMARY CARE CLINIC | Age: 74
End: 2024-02-16

## 2024-02-16 VITALS
DIASTOLIC BLOOD PRESSURE: 74 MMHG | OXYGEN SATURATION: 98 % | HEART RATE: 65 BPM | WEIGHT: 191 LBS | BODY MASS INDEX: 35.15 KG/M2 | SYSTOLIC BLOOD PRESSURE: 122 MMHG | HEIGHT: 62 IN

## 2024-02-16 DIAGNOSIS — Z23 NEED FOR PROPHYLACTIC VACCINATION AND INOCULATION AGAINST VARICELLA: ICD-10-CM

## 2024-02-16 DIAGNOSIS — Z12.31 ENCOUNTER FOR SCREENING MAMMOGRAM FOR MALIGNANT NEOPLASM OF BREAST: ICD-10-CM

## 2024-02-16 DIAGNOSIS — I10 PRIMARY HYPERTENSION: ICD-10-CM

## 2024-02-16 DIAGNOSIS — M85.89 OTHER SPECIFIED DISORDERS OF BONE DENSITY AND STRUCTURE, MULTIPLE SITES: ICD-10-CM

## 2024-02-16 DIAGNOSIS — Z00.00 ROUTINE ADULT HEALTH MAINTENANCE: Primary | ICD-10-CM

## 2024-02-16 DIAGNOSIS — M85.80 OSTEOPENIA, UNSPECIFIED LOCATION: ICD-10-CM

## 2024-02-16 RX ORDER — CHOLECALCIFEROL (VITAMIN D3) 50 MCG
2000 TABLET ORAL DAILY
Qty: 90 TABLET | Refills: 5 | Status: SHIPPED | OUTPATIENT
Start: 2024-02-16

## 2024-02-16 RX ORDER — AMLODIPINE BESYLATE AND BENAZEPRIL HYDROCHLORIDE 5; 20 MG/1; MG/1
1 CAPSULE ORAL DAILY
Qty: 30 CAPSULE | Refills: 5 | Status: SHIPPED | OUTPATIENT
Start: 2024-02-16

## 2024-02-16 NOTE — PATIENT INSTRUCTIONS
Have labwork done, fast for 8 hours beforehand, you may have black coffee or water only. Our clinic will contact you when results are made available.     Stop taking losartan and amlodipine. Start taking Amlodipine/benazepril 5-20 mg once daily  Please check your blood pressure 1-2 times daily for 4 weeks, record the numbers, and follow-up with your results     Have mammogram and DEXA scan scheduled    Shingles vaccine prescription sent to pharmacy, you will need a booster in 2 to 6 months and then you will be done for life.     I would appreciate it if you could please complete survey in [employer] about your experience today and my service!

## 2024-02-16 NOTE — PROGRESS NOTES
expresses understanding and desires to proceed.    Close follow up to evaluate treatment results and for coordination of care.  I have reviewed the patient's medical history in detail and updated the computerized patient record.    Remy Sosa MD

## 2024-02-27 ENCOUNTER — HOSPITAL ENCOUNTER (OUTPATIENT)
Dept: WOMENS IMAGING | Age: 74
Discharge: HOME | End: 2024-02-29
Payer: COMMERCIAL

## 2024-02-27 VITALS — HEIGHT: 62 IN | BODY MASS INDEX: 34.93 KG/M2

## 2024-02-27 DIAGNOSIS — Z12.31 ENCOUNTER FOR SCREENING MAMMOGRAM FOR MALIGNANT NEOPLASM OF BREAST: ICD-10-CM

## 2024-02-27 DIAGNOSIS — M85.89 OTHER SPECIFIED DISORDERS OF BONE DENSITY AND STRUCTURE, MULTIPLE SITES: ICD-10-CM

## 2024-02-27 DIAGNOSIS — M85.80 OSTEOPENIA, UNSPECIFIED LOCATION: ICD-10-CM

## 2024-02-27 PROCEDURE — 77080 DXA BONE DENSITY AXIAL: CPT

## 2024-02-27 PROCEDURE — 77063 BREAST TOMOSYNTHESIS BI: CPT

## 2024-02-28 NOTE — PATIENT INSTRUCTIONS
These can increase your chances of quitting for good. · Limit alcohol to 2 drinks a day for men and 1 drink a day for women. Too much alcohol can cause health problems. If you have a BMI higher than 25  · Your doctor may do other tests to check your risk for weight-related health problems. This may include measuring the distance around your waist. A waist measurement of more than 40 inches in men or 35 inches in women can increase the risk of weight-related health problems. · Talk with your doctor about steps you can take to stay healthy or improve your health. You may need to make lifestyle changes to lose weight and stay healthy, such as changing your diet and getting regular exercise. If you have a BMI lower than 18.5  · Your doctor may do other tests to check your risk for health problems. · Talk with your doctor about steps you can take to stay healthy or improve your health. You may need to make lifestyle changes to gain or maintain weight and stay healthy, such as getting more healthy foods in your diet and doing exercises to build muscle. Where can you learn more? Go to [email or web address] org and sign in to your SDI account. Enter S176 in the [employer] box to learn more about \"Body Mass Index: Care Instructions. \"     If you do not have an account, please click on the \"Sign Up Now\" link. Current as of: March 28, 2019  Content Version: 12.1  © 4783-3249 Healthwise, Incorporated. Care instructions adapted under license by [facility] ([facility]). If you have questions about a medical condition or this instruction, always ask your healthcare professional. [place] [address] any warranty or liability for your use of this information. Learning About Healthy Weight  What is a healthy weight? A healthy weight is the weight at which you feel good about yourself and have energy for work and play.  It's also one that lowers your risk for health variety of foods from the basic food groups: grains, vegetables, fruits, milk and milk products, and meat and beans. Some people may eat more of their favorite foods from only one food group and, as a result, miss getting the nutrients they need. So, it is important to pay attention not only to what you eat but also to what you are missing from your diet. You can eat a healthy, balanced diet by making a few small changes. Follow-up care is a key part of your treatment and safety. Be sure to make and go to all appointments, and call your doctor if you are having problems. It's also a good idea to know your test results and keep a list of the medicines you take. How can you care for yourself at home? Look at what you eat  · Keep a food diary for a week or two and record everything you eat or drink. Track the number of servings you eat from each food group. · For a balanced diet every day, eat a variety of:  ? 6 or more ounce-equivalents of grains, such as cereals, breads, crackers, rice, or pasta, every day. An ounce-equivalent is 1 slice of bread, 1 cup of ready-to-eat cereal, or ½ cup of cooked rice, cooked pasta, or cooked cereal.  ? 2½ cups of vegetables, especially:  § Dark-green vegetables such as broccoli and spinach. § Orange vegetables such as carrots and sweet potatoes. § Dry beans (such as leon and kidney beans) and peas (such as lentils). ? 2 cups of fresh, frozen, or canned fruit. A small apple or 1 banana or orange equals 1 cup. ? 3 cups of nonfat or low-fat milk, yogurt, or other milk products. ? 5½ ounces of meat and beans, such as chicken, fish, lean meat, beans, nuts, and seeds. One egg, 1 tablespoon of peanut butter, ½ ounce nuts or seeds, or ¼ cup of cooked beans equals 1 ounce of meat. · Learn how to read food labels for serving sizes and ingredients. Fast-food and convenience-food meals often contain few or no fruits or vegetables.  Make sure you eat some fruits and vegetables to make the meal more nutritious. · Look at your food diary. For each food group, add up what you have eaten and then divide the total by the number of days. This will give you an idea of how much you are eating from each food group. See if you can find some ways to change your diet to make it more healthy. Start small  · Do not try to make dramatic changes to your diet all at once. You might feel that you are missing out on your favorite foods and then be more likely to fail. · Start slowly, and gradually change your habits. Try some of the following:  ? Use whole wheat bread instead of white bread. ? Use nonfat or low-fat milk instead of whole milk. ? Eat brown rice instead of white rice, and eat whole wheat pasta instead of white-flour pasta. ? Try low-fat cheeses and low-fat yogurt. ? Add more fruits and vegetables to meals and have them for snacks. ? Add lettuce, tomato, cucumber, and onion to sandwiches. ? Add fruit to yogurt and cereal.  Enjoy food  · You can still eat your favorite foods. You just may need to eat less of them. If your favorite foods are high in fat, salt, and sugar, limit how often you eat them, but do not cut them out entirely. · Eat a wide variety of foods. Make healthy choices when eating out  · The type of restaurant you choose can help you make healthy choices. Even fast-food chains are now offering more low-fat or healthier choices on the menu. · Choose smaller portions, or take half of your meal home. · When eating out, try:  ? A veggie pizza with a whole wheat crust or grilled chicken (instead of sausage or pepperoni). ? Pasta with roasted vegetables, grilled chicken, or marinara sauce instead of cream sauce. ? A vegetable wrap or grilled chicken wrap. ? Broiled or poached food instead of fried or breaded items.   Make healthy choices easy  · Buy packaged, prewashed, ready-to-eat fresh vegetables and fruits, such as baby carrots, salad mixes, and chopped or shredded broccoli (0) Absent effects of medicine (eg, dizziness, blurred vision)especially medicines like prescription pain medicines and drugs used to treat mental health conditions   Depending on the brittleness of your bones, the consequences of a fall can be serious and long lasting. Home Life   Research by the Association of Aging [facility]) shows that some home accidents among older adults can be prevented by making simple lifestyle changes and basic modifications and repairs to the home environment. Here are some lifestyle changes that experts recommend:   Have your hearing and vision checked regularly. Be sure to wear prescription glasses that are right for you. Speak to your doctor or pharmacist about the possible side effects of your medicines. A number of medicines can cause dizziness. If you have problems with sleep, talk to your doctor. Limit your intake of alcohol. If necessary, use a cane or walker to help maintain your balance. Wear supportive, rubber-soled shoes, even at home. If you live in a region that gets wintry weather, you may want to put special cleats on your shoes to prevent you from slipping on the snow and ice. Exercise regularly to help maintain muscle tone, agility, and balance. Always hold the banister when going up or down stairs. Also, use  bars when getting in or out of the bath or shower, or using the toilet. To avoid dizziness, get up slowly from a lying down position. Sit up first, dangling your legs for a minute or two before rising to a standing position. Overall Home Safety Check   According to the Consumer Product Safety Commision's \"Older Consumer Home Safety Checklist,\" it is important to check for potential hazards in each room. And remember, proper lighting is an essential factor in home safety. If you cannot see clearly, you are more likely to fall.    Important questions to ask yourself include:   Are lamp, electric, extension, and telephone cords placed out of the flow of

## 2024-03-15 ENCOUNTER — OFFICE VISIT (OUTPATIENT)
Dept: PRIMARY CARE CLINIC | Age: 74
End: 2024-03-15

## 2024-03-15 VITALS
HEART RATE: 72 BPM | HEIGHT: 62 IN | SYSTOLIC BLOOD PRESSURE: 112 MMHG | BODY MASS INDEX: 34.78 KG/M2 | DIASTOLIC BLOOD PRESSURE: 62 MMHG | OXYGEN SATURATION: 97 % | WEIGHT: 189 LBS

## 2024-03-15 VITALS
HEIGHT: 62 IN | SYSTOLIC BLOOD PRESSURE: 112 MMHG | DIASTOLIC BLOOD PRESSURE: 62 MMHG | HEART RATE: 72 BPM | WEIGHT: 189.6 LBS | OXYGEN SATURATION: 97 % | BODY MASS INDEX: 34.89 KG/M2

## 2024-03-15 DIAGNOSIS — R41.3 MEMORY LOSS: ICD-10-CM

## 2024-03-15 DIAGNOSIS — M17.12 PRIMARY OSTEOARTHRITIS OF LEFT KNEE: ICD-10-CM

## 2024-03-15 DIAGNOSIS — Z00.00 MEDICARE ANNUAL WELLNESS VISIT, SUBSEQUENT: Primary | ICD-10-CM

## 2024-03-15 DIAGNOSIS — I10 PRIMARY HYPERTENSION: Primary | ICD-10-CM

## 2024-03-15 DIAGNOSIS — M25.532 LEFT WRIST PAIN: ICD-10-CM

## 2024-03-15 ASSESSMENT — LIFESTYLE VARIABLES
HOW OFTEN DO YOU HAVE A DRINK CONTAINING ALCOHOL: NEVER
HOW MANY STANDARD DRINKS CONTAINING ALCOHOL DO YOU HAVE ON A TYPICAL DAY: PATIENT DOES NOT DRINK
HOW OFTEN DO YOU HAVE A DRINK CONTAINING ALCOHOL: NEVER

## 2024-03-15 ASSESSMENT — PATIENT HEALTH QUESTIONNAIRE - PHQ9
6. FEELING BAD ABOUT YOURSELF - OR THAT YOU ARE A FAILURE OR HAVE LET YOURSELF OR YOUR FAMILY DOWN: 0
SUM OF ALL RESPONSES TO PHQ QUESTIONS 1-9: 10
10. IF YOU CHECKED OFF ANY PROBLEMS, HOW DIFFICULT HAVE THESE PROBLEMS MADE IT FOR YOU TO DO YOUR WORK, TAKE CARE OF THINGS AT HOME, OR GET ALONG WITH OTHER PEOPLE: 0
8. MOVING OR SPEAKING SO SLOWLY THAT OTHER PEOPLE COULD HAVE NOTICED. OR THE OPPOSITE, BEING SO FIGETY OR RESTLESS THAT YOU HAVE BEEN MOVING AROUND A LOT MORE THAN USUAL: 2
1. LITTLE INTEREST OR PLEASURE IN DOING THINGS: 0
5. POOR APPETITE OR OVEREATING: 0
2. FEELING DOWN, DEPRESSED OR HOPELESS: 0
7. TROUBLE CONCENTRATING ON THINGS, SUCH AS READING THE NEWSPAPER OR WATCHING TELEVISION: 2
SUM OF ALL RESPONSES TO PHQ QUESTIONS 1-9: 10
SUM OF ALL RESPONSES TO PHQ QUESTIONS 1-9: 10
9. THOUGHTS THAT YOU WOULD BE BETTER OFF DEAD, OR OF HURTING YOURSELF: 0
4. FEELING TIRED OR HAVING LITTLE ENERGY: 3
3. TROUBLE FALLING OR STAYING ASLEEP: 3
SUM OF ALL RESPONSES TO PHQ9 QUESTIONS 1 & 2: 0
SUM OF ALL RESPONSES TO PHQ QUESTIONS 1-9: 10

## 2024-03-15 NOTE — PATIENT INSTRUCTIONS
Incorporated disclaims any warranty or liability for your use of this information.           Starting a Weight Loss Plan: Care Instructions  Overview     It can be a challenge to lose weight. But your doctor can help you make a weight-loss plan that meets your needs.  You don't have to make a lot of big changes at once. A better idea might be to focus on small changes and stick with them. When those changes become habit, you can add a few more changes.  Some people find it helpful to take an exercise or nutrition class. If you have questions, ask your doctor about seeing a registered dietitian or an exercise specialist. You might also think about joining a weight-loss support group.  If you're not ready to make changes right now, try to pick a date in the future. Then make an appointment with your doctor to talk about when and how you'll get started with a plan.  Follow-up care is a key part of your treatment and safety. Be sure to make and go to all appointments, and call your doctor if you are having problems. It's also a good idea to know your test results and keep a list of the medicines you take.  How can you care for yourself at home?  Set realistic goals. Many people expect to lose much more weight than is likely. A weight loss of 5% to 10% of your body weight may be enough to improve your health.  Get family and friends involved to provide support. Talk to them about why you are trying to lose weight, and ask them to help. They can help by participating in exercise and having meals with you, even if they may be eating something different.  Find what works best for you. If you do not have time or do not like to cook, a program that offers meal replacement bars or shakes may be better for you. Or if you like to prepare meals, finding a plan that includes daily menus and recipes may be best.  Ask your doctor about other health professionals who can help you achieve your weight loss goals.  A dietitian can help

## 2024-03-15 NOTE — PROGRESS NOTES
Medicare Annual Wellness Visit    Lenora Dubose is here for Medicare AWV    Assessment & Plan   Medicare annual wellness visit, subsequent  Recommendations for Preventive Services Due: see orders and patient instructions/AVS.  Recommended screening schedule for the next 5-10 years is provided to the patient in written form: see Patient Instructions/AVS.     Return in 1 year (on 3/15/2025).     Subjective       Patient's complete Health Risk Assessment and screening values have been reviewed and are found in Flowsheets. The following problems were reviewed today and where indicated follow up appointments were made and/or referrals ordered.    Positive Risk Factor Screenings with Interventions:    Fall Risk:  Do you feel unsteady or are you worried about falling? : (!) yes  2 or more falls in past year?: no  Fall with injury in past year?: (!) yes     Interventions:    Reviewed medications, home hazards, visual acuity, and co-morbidities that can increase risk for falls     Depression:  PHQ-2 Score: 0  PHQ-9 Total Score: 10    Interpretation:  5-9 mild   10-14 moderate   15-19 moderately severe   20-27 severe     Interventions:  Life style changes to improve mood reviewed          General HRA Questions:  Select all that apply: (!) New or Increased Pain, New or Increased Fatigue    Pain Interventions:  Patient comments: Reports that she will make appointment with her orthopedic    Fatigue Interventions:  Patient advised to follow up in the office for further evaluation and treatment      Activity, Diet, and Weight:  On average, how many days per week do you engage in moderate to strenuous exercise (like a brisk walk)?: 2 days  On average, how many minutes do you engage in exercise at this level?: 20 min    Do you eat balanced/healthy meals regularly?: Yes    Body mass index is 34.57 kg/m². (!) Abnormal    Obesity Interventions:  Patient comments: Reports she is following diet and losing weight            Dentist

## 2024-03-15 NOTE — PROGRESS NOTES
daily       No current facility-administered medications for this visit.       PMH, Surgical Hx, Family Hx, and Social Hx reviewed and updated.  Health Maintenance reviewed.    Vitals:    03/15/24 1200   BP: 112/62   Site: Left Upper Arm   Position: Sitting   Cuff Size: Medium Adult   Pulse: 72   SpO2: 97%   Weight: 86 kg (189 lb 9.6 oz)   Height: 1.575 m (5' 2\")     BP Readings from Last 3 Encounters:   03/15/24 112/62   03/15/24 112/62   02/16/24 122/74     Wt Readings from Last 3 Encounters:   03/15/24 85.7 kg (189 lb)   03/15/24 86 kg (189 lb 9.6 oz)   02/16/24 86.6 kg (191 lb)       Lab Results   Component Value Date    LABA1C 6.3 01/22/2024    LABA1C 6.1 (H) 09/21/2022    LABA1C 6.0 (H) 09/20/2021     Lab Results   Component Value Date    CREATININE 0.55 09/21/2022     Lab Results   Component Value Date    ALT 17 09/21/2022    AST 18 09/21/2022     Lab Results   Component Value Date    CHOL 180 09/20/2021    TRIG 101 09/20/2021    HDL 42 09/21/2022    LDLCALC 119 09/21/2022        Review of Systems   Physical Exam  Constitutional:       General: She is not in acute distress.     Appearance: Normal appearance. She is not toxic-appearing.   HENT:      Right Ear: External ear normal.      Left Ear: External ear normal.      Mouth/Throat:      Mouth: Mucous membranes are moist.   Eyes:      Extraocular Movements: Extraocular movements intact.      Pupils: Pupils are equal, round, and reactive to light.   Neck:      Vascular: No carotid bruit.   Cardiovascular:      Rate and Rhythm: Normal rate and regular rhythm.      Pulses: Normal pulses.      Heart sounds: Normal heart sounds.   Pulmonary:      Effort: No respiratory distress.      Breath sounds: No wheezing.   Abdominal:      General: Bowel sounds are normal.      Palpations: Abdomen is soft.      Tenderness: There is no abdominal tenderness.   Musculoskeletal:         General: Tenderness (Over the first digits of the left hand metacarpal joint dorsal and

## 2024-04-22 ENCOUNTER — OFFICE VISIT (OUTPATIENT)
Dept: PAIN MANAGEMENT | Age: 74
End: 2024-04-22
Payer: COMMERCIAL

## 2024-04-22 VITALS
SYSTOLIC BLOOD PRESSURE: 134 MMHG | DIASTOLIC BLOOD PRESSURE: 68 MMHG | TEMPERATURE: 97 F | WEIGHT: 189 LBS | BODY MASS INDEX: 34.78 KG/M2 | HEIGHT: 62 IN

## 2024-04-22 DIAGNOSIS — M17.12 ARTHRITIS OF LEFT KNEE: Primary | ICD-10-CM

## 2024-04-22 PROCEDURE — 99213 OFFICE O/P EST LOW 20 MIN: CPT | Performed by: NURSE PRACTITIONER

## 2024-04-22 PROCEDURE — 1123F ACP DISCUSS/DSCN MKR DOCD: CPT | Performed by: NURSE PRACTITIONER

## 2024-04-22 ASSESSMENT — ENCOUNTER SYMPTOMS
EYES NEGATIVE: 1
GASTROINTESTINAL NEGATIVE: 1
DIARRHEA: 0
SHORTNESS OF BREATH: 0
COUGH: 0
CONSTIPATION: 0
TROUBLE SWALLOWING: 0

## 2024-04-22 NOTE — PROGRESS NOTES
Lenora Dubose  (1950)    4/22/2024    Subjective:     Lenora Dubose is 73 y.o. female who complains today of:    Chief Complaint   Patient presents with    Follow-up    Knee Pain         Allergies:  Lidocaine, Novocain [procaine], and Shrimp (diagnostic)    Past Medical History:   Diagnosis Date    Acid reflux     Chronic back pain     COVID-19 06/2021    Hyperlipidemia     Hypertension     Osteoarthritis     shots in knees by ortho     Past Surgical History:   Procedure Laterality Date    APPENDECTOMY  1959    BREAST BIOPSY      BREAST SURGERY      COLONOSCOPY N/A 8/10/2023    COLORECTAL CANCER SCREENING, NOT HIGH RISK performed by Nel Schneider MD at [facility]    HYSTERECTOMY (CERVIX STATUS UNKNOWN)  1992    OVARY REMOVAL      [place] COLON [place] SCRN NOT HI RSK IND N/A 5/9/2018    COLONOSCOPY performed by Nel Schneider MD at [facility]     Family History   Problem Relation Age of Onset    Colon Cancer Neg Hx      Social History     Socioeconomic History    Marital status:      Spouse name: Not on file    Number of children: Not on file    Years of education: Not on file    Highest education level: Not on file   Occupational History    Not on file   Tobacco Use    Smoking status: Never    Smokeless tobacco: Never   Vaping Use    Vaping Use: Never used   Substance and Sexual Activity    Alcohol use: Not Currently     Comment: rarely    Drug use: No    Sexual activity: Not Currently   Other Topics Concern    Not on file   Social History Narrative    Not on file     Social Determinants of Health     Financial Resource Strain: Low Risk  (4/5/2023)    Overall Financial Resource Strain (CARDIA)     Difficulty of Paying Living Expenses: Not hard at all   Food Insecurity: Not on file (4/5/2023)   Transportation Needs: Unknown (4/5/2023)    PRAPARE - Transportation     Lack of Transportation (Medical): Not on file     Lack of Transportation (Non-Medical): No   Physical

## 2024-04-23 ENCOUNTER — TELEPHONE (OUTPATIENT)
Dept: PAIN MANAGEMENT | Age: 74
End: 2024-04-23

## 2024-04-23 NOTE — TELEPHONE ENCOUNTER
REFERRAL# [id number]    LEFT KNEE GEL (DUROLANE) INJ    AUTH APPROVED 5/7/24-8/7/24    OK to schedule procedure approved as above.   Please note sides/levels approved and date range.   (If applicable, sides/levels approved may differ from those ordered)    TO BE SCHEDULED WITH DR. CRAIG

## 2024-05-09 ENCOUNTER — PROCEDURE VISIT (OUTPATIENT)
Dept: PAIN MANAGEMENT | Age: 74
End: 2024-05-09
Payer: COMMERCIAL

## 2024-05-09 DIAGNOSIS — M17.12 ARTHRITIS OF LEFT KNEE: ICD-10-CM

## 2024-05-09 PROCEDURE — 20610 DRAIN/INJ JOINT/BURSA W/O US: CPT | Performed by: PAIN MEDICINE

## 2024-05-09 PROCEDURE — 77002 NEEDLE LOCALIZATION BY XRAY: CPT | Performed by: PAIN MEDICINE

## 2024-05-09 NOTE — PROGRESS NOTES
"2,10E+07", [facility].  Spine Surgery  Advanced Pain Management           Provider: GUILLE CRAIG DO          Patient Name: Lenora Dubose : 1950        Date: 2024         PROCEDURE: Left knee injection under fluoroscopic guidance    Description of Procedure:  The area was cleaned with Betadine and alcohol. Sterile technique  was used. Landmarks were identified. Superolateral approach was used.  After negative aspiration,  Durolane was injected without difficulty.  The  patient  tolerated the procedure well. No obvious complications occurred during the procedure.                 GUILLE CRAIG DO                           [address] [place] [address], [address] [place] [address]  Phone [phone]/Fax [phone]/[email or web address]

## 2024-06-09 DIAGNOSIS — I10 PRIMARY HYPERTENSION: ICD-10-CM

## 2024-06-10 ENCOUNTER — OFFICE VISIT (OUTPATIENT)
Dept: PAIN MANAGEMENT | Age: 74
End: 2024-06-10
Payer: COMMERCIAL

## 2024-06-10 VITALS
HEIGHT: 62 IN | SYSTOLIC BLOOD PRESSURE: 126 MMHG | BODY MASS INDEX: 34.78 KG/M2 | TEMPERATURE: 97 F | DIASTOLIC BLOOD PRESSURE: 66 MMHG | WEIGHT: 189 LBS

## 2024-06-10 DIAGNOSIS — G89.4 CHRONIC PAIN SYNDROME: Primary | ICD-10-CM

## 2024-06-10 DIAGNOSIS — M17.12 ARTHRITIS OF LEFT KNEE: ICD-10-CM

## 2024-06-10 PROCEDURE — 99213 OFFICE O/P EST LOW 20 MIN: CPT | Performed by: NURSE PRACTITIONER

## 2024-06-10 PROCEDURE — 1123F ACP DISCUSS/DSCN MKR DOCD: CPT | Performed by: NURSE PRACTITIONER

## 2024-06-10 RX ORDER — AMLODIPINE BESYLATE AND BENAZEPRIL HYDROCHLORIDE 5; 20 MG/1; MG/1
1 CAPSULE ORAL DAILY
Qty: 90 CAPSULE | Refills: 0 | Status: SHIPPED | OUTPATIENT
Start: 2024-06-10

## 2024-06-10 ASSESSMENT — ENCOUNTER SYMPTOMS
SHORTNESS OF BREATH: 0
CONSTIPATION: 0
BACK PAIN: 1
GASTROINTESTINAL NEGATIVE: 1
COUGH: 0
TROUBLE SWALLOWING: 0
DIARRHEA: 0
EYES NEGATIVE: 1

## 2024-06-10 NOTE — PROGRESS NOTES
Lenora Dubose  (1950)    6/10/2024    Subjective:     Lenora Dubose is 73 y.o. female who complains today of:    Chief Complaint   Patient presents with    Follow-up    Knee Pain         Allergies:  Lidocaine, Novocain [procaine], and Shrimp (diagnostic)    Past Medical History:   Diagnosis Date    Acid reflux     Chronic back pain     COVID-19 06/2021    Hyperlipidemia     Hypertension     Osteoarthritis     shots in knees by ortho     Past Surgical History:   Procedure Laterality Date    APPENDECTOMY  1959    BREAST BIOPSY      BREAST SURGERY      COLONOSCOPY N/A 8/10/2023    COLORECTAL CANCER SCREENING, NOT HIGH RISK performed by Nel Schneider MD at [facility]    HYSTERECTOMY (CERVIX STATUS UNKNOWN)  1992    OVARY REMOVAL      [place] COLON CA SCRN NOT HI RSK IND N/A 5/9/2018    COLONOSCOPY performed by Nel Schneider MD at [facility]     Family History   Problem Relation Age of Onset    Colon Cancer Neg Hx      Social History     Socioeconomic History    Marital status:      Spouse name: Not on file    Number of children: Not on file    Years of education: Not on file    Highest education level: Not on file   Occupational History    Not on file   Tobacco Use    Smoking status: Never    Smokeless tobacco: Never   Vaping Use    Vaping Use: Never used   Substance and Sexual Activity    Alcohol use: Not Currently     Comment: rarely    Drug use: No    Sexual activity: Not Currently   Other Topics Concern    Not on file   Social History Narrative    Not on file     Social Determinants of Health     Financial Resource Strain: Low Risk  (4/5/2023)    Overall Financial Resource Strain (CARDIA)     Difficulty of Paying Living Expenses: Not hard at all   Food Insecurity: Not on file (4/5/2023)   Transportation Needs: Unknown (4/5/2023)    PRAPARE - Transportation     Lack of Transportation (Medical): Not on file     Lack of Transportation (Non-Medical): No   Physical

## 2024-09-10 DIAGNOSIS — I10 PRIMARY HYPERTENSION: ICD-10-CM

## 2024-09-12 DIAGNOSIS — I10 PRIMARY HYPERTENSION: ICD-10-CM

## 2024-09-12 RX ORDER — AMLODIPINE AND BENAZEPRIL HYDROCHLORIDE 5; 20 MG/1; MG/1
1 CAPSULE ORAL DAILY
Qty: 7 CAPSULE | Refills: 0 | Status: SHIPPED | OUTPATIENT
Start: 2024-09-12 | End: 2024-09-19

## 2024-09-12 RX ORDER — AMLODIPINE AND BENAZEPRIL HYDROCHLORIDE 5; 20 MG/1; MG/1
CAPSULE ORAL DAILY
Qty: 90 CAPSULE | Refills: 0 | OUTPATIENT
Start: 2024-09-12

## 2024-09-16 ENCOUNTER — OFFICE VISIT (OUTPATIENT)
Dept: PRIMARY CARE CLINIC | Age: 74
End: 2024-09-16
Payer: COMMERCIAL

## 2024-09-16 VITALS
HEIGHT: 62 IN | BODY MASS INDEX: 33.86 KG/M2 | SYSTOLIC BLOOD PRESSURE: 134 MMHG | WEIGHT: 184 LBS | HEART RATE: 71 BPM | OXYGEN SATURATION: 97 % | DIASTOLIC BLOOD PRESSURE: 60 MMHG

## 2024-09-16 DIAGNOSIS — R73.03 PREDIABETES: ICD-10-CM

## 2024-09-16 DIAGNOSIS — E03.9 HYPOTHYROIDISM, UNSPECIFIED TYPE: ICD-10-CM

## 2024-09-16 DIAGNOSIS — I10 PRIMARY HYPERTENSION: ICD-10-CM

## 2024-09-16 DIAGNOSIS — E66.9 OBESITY (BMI 30-39.9): ICD-10-CM

## 2024-09-16 DIAGNOSIS — L21.9 SEBORRHEIC DERMATITIS OF SCALP: ICD-10-CM

## 2024-09-16 DIAGNOSIS — G47.00 INSOMNIA, UNSPECIFIED TYPE: ICD-10-CM

## 2024-09-16 DIAGNOSIS — R73.9 HYPERGLYCEMIA: ICD-10-CM

## 2024-09-16 DIAGNOSIS — E78.00 PURE HYPERCHOLESTEROLEMIA: ICD-10-CM

## 2024-09-16 DIAGNOSIS — I10 PRIMARY HYPERTENSION: Primary | ICD-10-CM

## 2024-09-16 DIAGNOSIS — M85.80 OSTEOPENIA, UNSPECIFIED LOCATION: ICD-10-CM

## 2024-09-16 DIAGNOSIS — Z00.00 PREVENTATIVE HEALTH CARE: ICD-10-CM

## 2024-09-16 LAB
ALBUMIN SERPL-MCNC: 4.5 G/DL (ref 3.5–4.6)
ALP SERPL-CCNC: 78 U/L (ref 40–130)
ALT SERPL-CCNC: 22 U/L (ref 0–33)
ANION GAP SERPL CALCULATED.3IONS-SCNC: 12 MEQ/L (ref 9–15)
AST SERPL-CCNC: 20 U/L (ref 0–35)
BASOPHILS # BLD: 0 K/UL (ref 0–0.2)
BASOPHILS NFR BLD: 0.5 %
BILIRUB SERPL-MCNC: 0.3 MG/DL (ref 0.2–0.7)
BUN SERPL-MCNC: 24 MG/DL (ref 8–23)
CALCIUM SERPL-MCNC: 9.5 MG/DL (ref 8.5–9.9)
CHLORIDE SERPL-SCNC: 103 MEQ/L (ref 95–107)
CHOLEST SERPL-MCNC: 189 MG/DL (ref 0–199)
CO2 SERPL-SCNC: 27 MEQ/L (ref 20–31)
CREAT SERPL-MCNC: 0.56 MG/DL (ref 0.5–0.9)
EOSINOPHIL # BLD: 0.1 K/UL (ref 0–0.7)
EOSINOPHIL NFR BLD: 1.6 %
ERYTHROCYTE [DISTWIDTH] IN BLOOD BY AUTOMATED COUNT: 13 % (ref 11.5–14.5)
GLOBULIN SER CALC-MCNC: 2.5 G/DL (ref 2.3–3.5)
GLUCOSE SERPL-MCNC: 100 MG/DL (ref 70–99)
HCT VFR BLD AUTO: 42.6 % (ref 37–47)
HDLC SERPL-MCNC: 42 MG/DL (ref 40–59)
HGB BLD-MCNC: 14.1 G/DL (ref 12–16)
LDLC SERPL CALC-MCNC: 101 MG/DL (ref 0–129)
LYMPHOCYTES # BLD: 2.4 K/UL (ref 1–4.8)
LYMPHOCYTES NFR BLD: 37.3 %
MCH RBC QN AUTO: 28.4 PG (ref 27–31.3)
MCHC RBC AUTO-ENTMCNC: 33.1 % (ref 33–37)
MCV RBC AUTO: 85.7 FL (ref 79.4–94.8)
MONOCYTES # BLD: 0.6 K/UL (ref 0.2–0.8)
MONOCYTES NFR BLD: 8.8 %
NEUTROPHILS # BLD: 3.3 K/UL (ref 1.4–6.5)
NEUTS SEG NFR BLD: 51.6 %
PLATELET # BLD AUTO: 361 K/UL (ref 130–400)
POTASSIUM SERPL-SCNC: 4.3 MEQ/L (ref 3.4–4.9)
PROT SERPL-MCNC: 7 G/DL (ref 6.3–8)
RBC # BLD AUTO: 4.97 M/UL (ref 4.2–5.4)
SODIUM SERPL-SCNC: 142 MEQ/L (ref 135–144)
TRIGL SERPL-MCNC: 232 MG/DL (ref 0–150)
TSH REFLEX: 1.68 UIU/ML (ref 0.44–3.86)
WBC # BLD AUTO: 6.4 K/UL (ref 4.8–10.8)

## 2024-09-16 PROCEDURE — 1123F ACP DISCUSS/DSCN MKR DOCD: CPT | Performed by: STUDENT IN AN ORGANIZED HEALTH CARE EDUCATION/TRAINING PROGRAM

## 2024-09-16 PROCEDURE — 3075F SYST BP GE 130 - 139MM HG: CPT | Performed by: STUDENT IN AN ORGANIZED HEALTH CARE EDUCATION/TRAINING PROGRAM

## 2024-09-16 PROCEDURE — 99214 OFFICE O/P EST MOD 30 MIN: CPT | Performed by: STUDENT IN AN ORGANIZED HEALTH CARE EDUCATION/TRAINING PROGRAM

## 2024-09-16 PROCEDURE — 3078F DIAST BP <80 MM HG: CPT | Performed by: STUDENT IN AN ORGANIZED HEALTH CARE EDUCATION/TRAINING PROGRAM

## 2024-09-16 RX ORDER — KETOCONAZOLE 20 MG/ML
SHAMPOO TOPICAL
Qty: 120 ML | Refills: 5 | Status: CANCELLED | OUTPATIENT
Start: 2024-09-16 | End: 2024-09-23

## 2024-09-16 RX ORDER — PHENOL 1.4 %
1 AEROSOL, SPRAY (ML) MUCOUS MEMBRANE NIGHTLY PRN
Qty: 30 TABLET | Refills: 5 | Status: SHIPPED | OUTPATIENT
Start: 2024-09-16 | End: 2025-03-15

## 2024-09-16 RX ORDER — SIMVASTATIN 10 MG
10 TABLET ORAL NIGHTLY
Qty: 90 TABLET | Refills: 1 | Status: SHIPPED | OUTPATIENT
Start: 2024-09-16 | End: 2025-03-15

## 2024-09-16 RX ORDER — CETIRIZINE HYDROCHLORIDE 5 MG/1
5 TABLET ORAL DAILY
COMMUNITY

## 2024-09-16 RX ORDER — AMLODIPINE AND BENAZEPRIL HYDROCHLORIDE 5; 20 MG/1; MG/1
1 CAPSULE ORAL DAILY
Qty: 90 CAPSULE | Refills: 1 | Status: SHIPPED | OUTPATIENT
Start: 2024-09-16 | End: 2025-03-15

## 2024-09-16 SDOH — ECONOMIC STABILITY: FOOD INSECURITY: WITHIN THE PAST 12 MONTHS, THE FOOD YOU BOUGHT JUST DIDN'T LAST AND YOU DIDN'T HAVE MONEY TO GET MORE.: NEVER TRUE

## 2024-09-16 SDOH — ECONOMIC STABILITY: INCOME INSECURITY: HOW HARD IS IT FOR YOU TO PAY FOR THE VERY BASICS LIKE FOOD, HOUSING, MEDICAL CARE, AND HEATING?: NOT HARD AT ALL

## 2024-09-16 SDOH — ECONOMIC STABILITY: FOOD INSECURITY: WITHIN THE PAST 12 MONTHS, YOU WORRIED THAT YOUR FOOD WOULD RUN OUT BEFORE YOU GOT MONEY TO BUY MORE.: NEVER TRUE

## 2024-09-17 LAB
ESTIMATED AVERAGE GLUCOSE: 126 MG/DL
HBA1C MFR BLD: 6 % (ref 4–6)

## 2024-10-29 ENCOUNTER — OFFICE VISIT (OUTPATIENT)
Age: 74
End: 2024-10-29
Payer: COMMERCIAL

## 2024-10-29 VITALS
WEIGHT: 184 LBS | DIASTOLIC BLOOD PRESSURE: 66 MMHG | TEMPERATURE: 97.4 F | SYSTOLIC BLOOD PRESSURE: 116 MMHG | BODY MASS INDEX: 33.86 KG/M2 | HEIGHT: 62 IN

## 2024-10-29 DIAGNOSIS — M17.12 ARTHRITIS OF LEFT KNEE: Primary | ICD-10-CM

## 2024-10-29 DIAGNOSIS — G89.4 CHRONIC PAIN SYNDROME: ICD-10-CM

## 2024-10-29 PROCEDURE — 3078F DIAST BP <80 MM HG: CPT | Performed by: NURSE PRACTITIONER

## 2024-10-29 PROCEDURE — 1125F AMNT PAIN NOTED PAIN PRSNT: CPT | Performed by: NURSE PRACTITIONER

## 2024-10-29 PROCEDURE — 99215 OFFICE O/P EST HI 40 MIN: CPT

## 2024-10-29 PROCEDURE — 1123F ACP DISCUSS/DSCN MKR DOCD: CPT | Performed by: NURSE PRACTITIONER

## 2024-10-29 PROCEDURE — 3074F SYST BP LT 130 MM HG: CPT | Performed by: NURSE PRACTITIONER

## 2024-10-29 PROCEDURE — 99213 OFFICE O/P EST LOW 20 MIN: CPT | Performed by: NURSE PRACTITIONER

## 2024-10-29 PROCEDURE — 1159F MED LIST DOCD IN RCRD: CPT | Performed by: NURSE PRACTITIONER

## 2024-10-29 RX ORDER — FLUOCINONIDE TOPICAL SOLUTION USP, 0.05% 0.5 MG/ML
SOLUTION TOPICAL
COMMUNITY
Start: 2024-10-02

## 2024-10-29 RX ORDER — KETOCONAZOLE 20 MG/ML
SHAMPOO TOPICAL
COMMUNITY
Start: 2024-10-02

## 2024-10-29 ASSESSMENT — ENCOUNTER SYMPTOMS
TROUBLE SWALLOWING: 0
DIARRHEA: 0
CONSTIPATION: 0
NAUSEA: 0
COUGH: 0
SHORTNESS OF BREATH: 0
EYES NEGATIVE: 1
BACK PAIN: 0
GASTROINTESTINAL NEGATIVE: 1

## 2024-10-29 NOTE — PROGRESS NOTES
Lenora Dubose  (1950)    10/29/2024    Subjective:     Lenora Dubose is 73 y.o. female who complains today of:    Chief Complaint   Patient presents with    Follow-up    Knee Pain     Left          Allergies:  Lidocaine, Novocain [procaine], and Shrimp (diagnostic)    Past Medical History:   Diagnosis Date    Acid reflux     Chronic back pain     COVID-19 06/2021    Hyperlipidemia     Hypertension     Osteoarthritis     shots in knees by ortho     Past Surgical History:   Procedure Laterality Date    APPENDECTOMY  1959    BREAST BIOPSY      BREAST SURGERY      COLONOSCOPY N/A 8/10/2023    COLORECTAL CANCER SCREENING, NOT HIGH RISK performed by Nel Schneider MD at [facility]    HYSTERECTOMY (CERVIX STATUS UNKNOWN)  1992    OVARY REMOVAL      [place] COLON CA SCRN NOT HI RSK IND N/A 5/9/2018    COLONOSCOPY performed by Nel Schneider MD at [facility]     Family History   Problem Relation Age of Onset    Colon Cancer Neg Hx      Social History     Socioeconomic History    Marital status:      Spouse name: Not on file    Number of children: Not on file    Years of education: Not on file    Highest education level: Not on file   Occupational History    Not on file   Tobacco Use    Smoking status: Never    Smokeless tobacco: Never   Vaping Use    Vaping status: Never Used   Substance and Sexual Activity    Alcohol use: Not Currently     Comment: rarely    Drug use: No    Sexual activity: Not Currently   Other Topics Concern    Not on file   Social History Narrative    Not on file     Social Determinants of Health     Financial Resource Strain: Low Risk  (9/16/2024)    Overall Financial Resource Strain (CARDIA)     Difficulty of Paying Living Expenses: Not hard at all   Food Insecurity: No Food Insecurity (9/16/2024)    Hunger Vital Sign     Worried About Running Out of Food in the Last Year: Never true     Ran Out of Food in the Last Year: Never true   Transportation Needs:

## 2024-10-30 ENCOUNTER — TELEPHONE (OUTPATIENT)
Age: 74
End: 2024-10-30

## 2024-10-30 NOTE — TELEPHONE ENCOUNTER
LEFT DUROLANE INJECTION AFTER 11/9/24    AUTH APPROVED FROM 11/11/24-12/31/24  PATIENT WOULD LIKE TO BE SCHEDULED BEFORE 11/14/24    OK to schedule procedure approved as above.   Please note sides/levels approved and date range.   (If applicable, sides/levels approved may differ from those ordered)    TO BE SCHEDULED WITH

## 2024-10-30 NOTE — TELEPHONE ENCOUNTER
LMOM for patient to return call to schedule procedure with Dr Benjamin after 11/11/24.  LEFT DUROLANE INJECTION AFTER 11/9/24     AUTH APPROVED FROM 11/11/24-12/31/24

## 2024-10-31 NOTE — TELEPHONE ENCOUNTER
LMOM for a return call to schedule procedure with Dr Benjamin.   LEFT DUROLANE INJECTION AFTER 11/9/24     AUTH APPROVED FROM 11/11/24-12/31/24

## 2024-11-12 ENCOUNTER — PROCEDURE VISIT (OUTPATIENT)
Age: 74
End: 2024-11-12
Payer: COMMERCIAL

## 2024-11-12 VITALS
SYSTOLIC BLOOD PRESSURE: 128 MMHG | OXYGEN SATURATION: 96 % | BODY MASS INDEX: 34.04 KG/M2 | TEMPERATURE: 97.7 F | HEART RATE: 74 BPM | WEIGHT: 185 LBS | HEIGHT: 62 IN | DIASTOLIC BLOOD PRESSURE: 60 MMHG

## 2024-11-12 DIAGNOSIS — M17.12 ARTHRITIS OF LEFT KNEE: Primary | ICD-10-CM

## 2024-11-12 PROCEDURE — 77002 NEEDLE LOCALIZATION BY XRAY: CPT | Performed by: PAIN MEDICINE

## 2024-11-12 RX ADMIN — Medication 60 MG: at 16:28

## 2024-11-12 ASSESSMENT — PAIN DESCRIPTION - LOCATION: LOCATION: KNEE

## 2024-11-12 ASSESSMENT — PAIN SCALES - GENERAL: PAINLEVEL_OUTOF10: 4

## 2024-11-12 NOTE — PROGRESS NOTES
[facility].  Spine Surgery  Advanced Pain Management           Provider: GUILLE CRAIG DO          Patient Name: Lenora Dubose : 1950        Date: 2024         PROCEDURE: Left knee injection under fluoroscopic guidance    Description of Procedure:  The area was cleaned with Betadine and alcohol. Sterile technique  was used. Landmarks were identified. Superolateral approach was used.  After negative aspiration, Durolane was injected without difficulty.  The  patient  tolerated the procedure well. No obvious complications occurred during the procedure.                 GUILLE CRAIG DO                           [address] [place] [address], [address] [place] [address]  Phone [phone]/Fax [phone]/[email or web address]

## 2025-02-28 DIAGNOSIS — E78.00 PURE HYPERCHOLESTEROLEMIA: ICD-10-CM

## 2025-03-02 DIAGNOSIS — I10 PRIMARY HYPERTENSION: ICD-10-CM

## 2025-03-03 RX ORDER — AMLODIPINE AND BENAZEPRIL HYDROCHLORIDE 5; 20 MG/1; MG/1
CAPSULE ORAL DAILY
Qty: 90 CAPSULE | Refills: 1 | OUTPATIENT
Start: 2025-03-03

## 2025-03-03 RX ORDER — SIMVASTATIN 10 MG
10 TABLET ORAL EVERY EVENING
Qty: 90 TABLET | Refills: 1 | OUTPATIENT
Start: 2025-03-03

## 2025-03-17 ENCOUNTER — OFFICE VISIT (OUTPATIENT)
Dept: PRIMARY CARE CLINIC | Age: 75
End: 2025-03-17
Payer: COMMERCIAL

## 2025-03-17 VITALS
WEIGHT: 180 LBS | HEIGHT: 62 IN | OXYGEN SATURATION: 98 % | BODY MASS INDEX: 33.13 KG/M2 | HEART RATE: 69 BPM | DIASTOLIC BLOOD PRESSURE: 72 MMHG | SYSTOLIC BLOOD PRESSURE: 124 MMHG

## 2025-03-17 DIAGNOSIS — R73.03 PREDIABETES: ICD-10-CM

## 2025-03-17 DIAGNOSIS — E66.9 OBESITY (BMI 30-39.9): ICD-10-CM

## 2025-03-17 DIAGNOSIS — G47.00 INSOMNIA, UNSPECIFIED TYPE: ICD-10-CM

## 2025-03-17 DIAGNOSIS — E78.00 PURE HYPERCHOLESTEROLEMIA: ICD-10-CM

## 2025-03-17 DIAGNOSIS — Z00.00 MEDICARE ANNUAL WELLNESS VISIT, SUBSEQUENT: Primary | ICD-10-CM

## 2025-03-17 DIAGNOSIS — F33.0 MILD EPISODE OF RECURRENT MAJOR DEPRESSIVE DISORDER: ICD-10-CM

## 2025-03-17 DIAGNOSIS — Z12.31 ENCOUNTER FOR SCREENING MAMMOGRAM FOR MALIGNANT NEOPLASM OF BREAST: ICD-10-CM

## 2025-03-17 DIAGNOSIS — G47.30 SLEEP APNEA, UNSPECIFIED TYPE: ICD-10-CM

## 2025-03-17 DIAGNOSIS — M85.80 OSTEOPENIA, UNSPECIFIED LOCATION: ICD-10-CM

## 2025-03-17 DIAGNOSIS — E55.9 VITAMIN D DEFICIENCY: ICD-10-CM

## 2025-03-17 DIAGNOSIS — I10 PRIMARY HYPERTENSION: ICD-10-CM

## 2025-03-17 DIAGNOSIS — L21.9 SEBORRHEIC DERMATITIS OF SCALP: ICD-10-CM

## 2025-03-17 PROBLEM — F33.1 MAJOR DEPRESSIVE DISORDER, RECURRENT, MODERATE (HCC): Status: RESOLVED | Noted: 2023-10-04 | Resolved: 2025-03-17

## 2025-03-17 PROBLEM — E66.01 SEVERE OBESITY (BMI 35.0-39.9) WITH COMORBIDITY: Status: RESOLVED | Noted: 2023-04-05 | Resolved: 2025-03-17

## 2025-03-17 LAB
BASOPHILS # BLD: 0 K/UL (ref 0–0.2)
BASOPHILS NFR BLD: 0.6 %
EOSINOPHIL # BLD: 0.2 K/UL (ref 0–0.7)
EOSINOPHIL NFR BLD: 3.3 %
ERYTHROCYTE [DISTWIDTH] IN BLOOD BY AUTOMATED COUNT: 13.2 % (ref 11.5–14.5)
HCT VFR BLD AUTO: 42.5 % (ref 37–47)
HGB BLD-MCNC: 14.2 G/DL (ref 12–16)
LYMPHOCYTES # BLD: 2.1 K/UL (ref 1–4.8)
LYMPHOCYTES NFR BLD: 29.9 %
MCH RBC QN AUTO: 28.9 PG (ref 27–31.3)
MCHC RBC AUTO-ENTMCNC: 33.4 % (ref 33–37)
MCV RBC AUTO: 86.4 FL (ref 79.4–94.8)
MONOCYTES # BLD: 0.7 K/UL (ref 0.2–0.8)
MONOCYTES NFR BLD: 10.1 %
NEUTROPHILS # BLD: 3.9 K/UL (ref 1.4–6.5)
NEUTS SEG NFR BLD: 56 %
PLATELET # BLD AUTO: 380 K/UL (ref 130–400)
RBC # BLD AUTO: 4.92 M/UL (ref 4.2–5.4)
WBC # BLD AUTO: 6.9 K/UL (ref 4.8–10.8)

## 2025-03-17 PROCEDURE — 3078F DIAST BP <80 MM HG: CPT | Performed by: STUDENT IN AN ORGANIZED HEALTH CARE EDUCATION/TRAINING PROGRAM

## 2025-03-17 PROCEDURE — 3074F SYST BP LT 130 MM HG: CPT | Performed by: STUDENT IN AN ORGANIZED HEALTH CARE EDUCATION/TRAINING PROGRAM

## 2025-03-17 PROCEDURE — 1123F ACP DISCUSS/DSCN MKR DOCD: CPT | Performed by: STUDENT IN AN ORGANIZED HEALTH CARE EDUCATION/TRAINING PROGRAM

## 2025-03-17 PROCEDURE — 99214 OFFICE O/P EST MOD 30 MIN: CPT | Performed by: STUDENT IN AN ORGANIZED HEALTH CARE EDUCATION/TRAINING PROGRAM

## 2025-03-17 PROCEDURE — G0439 PPPS, SUBSEQ VISIT: HCPCS | Performed by: STUDENT IN AN ORGANIZED HEALTH CARE EDUCATION/TRAINING PROGRAM

## 2025-03-17 PROCEDURE — 1159F MED LIST DOCD IN RCRD: CPT | Performed by: STUDENT IN AN ORGANIZED HEALTH CARE EDUCATION/TRAINING PROGRAM

## 2025-03-17 RX ORDER — SAME BUTANEDISULFONATE/BETAINE 400-600 MG
POWDER IN PACKET (EA) ORAL
Qty: 90 CAPSULE | Refills: 1 | Status: SHIPPED | OUTPATIENT
Start: 2025-03-17

## 2025-03-17 RX ORDER — AMLODIPINE AND BENAZEPRIL HYDROCHLORIDE 5; 20 MG/1; MG/1
1 CAPSULE ORAL DAILY
Qty: 90 CAPSULE | Refills: 1 | Status: SHIPPED | OUTPATIENT
Start: 2025-03-17 | End: 2025-09-13

## 2025-03-17 RX ORDER — SIMVASTATIN 10 MG
10 TABLET ORAL NIGHTLY
Qty: 90 TABLET | Refills: 1 | Status: SHIPPED | OUTPATIENT
Start: 2025-03-17 | End: 2025-09-13

## 2025-03-17 SDOH — ECONOMIC STABILITY: FOOD INSECURITY: WITHIN THE PAST 12 MONTHS, YOU WORRIED THAT YOUR FOOD WOULD RUN OUT BEFORE YOU GOT MONEY TO BUY MORE.: NEVER TRUE

## 2025-03-17 SDOH — ECONOMIC STABILITY: FOOD INSECURITY: WITHIN THE PAST 12 MONTHS, THE FOOD YOU BOUGHT JUST DIDN'T LAST AND YOU DIDN'T HAVE MONEY TO GET MORE.: NEVER TRUE

## 2025-03-17 ASSESSMENT — PATIENT HEALTH QUESTIONNAIRE - PHQ9
SUM OF ALL RESPONSES TO PHQ QUESTIONS 1-9: 0
3. TROUBLE FALLING OR STAYING ASLEEP: NOT AT ALL
SUM OF ALL RESPONSES TO PHQ QUESTIONS 1-9: 0
6. FEELING BAD ABOUT YOURSELF - OR THAT YOU ARE A FAILURE OR HAVE LET YOURSELF OR YOUR FAMILY DOWN: NOT AT ALL
SUM OF ALL RESPONSES TO PHQ QUESTIONS 1-9: 0
2. FEELING DOWN, DEPRESSED OR HOPELESS: NOT AT ALL
9. THOUGHTS THAT YOU WOULD BE BETTER OFF DEAD, OR OF HURTING YOURSELF: NOT AT ALL
2. FEELING DOWN, DEPRESSED OR HOPELESS: NOT AT ALL
8. MOVING OR SPEAKING SO SLOWLY THAT OTHER PEOPLE COULD HAVE NOTICED. OR THE OPPOSITE, BEING SO FIGETY OR RESTLESS THAT YOU HAVE BEEN MOVING AROUND A LOT MORE THAN USUAL: NOT AT ALL
1. LITTLE INTEREST OR PLEASURE IN DOING THINGS: NOT AT ALL
SUM OF ALL RESPONSES TO PHQ QUESTIONS 1-9: 0
1. LITTLE INTEREST OR PLEASURE IN DOING THINGS: NOT AT ALL
5. POOR APPETITE OR OVEREATING: NOT AT ALL
4. FEELING TIRED OR HAVING LITTLE ENERGY: NOT AT ALL
7. TROUBLE CONCENTRATING ON THINGS, SUCH AS READING THE NEWSPAPER OR WATCHING TELEVISION: NOT AT ALL
SUM OF ALL RESPONSES TO PHQ QUESTIONS 1-9: 0

## 2025-03-17 NOTE — PROGRESS NOTES
tablet Take 1 tablet by mouth daily Yes Provider, MD Mauro   vitamin D (CHOLECALCIFEROL) 50 MCG (2000 UT) TABS tablet Take 1 tablet by mouth daily Yes Remy Sosa MD   ibuprofen (ADVIL;MOTRIN) 800 MG tablet Take 1 tablet by mouth 2 times daily as needed for Pain Yes Mason Wright MD   Ascorbic Acid (VITAMIN C GUMMIE) 120 MG CHEW Take by mouth Yes ProviderMauro MD   metFORMIN (GLUCOPHAGE) 500 MG tablet Take 1 tablet by mouth 2 times daily (with meals)  Remy Sosa MD   Melatonin 10 MG TABS Take 1 tablet by mouth nightly as needed (insomnia)  Remy Sosa MD       [facility] (Including outside providers/suppliers regularly involved in providing care):   Patient Care Team:  Remy Sosa MD as PCP - General (Family Medicine)  Remy Sosa MD as PCP - Empaneled Provider     Recommendations for Preventive Services Due: see orders and patient instructions/AVS.  Recommended screening schedule for the next 5-10 years is provided to the patient in written form: see Patient Instructions/AVS.     Reviewed and updated this visit:  Allergies  Meds  Sexual Hx

## 2025-03-17 NOTE — PATIENT INSTRUCTIONS
hours of sleep each night.     Limit alcohol to 2 drinks a day for men and 1 drink a day for women. Too much alcohol can cause health problems.     Manage other health problems such as diabetes, high blood pressure, and high cholesterol. If you think you may have a problem with alcohol or drug use, talk to your doctor.   Medicines    Take your medicines exactly as prescribed. Call your doctor if you think you are having a problem with your medicine.     If your doctor recommends aspirin, take the amount directed each day. Make sure you take aspirin and not another kind of pain reliever, such as acetaminophen (Tylenol).   When should you call for help?   Call 911 if you have symptoms of a heart attack. These may include:    Chest pain or pressure, or a strange feeling in the chest.     Sweating.     Shortness of breath.     Pain, pressure, or a strange feeling in the back, neck, jaw, or upper belly or in one or both shoulders or arms.     Lightheadedness or sudden weakness.     A fast or irregular heartbeat.   After you call 911, the  may tell you to chew 1 adult-strength or 2 to 4 low-dose aspirin. Wait for an ambulance. Do not try to drive yourself.  Watch closely for changes in your health, and be sure to contact your doctor if you have any problems.  Where can you learn more?  Go to https://www.[employer].net/patientEd and enter F075 to learn more about \"A Healthy Heart: Care Instructions.\"  Current as of: July 31, 2024  Content Version: 14.4  © 2024-2025 [employer].   Care instructions adapted under license by "[employer]. If you have questions about a medical condition or this instruction, always ask your healthcare professional. [facility] [employer], disclaims any warranty or liability for your use of this information.    Personalized Preventive Plan for Lenora Dubose - 3/17/2025  Medicare offers a range of preventive health benefits. Some of the tests and screenings are paid in

## 2025-03-18 ENCOUNTER — RESULTS FOLLOW-UP (OUTPATIENT)
Dept: PRIMARY CARE CLINIC | Age: 75
End: 2025-03-18

## 2025-03-18 LAB
ESTIMATED AVERAGE GLUCOSE: 120 MG/DL
HBA1C MFR BLD: 5.8 % (ref 4–6)

## 2025-03-27 DIAGNOSIS — R73.03 PREDIABETES: ICD-10-CM

## 2025-04-24 ENCOUNTER — HOSPITAL ENCOUNTER (OUTPATIENT)
Dept: WOMENS IMAGING | Age: 75
Discharge: HOME | End: 2025-04-26
Payer: COMMERCIAL

## 2025-04-24 DIAGNOSIS — Z12.31 ENCOUNTER FOR SCREENING MAMMOGRAM FOR MALIGNANT NEOPLASM OF BREAST: ICD-10-CM

## 2025-04-24 PROCEDURE — 77063 BREAST TOMOSYNTHESIS BI: CPT

## 2025-09-03 DIAGNOSIS — I10 PRIMARY HYPERTENSION: ICD-10-CM

## 2025-09-04 RX ORDER — AMLODIPINE AND BENAZEPRIL HYDROCHLORIDE 5; 20 MG/1; MG/1
CAPSULE ORAL DAILY
Qty: 90 CAPSULE | Refills: 1 | Status: SHIPPED | OUTPATIENT
Start: 2025-09-04

## (undated) DEVICE — TUBING, SUCTION, 1/4" X 10', STRAIGHT: Brand: MEDLINE

## (undated) DEVICE — SINGLE PORT MANIFOLD: Brand: NEPTUNE 2

## (undated) DEVICE — TUBE SET 96 MM 64 MM H2O PERISTALTIC STD AUX CHANNEL

## (undated) DEVICE — Device: Brand: ENDO SMARTCAP

## (undated) DEVICE — BRUSH ENDO CLN L90.5IN SHTH DIA1.7MM BRIST DIA5-7MM 2-6MM

## (undated) DEVICE — ENDO CARRY-ON PROCEDURE KIT: Brand: ENDO CARRY-ON PROCEDURE KIT